# Patient Record
Sex: FEMALE | Race: BLACK OR AFRICAN AMERICAN | NOT HISPANIC OR LATINO | Employment: FULL TIME | URBAN - METROPOLITAN AREA
[De-identification: names, ages, dates, MRNs, and addresses within clinical notes are randomized per-mention and may not be internally consistent; named-entity substitution may affect disease eponyms.]

---

## 2017-03-27 ENCOUNTER — OFFICE VISIT (OUTPATIENT)
Dept: FAMILY MEDICINE | Facility: CLINIC | Age: 32
End: 2017-03-27
Payer: COMMERCIAL

## 2017-03-27 VITALS
SYSTOLIC BLOOD PRESSURE: 120 MMHG | HEART RATE: 83 BPM | DIASTOLIC BLOOD PRESSURE: 64 MMHG | OXYGEN SATURATION: 100 % | WEIGHT: 211.88 LBS | BODY MASS INDEX: 30.4 KG/M2 | RESPIRATION RATE: 16 BRPM

## 2017-03-27 DIAGNOSIS — F41.9 ANXIETY: ICD-10-CM

## 2017-03-27 DIAGNOSIS — E66.9 OBESITY (BMI 30-39.9): ICD-10-CM

## 2017-03-27 DIAGNOSIS — F51.01 PRIMARY INSOMNIA: Primary | ICD-10-CM

## 2017-03-27 DIAGNOSIS — Z83.3 FAMILY HISTORY OF DIABETES MELLITUS IN MOTHER: ICD-10-CM

## 2017-03-27 PROCEDURE — 1160F RVW MEDS BY RX/DR IN RCRD: CPT | Mod: S$GLB,,, | Performed by: FAMILY MEDICINE

## 2017-03-27 PROCEDURE — 99214 OFFICE O/P EST MOD 30 MIN: CPT | Mod: S$GLB,,, | Performed by: FAMILY MEDICINE

## 2017-03-27 PROCEDURE — 99999 PR PBB SHADOW E&M-EST. PATIENT-LVL III: CPT | Mod: PBBFAC,,, | Performed by: FAMILY MEDICINE

## 2017-03-27 RX ORDER — ESCITALOPRAM OXALATE 10 MG/1
10 TABLET ORAL DAILY
Qty: 30 TABLET | Refills: 0 | Status: SHIPPED | OUTPATIENT
Start: 2017-03-27 | End: 2017-04-26 | Stop reason: SDUPTHER

## 2017-03-27 NOTE — PROGRESS NOTES
Subjective:       Patient ID: Geri Mosqueda is a 31 y.o. female.    Chief Complaint: Trouble Sleeping and Fatigue    Fatigue   Associated symptoms include fatigue. Pertinent negatives include no abdominal pain, chest pain, chills, fever, headaches, nausea, vomiting or weakness.    31 year old female here for trouble sleeping. Patient has been taking trazodone but does not like it because it makes her too groggy in the am.   Patient's issue is staying asleep. Patient has had these symptoms since childhood. Patient averages 6 hours a night. She does not take naps. Patient consumes alcohol only on special occasions. Patient states she has anxiety and symptoms of minor depression. No suicidal thoughts. 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 She thinks her anxiety may be contributing to her sleep problems. SHe has spoken to a licensed social worker in the past and plans on doing so again, which helped with therapy for anxiety. She has tried melatonin in the past with minimal relief.    Patient has copper IUD due to history of provoked post op DVT/PE. She was put on coumadin for 6-9 months and has not had complication since then.  Patient had paragard placed via  at [facility] in 2015. Patient has an appointment for pap smear in 1 week.     Review of Systems   Constitutional: Positive for fatigue. Negative for chills and fever.   Respiratory: Negative for chest tightness and shortness of breath.    Cardiovascular: Negative for chest pain and leg swelling.   Gastrointestinal: Negative for abdominal pain, diarrhea, nausea and vomiting.   Genitourinary: Negative for menstrual problem.   Neurological: Negative for weakness and headaches.   Psychiatric/Behavioral: Positive for sleep disturbance. Negative for self-injury and suicidal ideas. The patient is nervous/anxious.        Objective:      Vitals:    03/27/17 0937   BP: 120/64   Pulse: 83   Resp: 16     Physical Exam    Constitutional: She is oriented to person, place, and time. She appears well-developed and well-nourished. No distress.   HENT:   Mouth/Throat: Oropharynx is clear and moist. No oropharyngeal exudate.   Eyes: EOM are normal. Right eye exhibits no discharge. Left eye exhibits no discharge.   Neck: Normal range of motion.   Cardiovascular: Normal rate and regular rhythm.    Pulmonary/Chest: Effort normal. She has no wheezes.   Abdominal: Soft. There is no tenderness. There is no rebound and no guarding.   Musculoskeletal: She exhibits no edema or tenderness.   Lymphadenopathy:     She has no cervical adenopathy.   Neurological: She is alert and oriented to person, place, and time.   Skin: Rash noted.   Rash on neck, chest, arms. Areas of hypopigmentation, flat.    Psychiatric: She has a normal mood and affect. Her behavior is normal. Judgment and thought content normal.   Vitals reviewed.      Assessment:       1. Primary insomnia    2. Obesity (BMI 30-39.9)    3. Family history of diabetes mellitus in mother        Plan:         1. Insomnia: will get labs. Advised on sleep hygiene and information given on that. Will start lexapro to control anxiety along with appointments for behavioral therapy. If there is not any improvement, will refer to sleep specialist. I have advised on increasing exercise to average 5 times weekly and adequate hydration.   RTC 3-4 weeks for f/u.     Primary insomnia  -     CBC auto differential; Future; Expected date: 3/27/17  -     TSH; Future; Expected date: 3/27/17    Obesity (BMI 30-39.9)  -     Basic metabolic panel; Future; Expected date: 3/27/17  -     Lipid panel; Future; Expected date: 3/27/17  -     TSH; Future; Expected date: 3/27/17  -     Hemoglobin A1c; Future; Expected date: 3/27/17    Family history of diabetes mellitus in mother  -     Hemoglobin A1c; Future; Expected date: 3/27/17    Other orders  -     escitalopram oxalate (LEXAPRO) 10 MG tablet; Take 1 tablet (10 mg  total) by mouth once daily.  Dispense: 30 tablet; Refill: 0      Return in about 4 weeks (around 4/24/2017).

## 2017-03-27 NOTE — MR AVS SNAPSHOT
[facility]  [address] [place] [address]  Phone: [phone]  Fax: [phone]                  Geri Mosqueda   3/27/2017 9:20 AM   Office Visit    Description:  Female : 1985   Provider:  Wendy Reina MD   Department:  [facility] Medicine           Reason for Visit     Trouble Sleeping     Fatigue           Diagnoses this Visit        Comments    Primary insomnia    -  Primary     Obesity (BMI 30-39.9)         Family history of diabetes mellitus in mother                To Do List           Goals (5 Years of Data)     None      Follow-Up and Disposition     Return in about 4 weeks (around 2017).       These Medications        Disp Refills Start End    escitalopram oxalate (LEXAPRO) 10 MG tablet 30 tablet 0 3/27/2017 3/27/2018    Take 1 tablet (10 mg total) by mouth once daily. - Oral    Pharmacy: [facility] [address] [place] [address] Ph #: [phone]         [facility] On Call     [facility] On Call Nurse Care Line -  Assistance  Registered nurses in the [facility] Center provide clinical advisement, health education, appointment booking, and other advisory services.  Call for this free service at [phone].             Medications           Message regarding Medications     Verify the changes and/or additions to your medication regime listed below are the same as discussed with your clinician today.  If any of these changes or additions are incorrect, please notify your healthcare provider.        START taking these NEW medications        Refills    escitalopram oxalate (LEXAPRO) 10 MG tablet 0    Sig: Take 1 tablet (10 mg total) by mouth once daily.    Class: Normal    Route: Oral      STOP taking these medications     levonorgestrel (MIRENA) 20 mcg/24 hr (5 years) IUD 1 each by Intrauterine route once.           Verify that the below list of medications is an accurate representation of the medications you  are currently taking.  If none reported, the list may be blank. If incorrect, please contact your healthcare provider. Carry this list with you in case of emergency.           Current Medications     copper (PARAGARD T 380A) 380 square mm IUD 1 each by Intrauterine route.    escitalopram oxalate (LEXAPRO) 10 MG tablet Take 1 tablet (10 mg total) by mouth once daily.           Clinical Reference Information           Your Vitals Were     BP Pulse Resp Weight Last Period SpO2    120/64 (BP Location: Right arm, Patient Position: Sitting, BP Method: Manual) 83 16 96.1 kg (211 lb 13.8 oz) 02/24/2017 (Exact Date) 100%    BMI                30.4 kg/m2          Blood Pressure          Most Recent Value    BP  120/64      Allergies as of 3/27/2017     Percocet [Oxycodone-acetaminophen]      Immunizations Administered on Date of Encounter - 3/27/2017     None      Orders Placed During Today's Visit     Future Labs/Procedures Expected by Expires    Basic metabolic panel  3/27/2017 5/26/2018    CBC auto differential  3/27/2017 5/26/2018    Hemoglobin A1c  3/27/2017 5/26/2018    Lipid panel  3/27/2017 5/26/2018    TSH  3/27/2017 5/26/2018      Instructions      Insomnia  Insomnia is repeated difficulty going to sleep or staying asleep, or both. Whether you have insomnia is not defined by a specific amount of sleep. Different people need different amounts of sleep, and you may need more or less sleep at different times of your life.  There are 3 major types of insomnia:  short-term, chronic, and other.  Short-term, or acute insomnia lasts less than 3 months.  The symptoms are temporary and can be linked directly to a stressor, such as the death of a loved one, financial problems, or a new physical problem.  Short-term insomnia stops when the stressor resolves or the person adapts to its presence.  Chronic insomnia occurs at least 3 times a week and lasts longer than 3 months.  Chronic insomnia can occur when either the cause of  the sleeping problem is not clear, or the insomnia does not get better when the stressor is resolved. A number of other criteria are also used to make the diagnosis of chronic insomnia.   Other insomnia is the third type of insomnia-related sleep disorders.  This description applies to people who have problems getting to sleep or staying asleep, but do not meet all of the factors that describe either short-term or chronic insomnia.    Many things cause insomnia. Different people may have different causes. It can be from an underlying medical or psychological condition, or lifestyle. It can also be primary insomnia, which means no cause can be found.  Causes of insomnia include:  · Chronic medical problems- heart disease, gastrointestinal problems, hormonal changes, breathing problems  · Anxiety  · Stress  · Depression  · Pain  · Work schedule  · Sleep apnea  · Illegal drugs  · Certain medicines  Many different medidcines can affect your sleep, such as stimulants, caffeine, alcohol, some decongestants, and diet pills. Other medicines may include some types of blood pressure pills, steroids, asthma medicines, antihistamines, antidepressants, seizure medicines and statins. Not all of these will affect your sleep, and they shouldnt be stopped without talking to your doctor.  Symptoms of insomnia can include:  · Lying awake for long periods at night before falling asleep  · Waking up several times during the night  · Waking up early in the morning and not being able to get back to sleep  · Feeling tired and not refreshed by sleep  · Not being able to function properly during the day and finding it hard to concentrate  · Irritability  · Tiredness and fatigue during the day  Home care  1. Review your medicines with your doctor or pharmacist to find out if they can cause insomnia. Not all medicines will affect your sleep, but they shouldn't be stopped without reviewing them with your doctor. There may be serious side  effects and consequences from suddenly stopping your medicines. Not taking them may cause strokes, heart attacks, and many other problems.  2. Caffeine, smoking and alcohol also affect sleep. Limit your daily use and do not use these before bedtime. Alcohol may make you sleepy at first, but as its effects wear off, you may awaken a few hours later and have trouble returning to sleep.  3. Do not exercise, eat or drink large amounts of liquid within 2 hours of your bedtime.  4. Improve your sleep habits. Have a fixed bed and wake-up time. Try to keep noise, light and heat in your bedroom at a comfortable level. Try using earplugs or eyeshades if needed.   5. Avoid watching TV in bed.  6. If you do not fall asleep within 30 minutes, try to relax by reading or listening to soft music.  7. Limit daytime napping to one 30 minute period, early in the day.  8. Get regular exercise. Find other ways to lessen your stress level.  9. If a medicine was prescribed to help reset your sleep patterns, take it as directed. Sleeping pills are intended for short-term use, only. If taken for too long, the effect wears off while the risk of physical addiction and psychological dependence increases.  Sleep diary  If the cause isnt obvious and it is not improving, try keeping a sleep diary for a couple of weeks. Include in it:  · The time you go to bed  · How long it takes to fall asleep  · How many times you wake up  · What time you wake up  · Your meal times and what you eat  · What time you drink alcohol  · Your exercise habits and times  Follow-up care  Follow up with your healthcare provider, or as advised. If X-rays or CT scans were done, you will be notified if there is a change in the reading, especially if it affects treatment.  Call 911  Call 911 if any of these occur:  · Trouble breathing  · Confusion or trouble waking  · Fainting or loss of consciousness  · Rapid heart rate  · New chest, arm, shoulder, neck or upper back  pain  · Trouble with speech or vision, weakness of an arm or leg  · Trouble walking or talking, loss of balance, numbness or weakness in one side of your body, facial droop  When to seek medical advice  Call your healthcare provider right away if any of these occur:  · Extreme restlessness or irritability  · Confusion or hallucinations (seeing or hearing things that are not there)  · Anxiety, depression  · Several days without sleeping  Date Last Reviewed: 11/19/2015  © 0863-4844 [employer]. [address] [place] [address]. All rights reserved. This information is not intended as a substitute for professional medical care. Always follow your healthcare professional's instructions.             Language Assistance Services     ATTENTION: Language assistance services are available, free of charge. Please call [phone].      ATENCIÓN: Si son keating, tiene a sharma disposición servicios gratuitos de asistencia lingüística. Llame al [phone].     CHÚ Ý: N?u b?n nói Ti?ng Vi?t, có các d?ch v? h? tr? ngôn ng? mi?n phí dành cho b?n. G?i s? [phone].         [facility] complies with applicable Federal civil rights laws and does not discriminate on the basis of race, color, national origin, age, disability, or sex.

## 2017-03-27 NOTE — PATIENT INSTRUCTIONS
Insomnia  Insomnia is repeated difficulty going to sleep or staying asleep, or both. Whether you have insomnia is not defined by a specific amount of sleep. Different people need different amounts of sleep, and you may need more or less sleep at different times of your life.  There are 3 major types of insomnia:  short-term, chronic, and other.  Short-term, or acute insomnia lasts less than 3 months.  The symptoms are temporary and can be linked directly to a stressor, such as the death of a loved one, financial problems, or a new physical problem.  Short-term insomnia stops when the stressor resolves or the person adapts to its presence.  Chronic insomnia occurs at least 3 times a week and lasts longer than 3 months.  Chronic insomnia can occur when either the cause of the sleeping problem is not clear, or the insomnia does not get better when the stressor is resolved. A number of other criteria are also used to make the diagnosis of chronic insomnia.   Other insomnia is the third type of insomnia-related sleep disorders.  This description applies to people who have problems getting to sleep or staying asleep, but do not meet all of the factors that describe either short-term or chronic insomnia.    Many things cause insomnia. Different people may have different causes. It can be from an underlying medical or psychological condition, or lifestyle. It can also be primary insomnia, which means no cause can be found.  Causes of insomnia include:  · Chronic medical problems- heart disease, gastrointestinal problems, hormonal changes, breathing problems  · Anxiety  · Stress  · Depression  · Pain  · Work schedule  · Sleep apnea  · Illegal drugs  · Certain medicines  Many different medidcines can affect your sleep, such as stimulants, caffeine, alcohol, some decongestants, and diet pills. Other medicines may include some types of blood pressure pills, steroids, asthma medicines, antihistamines, antidepressants,  seizure medicines and statins. Not all of these will affect your sleep, and they shouldnt be stopped without talking to your doctor.  Symptoms of insomnia can include:  · Lying awake for long periods at night before falling asleep  · Waking up several times during the night  · Waking up early in the morning and not being able to get back to sleep  · Feeling tired and not refreshed by sleep  · Not being able to function properly during the day and finding it hard to concentrate  · Irritability  · Tiredness and fatigue during the day  Home care  1. Review your medicines with your doctor or pharmacist to find out if they can cause insomnia. Not all medicines will affect your sleep, but they shouldn't be stopped without reviewing them with your doctor. There may be serious side effects and consequences from suddenly stopping your medicines. Not taking them may cause strokes, heart attacks, and many other problems.  2. Caffeine, smoking and alcohol also affect sleep. Limit your daily use and do not use these before bedtime. Alcohol may make you sleepy at first, but as its effects wear off, you may awaken a few hours later and have trouble returning to sleep.  3. Do not exercise, eat or drink large amounts of liquid within 2 hours of your bedtime.  4. Improve your sleep habits. Have a fixed bed and wake-up time. Try to keep noise, light and heat in your bedroom at a comfortable level. Try using earplugs or eyeshades if needed.   5. Avoid watching TV in bed.  6. If you do not fall asleep within 30 minutes, try to relax by reading or listening to soft music.  7. Limit daytime napping to one 30 minute period, early in the day.  8. Get regular exercise. Find other ways to lessen your stress level.  9. If a medicine was prescribed to help reset your sleep patterns, take it as directed. Sleeping pills are intended for short-term use, only. If taken for too long, the effect wears off while the risk of physical addiction and  psychological dependence increases.  Sleep diary  If the cause isnt obvious and it is not improving, try keeping a sleep diary for a couple of weeks. Include in it:  · The time you go to bed  · How long it takes to fall asleep  · How many times you wake up  · What time you wake up  · Your meal times and what you eat  · What time you drink alcohol  · Your exercise habits and times  Follow-up care  Follow up with your healthcare provider, or as advised. If X-rays or CT scans were done, you will be notified if there is a change in the reading, especially if it affects treatment.  Call 911  Call 911 if any of these occur:  · Trouble breathing  · Confusion or trouble waking  · Fainting or loss of consciousness  · Rapid heart rate  · New chest, arm, shoulder, neck or upper back pain  · Trouble with speech or vision, weakness of an arm or leg  · Trouble walking or talking, loss of balance, numbness or weakness in one side of your body, facial droop  When to seek medical advice  Call your healthcare provider right away if any of these occur:  · Extreme restlessness or irritability  · Confusion or hallucinations (seeing or hearing things that are not there)  · Anxiety, depression  · Several days without sleeping  Date Last Reviewed: 11/19/2015  © 7626-3464 [employer]. [address] [place] [address], [place] [address]. All rights reserved. This information is not intended as a substitute for professional medical care. Always follow your healthcare professional's instructions.

## 2017-04-26 RX ORDER — ESCITALOPRAM OXALATE 10 MG/1
10 TABLET ORAL DAILY
Qty: 30 TABLET | Refills: 11 | Status: SHIPPED | OUTPATIENT
Start: 2017-04-26 | End: 2018-03-01

## 2017-10-10 ENCOUNTER — HOSPITAL ENCOUNTER (OUTPATIENT)
Dept: RADIOLOGY | Facility: HOSPITAL | Age: 32
Discharge: HOME | End: 2017-10-10
Attending: ORTHOPAEDIC SURGERY
Payer: COMMERCIAL

## 2017-10-10 ENCOUNTER — OFFICE VISIT (OUTPATIENT)
Dept: SPORTS MEDICINE | Facility: CLINIC | Age: 32
End: 2017-10-10
Payer: COMMERCIAL

## 2017-10-10 VITALS
DIASTOLIC BLOOD PRESSURE: 83 MMHG | WEIGHT: 240 LBS | HEART RATE: 95 BPM | BODY MASS INDEX: 33.6 KG/M2 | HEIGHT: 71 IN | SYSTOLIC BLOOD PRESSURE: 132 MMHG

## 2017-10-10 DIAGNOSIS — M25.561 PAIN IN BOTH KNEES, UNSPECIFIED CHRONICITY: Primary | ICD-10-CM

## 2017-10-10 DIAGNOSIS — M25.561 PAIN IN BOTH KNEES, UNSPECIFIED CHRONICITY: ICD-10-CM

## 2017-10-10 DIAGNOSIS — M25.562 PAIN IN BOTH KNEES, UNSPECIFIED CHRONICITY: ICD-10-CM

## 2017-10-10 DIAGNOSIS — M25.562 ACUTE PAIN OF LEFT KNEE: ICD-10-CM

## 2017-10-10 DIAGNOSIS — M25.562 PAIN IN BOTH KNEES, UNSPECIFIED CHRONICITY: Primary | ICD-10-CM

## 2017-10-10 PROCEDURE — 73564 X-RAY EXAM KNEE 4 OR MORE: CPT | Mod: TC,50,PO

## 2017-10-10 PROCEDURE — 73564 X-RAY EXAM KNEE 4 OR MORE: CPT | Mod: 26,50,, | Performed by: RADIOLOGY

## 2017-10-10 PROCEDURE — 99999 PR PBB SHADOW E&M-EST. PATIENT-LVL III: CPT | Mod: PBBFAC,,, | Performed by: ORTHOPAEDIC SURGERY

## 2017-10-10 PROCEDURE — 99203 OFFICE O/P NEW LOW 30 MIN: CPT | Mod: S$GLB,,, | Performed by: ORTHOPAEDIC SURGERY

## 2017-10-10 RX ORDER — CLINDAMYCIN HYDROCHLORIDE 300 MG/1
300 CAPSULE ORAL EVERY 6 HOURS
COMMUNITY
End: 2018-03-01

## 2017-10-10 NOTE — PROGRESS NOTES
CC: Left knee pain    32 y.o. Female with a history of left knee pain who fell directly on to a curb while vacationing in [place] in June 2017.  Reports painful clicking.  Pain with squating, stairs, and kneeling.  Has tried ibuprofen as needed.  She states that the pain is severe and not responding to any conservative care.      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      She reports that the pain and weakness. It also bothers her at night.    + mechanical symptoms, - instability    Is affecting ADLs.  Pain is 4/10 at it's worst.    REVIEW OF SYSTEMS:  Constitution: Negative. Negative for chills, fever and night sweats.   HENT: Negative for congestion and headaches.    Eyes: Negative for blurred vision, left vision loss and right vision loss.   Cardiovascular: Negative for chest pain and syncope.   Respiratory: Negative for cough and shortness of breath.    Endocrine: Negative for polydipsia, polyphagia and polyuria.   Hematologic/Lymphatic: Negative for bleeding problem. Does not bruise/bleed easily.   Skin: Negative for dry skin, itching and rash.   Musculoskeletal: Negative for falls. Positive for left knee pain and  muscle weakness.   Gastrointestinal: Negative for abdominal pain and bowel incontinence.   Genitourinary: Negative for bladder incontinence and nocturia.   Neurological: Negative for disturbances in coordination, loss of balance and seizures.   Psychiatric/Behavioral: Negative for depression. The patient does not have insomnia.    Allergic/Immunologic: Negative for hives and persistent infections.     PAST MEDICAL HISTORY:    Past Medical History:   Diagnosis Date    DVT (deep venous thrombosis) 2/11/2014    after surgery    Follicular adenoma of thyroid gland 2/6/2014    PE (pulmonary embolism) 2/11/2014    after surgery       PAST SURGICAL HISTORY:   Past Surgical History:   Procedure Laterality Date    THYROID LOBECTOMY Left 2/6/2014    and isthmusectomy    TONSILLECTOMY         FAMILY  "HISTORY:   Family History   Problem Relation Age of Onset    Depression Mother     Diabetes Mother     Hypertension Mother     Hypertension Daughter     Heart attack Maternal Grandmother     Hypertension Maternal Grandmother     Cancer Brother     Heart disease Neg Hx     Anemia Neg Hx     Arrhythmia Neg Hx     Asthma Neg Hx     Clotting disorder Neg Hx     Fainting Neg Hx     Heart failure Neg Hx     Hyperlipidemia Neg Hx        SOCIAL HISTORY:   Social History     Social History    Marital status: Single     Spouse name: N/A    Number of children: N/A    Years of education: N/A     Occupational History     [employer]     Social History Main Topics    Smoking status: Never Smoker    Smokeless tobacco: Never Used    Alcohol use No    Drug use: No    Sexual activity: Not on file     Other Topics Concern    Not on file     Social History Narrative    Living with a roomate. She is from [place]. Her mother and step-father live in [facility]. She is a  and teaches voice in elementary. She attended school at [employer] and graduated from [employer]. 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        MEDICATIONS:     Current Outpatient Prescriptions:     clindamycin (CLEOCIN) 300 MG capsule, Take 300 mg by mouth every 6 (six) hours., Disp: , Rfl:     copper (PARAGARD T 380A) 380 square mm IUD, 1 each by Intrauterine route., Disp: , Rfl:     escitalopram oxalate (LEXAPRO) 10 MG tablet, Take 1 tablet (10 mg total) by mouth once daily., Disp: 30 tablet, Rfl: 11    ALLERGIES:   Review of patient's allergies indicates:   Allergen Reactions    Percocet [oxycodone-acetaminophen]        VITAL SIGNS:   /83   Pulse 95   Ht 5' 11" (1.803 m)   Wt 108.9 kg (240 lb)   LMP 09/17/2017   BMI 33.47 kg/m²      PHYSICAL EXAMINATION  General:  The patient is alert and oriented x 3.  Mood is pleasant.  Observation of ears, eyes and nose reveal no gross abnormalities.  No labored " breathing observed.    LEFT KNEE EXAMINATION     OBSERVATION / INSPECTION   Gait:   Antalgic   Alignment:  Neutral   Scars:   None   Muscle atrophy: Mild  Effusion:  Mild  Warmth:  None   Discoloration:   none     TENDERNESS / CREPITUS (T / C):          T / C      T / C   Patella   - / -   Lateral joint line   - / -   Peripatellar medial  -  Medial joint line    + / -   Peripatellar lateral -  Medial plica   - / -   Patellar tendon -   Popliteal fossa   - / -   Quad tendon   -   Gastrocnemius   -   Prepatellar Bursa - / -   Quadricep   -   Tibial tubercle  -  Thigh/hamstring  -   Pes anserine/HS -  Fibula    -   ITB   - / -  Tibia     -   Tib/fib joint  - / -  LCL    -     MFC   - / -   MCL: Proximal  -    LFC   - / -    Distal   -          ROM: (* = pain)  PASSIVE   ACTIVE    Left :   5 / 0 / 135 *   5 / 0 / 130 *     Right :    5 / 0 / 145   5 / 0 / 145    Patellofemoral examination:  See above noted areas of tenderness.   Patella position    Subluxation / dislocation: Centered           Sup. / Inf;   Normal   Crepitus (PF):    Absent   Patellar Mobility:       Medial-lateral:   Normal    Superior-inferior:  Normal    Inferior tilt   Normal    Patellar tendon:  Normal   Lateral tilt:    Normal   J-sign:     None   Patellofemoral grind:   No pain       MENISCAL SIGNS:     Pain on terminal extension:  -  Pain on terminal flexion:  +  Reannas maneuver:  + (for pain)  Squat     + (for pain)    LIGAMENT EXAMINATION:  ACL / Lachman:  normal (-1 to 2mm)    PCL-Post.  drawer: normal 0 to 2mm  MCL- Valgus:  normal 0 to 2mm  LCL- Varus:  normal 0 to 2mm  Pivot shift: normal (Equal)   Dial Test: difference c/w other side   At 30° flexion: normal (< 5°)    At 90° flexion: normal (< 5°)   Reverse Pivot Shift:   normal (Equal)     STRENGTH: (* = with pain) PAINFUL SIDE   Quadricep   4+/5   Hamstrin+/5    EXTREMITY NEURO-VASCULAR EXAMINATION:   Sensation:  Grossly intact to light touch all dermatomal regions.    Motor Function:  Fully intact motor function at hip, knee, foot and ankle    DTRs;  quadriceps and  achilles 2+.  No clonus and downgoing Babinski.    Vascular status:  DP and PT pulses 2+, brisk capillary refill, symmetric.     XRAY (10/10/17): No acute abnormality.     ASSESSMENT:    Left knee pain, possible chondral injury vs medial mensicus tear     PLAN:   1. MRI Left knee to evaluate for chondral injury vs medial meniscus tear  2. Follow up in clinic for MRI results    All questions were answered, pt will contact us for questions or concerns in the interim.

## 2017-10-20 ENCOUNTER — HOSPITAL ENCOUNTER (OUTPATIENT)
Dept: RADIOLOGY | Facility: OTHER | Age: 32
Discharge: HOME | End: 2017-10-20
Attending: ORTHOPAEDIC SURGERY
Payer: COMMERCIAL

## 2017-10-20 DIAGNOSIS — M25.562 ACUTE PAIN OF LEFT KNEE: ICD-10-CM

## 2017-10-20 PROCEDURE — 73721 MRI JNT OF LWR EXTRE W/O DYE: CPT | Mod: TC,LT

## 2017-10-20 PROCEDURE — 73721 MRI JNT OF LWR EXTRE W/O DYE: CPT | Mod: 26,LT,, | Performed by: RADIOLOGY

## 2017-10-24 ENCOUNTER — OFFICE VISIT (OUTPATIENT)
Dept: SPORTS MEDICINE | Facility: CLINIC | Age: 32
End: 2017-10-24
Payer: COMMERCIAL

## 2017-10-24 VITALS
WEIGHT: 240 LBS | SYSTOLIC BLOOD PRESSURE: 111 MMHG | HEIGHT: 71 IN | BODY MASS INDEX: 33.6 KG/M2 | HEART RATE: 86 BPM | DIASTOLIC BLOOD PRESSURE: 73 MMHG

## 2017-10-24 DIAGNOSIS — M25.562 LEFT KNEE PAIN, UNSPECIFIED CHRONICITY: Primary | ICD-10-CM

## 2017-10-24 PROCEDURE — 99999 PR PBB SHADOW E&M-EST. PATIENT-LVL III: CPT | Mod: PBBFAC,,, | Performed by: PHYSICIAN ASSISTANT

## 2017-10-24 PROCEDURE — 99214 OFFICE O/P EST MOD 30 MIN: CPT | Mod: S$GLB,,, | Performed by: PHYSICIAN ASSISTANT

## 2017-10-24 NOTE — PROGRESS NOTES
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 Reports painful clicking. Pain with squating, stairs, and kneeling. Pain is worse with flexion and better with extension. Pain with weightbearing. Constant dull ache. Has tried ibuprofen as needed.     She reports that the pain and weakness. It also bothers her at night.    + mechanical symptoms, - instability    Is affecting ADLs.  Pain is 6/10 at it's worst.    REVIEW OF SYSTEMS:  Constitution: Negative. Negative for chills, fever and night sweats.   HENT: Negative for congestion and headaches.    Eyes: Negative for blurred vision, left vision loss and right vision loss.   Cardiovascular: Negative for chest pain and syncope.   Respiratory: Negative for cough and shortness of breath.    Endocrine: Negative for polydipsia, polyphagia and polyuria.   Hematologic/Lymphatic: Negative for bleeding problem. Does not bruise/bleed easily.   Skin: Negative for dry skin, itching and rash.   Musculoskeletal: Negative for falls. Positive for left knee pain and  muscle weakness.   Gastrointestinal: Negative for abdominal pain and bowel incontinence.   Genitourinary: Negative for bladder incontinence and nocturia.   Neurological: Negative for disturbances in coordination, loss of balance and seizures.   Psychiatric/Behavioral: Negative for depression. The patient does not have insomnia.    Allergic/Immunologic: Negative for hives and persistent infections.     PAST MEDICAL HISTORY:    Past Medical History:   Diagnosis Date    DVT (deep venous thrombosis) 2/11/2014    after surgery    Follicular adenoma of thyroid gland 2/6/2014    PE (pulmonary embolism) 2/11/2014    after surgery       PAST SURGICAL HISTORY:   Past Surgical History:   Procedure Laterality Date    THYROID LOBECTOMY Left 2/6/2014    and isthmusectomy    TONSILLECTOMY         FAMILY  "HISTORY:   Family History   Problem Relation Age of Onset    Depression Mother     Diabetes Mother     Hypertension Mother     Hypertension Daughter     Heart attack Maternal Grandmother     Hypertension Maternal Grandmother     Cancer Brother     Heart disease Neg Hx     Anemia Neg Hx     Arrhythmia Neg Hx     Asthma Neg Hx     Clotting disorder Neg Hx     Fainting Neg Hx     Heart failure Neg Hx     Hyperlipidemia Neg Hx        SOCIAL HISTORY:   Social History     Social History    Marital status: Single     Spouse name: N/A    Number of children: N/A    Years of education: N/A     Occupational History     [employer]     Social History Main Topics    Smoking status: Never Smoker    Smokeless tobacco: Never Used    Alcohol use No    Drug use: No    Sexual activity: Not on file     Other Topics Concern    Not on file     Social History Narrative    Living with a roomate. She is from [place]. Her mother and step-father live in [facility] [place]. She is a  and teaches voice in elementary. She attended school at [employer] and graduated from [employer]. 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        MEDICATIONS:     Current Outpatient Prescriptions:     clindamycin (CLEOCIN) 300 MG capsule, Take 300 mg by mouth every 6 (six) hours., Disp: , Rfl:     copper (PARAGARD T 380A) 380 square mm IUD, 1 each by Intrauterine route., Disp: , Rfl:     escitalopram oxalate (LEXAPRO) 10 MG tablet, Take 1 tablet (10 mg total) by mouth once daily., Disp: 30 tablet, Rfl: 11    ALLERGIES:   Review of patient's allergies indicates:   Allergen Reactions    Percocet [oxycodone-acetaminophen]        VITAL SIGNS:   /73   Pulse 86   Ht 5' 11" (1.803 m)   Wt 108.9 kg (240 lb)   LMP 10/17/2017   BMI 33.47 kg/m²      PHYSICAL EXAMINATION  General:  The patient is alert and oriented x 3.  Mood is pleasant.  Observation of ears, eyes and nose reveal no gross abnormalities.  No labored " breathing observed.    LEFT KNEE EXAMINATION     OBSERVATION / INSPECTION   Gait:   Antalgic   Alignment:  Neutral   Scars:   None   Muscle atrophy: Mild  Effusion:  Mild  Warmth:  None   Discoloration:   none     TENDERNESS / CREPITUS (T / C):          T / C      T / C   Patella   - / -   Lateral joint line   - / -   Peripatellar medial  -  Medial joint line    + / -   Peripatellar lateral -  Medial plica   - / -   Patellar tendon -   Popliteal fossa   - / -   Quad tendon   -   Gastrocnemius   -   Prepatellar Bursa - / -   Quadricep   -   Tibial tubercle  -  Thigh/hamstring  -   Pes anserine/HS -  Fibula    -   ITB   - / -  Tibia     -   Tib/fib joint  - / -  LCL    -     MFC   - / -   MCL: Proximal  -    LFC   - / -    Distal   -          ROM: (* = pain)  PASSIVE   ACTIVE    Left :   5 / 0 / 135 *   5 / 0 / 130 *     Right :    5 / 0 / 145   5 / 0 / 145    Patellofemoral examination:  See above noted areas of tenderness.   Patella position    Subluxation / dislocation: Centered           Sup. / Inf;   Normal   Crepitus (PF):    Absent   Patellar Mobility:       Medial-lateral:   Normal    Superior-inferior:  Normal    Inferior tilt   Normal    Patellar tendon:  Normal   Lateral tilt:    Normal   J-sign:     None   Patellofemoral grind:   No pain       MENISCAL SIGNS:     Pain on terminal extension:  -  Pain on terminal flexion:  +  Reannas maneuver:  + (for pain)  Squat     + (for pain)    LIGAMENT EXAMINATION:  ACL / Lachman:  normal (-1 to 2mm)    PCL-Post.  drawer: normal 0 to 2mm  MCL- Valgus:  normal 0 to 2mm  LCL- Varus:  normal 0 to 2mm    STRENGTH: (* = with pain) PAINFUL SIDE   Quadricep   4/5   Hamstrin/5    EXTREMITY NEURO-VASCULAR EXAMINATION:   Sensation:  Grossly intact to light touch all dermatomal regions.   Motor Function:  Fully intact motor function at hip, knee, foot and ankle    DTRs;  quadriceps and  achilles 2+.  No clonus and downgoing Babinski.    Vascular status:  DP and PT  pulses 2+, brisk capillary refill, symmetric.     IMAGING:    XRAY (10/10/17):    TECHNIQUE: 4 view radiographs of the right knee and 4 view radiographs of the left knee, including standing.    COMPARISON: N/A      FINDINGS:     Fracture: None.    Joint Space: No effusion.     Soft Tissues: Normal.     MRI Left Knee:    Findings: Routine knee MRI protocol performed without IV contrast. The cruciate ligaments are intact. The lateral collateral and medial collateral ligament complexes are intact. The extensor mechanism is unremarkable. No joint effusion . The lateral meniscus is unremarkable. There is increased linear signal in the posterior horn of the medial meniscus, but does not extend to the articular surface. Generalized cartilage thinning. Small focus of cartilage fissuring in the median eminence of the patella. No subchondral marrow changes. No high-grade chondromalacia identified. The muscle signal is within normal limits. The marrow signal is unremarkable.    Impression:    No discrete meniscal tear. Increased signal noted in the medial meniscus posterior horn, but does not extend to the articular surface.      Small focus of cartilage fissuring in the patella. No high-grade chondromalacia identified.     ASSESSMENT:    Left knee pain    Treatment options were discussed with the patient about her left knee. I made the decision to obtain old records of the patient including previous notes and imaging. I independently reviewed and interpreted her radiographs and/or MRIs today as well as prior imaging. Results of patient's left knee MRI were discussed in detail with the patient and she had the opportunity to ask all questions. I reviewed the MRI images with her, including the relevant anatomy, and what this means for her knee. We discussed both non-operative and operative options for her knee and the risks and benefits of each. Time was spent face-to-face with the patient during this encounter on counseling  about treatment options including physical therapy, home exercise program, RICE, NSAIDs, CSI. I also coordinated patient's follow up care. The total face-to-face encounter time with this patient was 30 minutes and greater than 50% of of the encounter time was spent counseling the patient and coordinating care. Significant time was also spent educating the patient, giving detail post-visit instructions, and discussing risks and benefits of treatment. Patient also had several specific questions that were answered during the visit today.    PLAN:   1. Physical therapy  2. OTC NSAIDs PRN  3. Ice/elevate left knee  4. RTC 2 months for follow up    All questions were answered, pt will contact us for questions or concerns in the interim.

## 2017-11-01 PROBLEM — G89.29 CHRONIC PAIN OF LEFT KNEE: Status: ACTIVE | Noted: 2017-11-01

## 2017-11-01 PROBLEM — M25.562 CHRONIC PAIN OF LEFT KNEE: Status: ACTIVE | Noted: 2017-11-01

## 2017-12-07 ENCOUNTER — TELEPHONE (OUTPATIENT)
Dept: SPORTS MEDICINE | Facility: CLINIC | Age: 32
End: 2017-12-07

## 2017-12-07 NOTE — TELEPHONE ENCOUNTER
Called patient, no answer, left message to R/S appt on 12/26/2017. Elizabeth House PA-C will be out of the office.

## 2017-12-22 PROBLEM — Z74.09 IMPAIRED MOBILITY: Status: ACTIVE | Noted: 2017-12-22

## 2017-12-22 PROBLEM — M25.562 ACUTE PAIN OF LEFT KNEE: Status: ACTIVE | Noted: 2017-12-22

## 2017-12-26 ENCOUNTER — OFFICE VISIT (OUTPATIENT)
Dept: SPORTS MEDICINE | Facility: CLINIC | Age: 32
End: 2017-12-26
Payer: COMMERCIAL

## 2017-12-26 VITALS
WEIGHT: 240 LBS | SYSTOLIC BLOOD PRESSURE: 120 MMHG | BODY MASS INDEX: 33.6 KG/M2 | HEART RATE: 74 BPM | DIASTOLIC BLOOD PRESSURE: 81 MMHG | HEIGHT: 71 IN

## 2017-12-26 DIAGNOSIS — M25.562 CHRONIC PAIN OF LEFT KNEE: Primary | ICD-10-CM

## 2017-12-26 DIAGNOSIS — M25.562 ACUTE PAIN OF LEFT KNEE: Primary | ICD-10-CM

## 2017-12-26 DIAGNOSIS — G89.29 CHRONIC PAIN OF LEFT KNEE: Primary | ICD-10-CM

## 2017-12-26 PROCEDURE — 99999 PR PBB SHADOW E&M-EST. PATIENT-LVL III: CPT | Mod: PBBFAC,,, | Performed by: PHYSICIAN ASSISTANT

## 2017-12-26 PROCEDURE — 99214 OFFICE O/P EST MOD 30 MIN: CPT | Mod: S$GLB,,, | Performed by: PHYSICIAN ASSISTANT

## 2017-12-26 RX ORDER — MELOXICAM 15 MG/1
15 TABLET ORAL DAILY
Qty: 30 TABLET | Refills: 2 | Status: SHIPPED | OUTPATIENT
Start: 2017-12-26 | End: 2018-03-01

## 2017-12-26 NOTE — PROGRESS NOTES
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 She reports continued pain and no relief with physical therapy. Reports painful clicking. Pain with squating, stairs, and kneeling. Pain is worse with flexion and better with extension. Pain with weightbearing. Constant dull ache. Has tried ibuprofen as needed.     She reports that the pain and weakness. It also bothers her at night.    + mechanical symptoms, - instability    Is affecting ADLs.  Pain is 6/10 at it's worst.    REVIEW OF SYSTEMS:  Constitution: Negative. Negative for chills, fever and night sweats.   HENT: Negative for congestion and headaches.    Eyes: Negative for blurred vision, left vision loss and right vision loss.   Cardiovascular: Negative for chest pain and syncope.   Respiratory: Negative for cough and shortness of breath.    Endocrine: Negative for polydipsia, polyphagia and polyuria.   Hematologic/Lymphatic: Negative for bleeding problem. Does not bruise/bleed easily.   Skin: Negative for dry skin, itching and rash.   Musculoskeletal: Negative for falls. Positive for left knee pain and  muscle weakness.   Gastrointestinal: Negative for abdominal pain and bowel incontinence.   Genitourinary: Negative for bladder incontinence and nocturia.   Neurological: Negative for disturbances in coordination, loss of balance and seizures.   Psychiatric/Behavioral: Negative for depression. The patient does not have insomnia.    Allergic/Immunologic: Negative for hives and persistent infections.     PAST MEDICAL HISTORY:    Past Medical History:   Diagnosis Date    DVT (deep venous thrombosis) 2/11/2014    after surgery    Follicular adenoma of thyroid gland 2/6/2014    PE (pulmonary embolism) 2/11/2014    after surgery       PAST SURGICAL HISTORY:   Past Surgical History:   Procedure Laterality Date    THYROID  "LOBECTOMY Left 2/6/2014    and isthmusectomy    TONSILLECTOMY         FAMILY HISTORY:   Family History   Problem Relation Age of Onset    Depression Mother     Diabetes Mother     Hypertension Mother     Hypertension Daughter     Heart attack Maternal Grandmother     Hypertension Maternal Grandmother     Cancer Brother     Heart disease Neg Hx     Anemia Neg Hx     Arrhythmia Neg Hx     Asthma Neg Hx     Clotting disorder Neg Hx     Fainting Neg Hx     Heart failure Neg Hx     Hyperlipidemia Neg Hx        SOCIAL HISTORY:   Social History     Social History    Marital status: Single     Spouse name: N/A    Number of children: N/A    Years of education: N/A     Occupational History     [employer]     Social History Main Topics    Smoking status: Never Smoker    Smokeless tobacco: Never Used    Alcohol use No    Drug use: No    Sexual activity: Not on file     Other Topics Concern    Not on file     Social History Narrative    Living with a roomate. She is from [place]. Her mother and step-father live in [facility]. She is a  and teaches voice in elementary. She attended school at [employer] and graduated from [employer]. 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        MEDICATIONS:     Current Outpatient Prescriptions:     copper (PARAGARD T 380A) 380 square mm IUD, 1 each by Intrauterine route., Disp: , Rfl:     escitalopram oxalate (LEXAPRO) 10 MG tablet, Take 1 tablet (10 mg total) by mouth once daily., Disp: 30 tablet, Rfl: 11    clindamycin (CLEOCIN) 300 MG capsule, Take 300 mg by mouth every 6 (six) hours., Disp: , Rfl:     ALLERGIES:   Review of patient's allergies indicates:   Allergen Reactions    Percocet [oxycodone-acetaminophen]        VITAL SIGNS:   /81   Pulse 74   Ht 5' 11" (1.803 m)   Wt 108.9 kg (240 lb)   BMI 33.47 kg/m²      PHYSICAL EXAMINATION  General:  The patient is alert and oriented x 3.  Mood is pleasant.  Observation of ears, " eyes and nose reveal no gross abnormalities.  No labored breathing observed.    LEFT KNEE EXAMINATION     OBSERVATION / INSPECTION   Gait:   Antalgic   Alignment:  Neutral   Scars:   None   Muscle atrophy: Mild  Effusion:  Mild  Warmth:  None   Discoloration:   none     TENDERNESS / CREPITUS (T / C):          T / C      T / C   Patella   - / -   Lateral joint line   - / -   Peripatellar medial  -  Medial joint line    + / -   Peripatellar lateral -  Medial plica   - / -   Patellar tendon -   Popliteal fossa   - / -   Quad tendon   -   Gastrocnemius   -   Prepatellar Bursa - / -   Quadricep   -   Tibial tubercle  -  Thigh/hamstring  -   Pes anserine/HS -  Fibula    -   ITB   - / -  Tibia     -   Tib/fib joint  - / -  LCL    -     MFC   - / -   MCL: Proximal  -    LFC   - / -    Distal   -          ROM: (* = pain)  PASSIVE   ACTIVE    Left :   5 / 0 / 135 *   5 / 0 / 130 *     Right :    5 / 0 / 145   5 / 0 / 145    Patellofemoral examination:  See above noted areas of tenderness.   Patella position    Subluxation / dislocation: Centered           Sup. / Inf;   Normal   Crepitus (PF):    Absent   Patellar Mobility:       Medial-lateral:   Normal    Superior-inferior:  Normal    Inferior tilt   Normal    Patellar tendon:  Normal   Lateral tilt:    Normal   J-sign:     None   Patellofemoral grind:   No pain       MENISCAL SIGNS:     Pain on terminal extension:  -  Pain on terminal flexion:  +  Reannas maneuver:  + (for pain)  Squat     + (for pain)    LIGAMENT EXAMINATION:  ACL / Lachman:  normal (-1 to 2mm)    PCL-Post.  drawer: normal 0 to 2mm  MCL- Valgus:  normal 0 to 2mm  LCL- Varus:  normal 0 to 2mm    STRENGTH: (* = with pain) PAINFUL SIDE   Quadricep   4/5   Hamstrin/5    EXTREMITY NEURO-VASCULAR EXAMINATION:   Sensation:  Grossly intact to light touch all dermatomal regions.   Motor Function:  Fully intact motor function at hip, knee, foot and ankle    DTRs;  quadriceps and  achilles 2+.  No  clonus and downgoing Babinski.    Vascular status:  DP and PT pulses 2+, brisk capillary refill, symmetric.     IMAGING:    XRAY (10/10/17):    TECHNIQUE: 4 view radiographs of the right knee and 4 view radiographs of the left knee, including standing.    COMPARISON: N/A      FINDINGS:     Fracture: None.    Joint Space: No effusion.     Soft Tissues: Normal.     MRI Left Knee:    Findings: Routine knee MRI protocol performed without IV contrast. The cruciate ligaments are intact. The lateral collateral and medial collateral ligament complexes are intact. The extensor mechanism is unremarkable. No joint effusion . The lateral meniscus is unremarkable. There is increased linear signal in the posterior horn of the medial meniscus, but does not extend to the articular surface. Generalized cartilage thinning. Small focus of cartilage fissuring in the median eminence of the patella. No subchondral marrow changes. No high-grade chondromalacia identified. The muscle signal is within normal limits. The marrow signal is unremarkable.    Impression:    No discrete meniscal tear. Increased signal noted in the medial meniscus posterior horn, but does not extend to the articular surface.      Small focus of cartilage fissuring in the patella. No high-grade chondromalacia identified.     ASSESSMENT:    Left knee pain    Treatment options were discussed with the patient about her left knee. I made the decision to obtain old records of the patient including previous notes and imaging. I independently reviewed and interpreted her radiographs and/or MRIs today as well as prior imaging. Results of patient's left knee MRI were discussed in detail with the patient and she had the opportunity to ask all questions. I reviewed the MRI images with her, including the relevant anatomy, and what this means for her knee. We discussed both non-operative and operative options for her knee and the risks and benefits of each. Time was spent  face-to-face with the patient during this encounter on counseling about treatment options including physical therapy, home exercise program, RICE, NSAIDs, CSI. I also coordinated patient's follow up care. The total face-to-face encounter time with this patient was 30 minutes and greater than 50% of of the encounter time was spent counseling the patient and coordinating care. Significant time was also spent educating the patient, giving detail post-visit instructions, and discussing risks and benefits of treatment. Patient also had several specific questions that were answered during the visit today.    PLAN:   1. Continue Physical therapy  2. Mobic 15 mg 1 time daily PRN for pain management. Patient understands to take with food and/or OTC prilosec to decrease GI side effects.  3. Ice affected area 2x a day for 15 minutes for 1 week, then 1x day for 15 minutes as needed for pain management.  4. RTC to see Manoj Vang MD in 1 week for follow-up and to discuss further treatment options.    All questions were answered, pt will contact us for questions or concerns in the interim.

## 2018-01-15 ENCOUNTER — TELEPHONE (OUTPATIENT)
Dept: SPORTS MEDICINE | Facility: CLINIC | Age: 33
End: 2018-01-15

## 2018-01-15 NOTE — TELEPHONE ENCOUNTER
Left message regarding change in her appointment time.  Instructed her to call if she is unable to come to her new appointment.

## 2018-01-18 ENCOUNTER — TELEPHONE (OUTPATIENT)
Dept: SPORTS MEDICINE | Facility: CLINIC | Age: 33
End: 2018-01-18

## 2018-01-25 ENCOUNTER — OFFICE VISIT (OUTPATIENT)
Dept: SPORTS MEDICINE | Facility: CLINIC | Age: 33
End: 2018-01-25
Payer: COMMERCIAL

## 2018-01-25 VITALS
BODY MASS INDEX: 33.6 KG/M2 | SYSTOLIC BLOOD PRESSURE: 132 MMHG | DIASTOLIC BLOOD PRESSURE: 72 MMHG | WEIGHT: 240 LBS | HEART RATE: 87 BPM | HEIGHT: 71 IN

## 2018-01-25 DIAGNOSIS — M25.569 KNEE PAIN, UNSPECIFIED CHRONICITY, UNSPECIFIED LATERALITY: Primary | ICD-10-CM

## 2018-01-25 DIAGNOSIS — M25.562 LEFT KNEE PAIN, UNSPECIFIED CHRONICITY: ICD-10-CM

## 2018-01-25 PROCEDURE — 99214 OFFICE O/P EST MOD 30 MIN: CPT | Mod: S$GLB,,, | Performed by: ORTHOPAEDIC SURGERY

## 2018-01-25 PROCEDURE — 99999 PR PBB SHADOW E&M-EST. PATIENT-LVL III: CPT | Mod: PBBFAC,,, | Performed by: ORTHOPAEDIC SURGERY

## 2018-03-01 ENCOUNTER — OFFICE VISIT (OUTPATIENT)
Dept: FAMILY MEDICINE | Facility: CLINIC | Age: 33
End: 2018-03-01
Payer: COMMERCIAL

## 2018-03-01 VITALS
BODY MASS INDEX: 34.23 KG/M2 | SYSTOLIC BLOOD PRESSURE: 112 MMHG | DIASTOLIC BLOOD PRESSURE: 70 MMHG | OXYGEN SATURATION: 98 % | WEIGHT: 244.5 LBS | HEART RATE: 81 BPM | HEIGHT: 71 IN

## 2018-03-01 DIAGNOSIS — F33.2 SEVERE EPISODE OF RECURRENT MAJOR DEPRESSIVE DISORDER, WITHOUT PSYCHOTIC FEATURES: ICD-10-CM

## 2018-03-01 DIAGNOSIS — M41.9 SCOLIOSIS, UNSPECIFIED SCOLIOSIS TYPE, UNSPECIFIED SPINAL REGION: Primary | ICD-10-CM

## 2018-03-01 PROBLEM — M25.562 ACUTE PAIN OF LEFT KNEE: Status: RESOLVED | Noted: 2017-12-22 | Resolved: 2018-03-01

## 2018-03-01 PROBLEM — Z74.09 IMPAIRED MOBILITY: Status: RESOLVED | Noted: 2017-12-22 | Resolved: 2018-03-01

## 2018-03-01 PROCEDURE — 99999 PR PBB SHADOW E&M-EST. PATIENT-LVL III: CPT | Mod: PBBFAC,,,

## 2018-03-01 PROCEDURE — 99214 OFFICE O/P EST MOD 30 MIN: CPT | Mod: S$GLB,,,

## 2018-03-01 RX ORDER — DULOXETIN HYDROCHLORIDE 60 MG/1
60 CAPSULE, DELAYED RELEASE ORAL DAILY
Qty: 30 CAPSULE | Refills: 11 | Status: SHIPPED | OUTPATIENT
Start: 2018-03-01 | End: 2018-03-26 | Stop reason: SDUPTHER

## 2018-03-01 NOTE — PROGRESS NOTES
Subjective:       Patient ID: Geri Mosqueda is a 32 y.o. female.    Chief Complaint: Back Pain and Medication Refill    HPI The patient is having problems with back pain secondary to scoliosis. She was diagnosed in 6th grade. She was not in spine clinic. She had X-Rays at her chiropractor. She goes to the chiropractor weekly but this is hard to do with her schedule. She is being treated with traction, electrical stimulation, manipulation and massage. This helps for a couple of days. The pain is located in the lower thoracic and lower back. It is worse as the day progresses. This is interfering with her activity. She tries to swim but she doesn't get a lot a benefit and may make her worse. Never been to pain specialist or spine clinic. Described as an aching daily pain constant. Gets flare ups. It becomes pulsating. Non-radiating. Some weakness in the legs. It is moderate to severe in intensity. She is taking escitalopram for anxiety and depression and is willing to try duloxetine.     Review of Systems   Constitutional: Negative.    Respiratory: Negative.  Negative for shortness of breath.    Cardiovascular: Negative.  Negative for chest pain.   Psychiatric/Behavioral: Negative.    All other systems reviewed and are negative.      Objective:      Vitals:    03/01/18 0703   BP: 112/70   Pulse: 81     Physical Exam   Constitutional: She is oriented to person, place, and time. She appears well-developed and well-nourished. She is active.  Non-toxic appearance. She does not have a sickly appearance. She does not appear ill. No distress.   HENT:   Head: Normocephalic and atraumatic.   Right Ear: External ear normal.   Left Ear: External ear normal.   Nose: Nose normal.   Hearing normal.    Eyes: Conjunctivae are normal. Pupils are equal, round, and reactive to light.   Neck: Normal range of motion. Neck supple. No thyroid mass and no thyromegaly present.   Cardiovascular: Normal rate, regular rhythm, normal heart  sounds and intact distal pulses.  Exam reveals no gallop and no friction rub.    No murmur heard.  Pulses:       Dorsalis pedis pulses are 2+ on the right side, and 2+ on the left side.        Posterior tibial pulses are 2+ on the right side, and 2+ on the left side.   Pulmonary/Chest: Effort normal and breath sounds normal. No respiratory distress. She exhibits no tenderness.   Musculoskeletal: Normal range of motion. She exhibits no edema.   Slight curvature spine    Lymphadenopathy:     She has no cervical adenopathy.   Neurological: She is alert and oriented to person, place, and time. She has normal strength. Coordination and gait normal.   Skin: Skin is warm and dry. She is not diaphoretic. No pallor.   Psychiatric: She has a normal mood and affect. Her speech is normal and behavior is normal. Judgment and thought content normal. Cognition and memory are normal.   Vitals reviewed.      Assessment:       1. Scoliosis, unspecified scoliosis type, unspecified spinal region    2. Severe episode of recurrent major depressive disorder, without psychotic features        Plan:       Scoliosis, unspecified scoliosis type, unspecified spinal region  -     X-Ray Scoliosis Complete; Future; Expected date: 03/01/2018  -     DULoxetine (CYMBALTA) 60 MG capsule; Take 1 capsule (60 mg total) by mouth once daily.  Dispense: 30 capsule; Refill: 11  -     Ambulatory referral to Pain Clinic    Severe episode of recurrent major depressive disorder, without psychotic features      Follow-up in about 2 months (around 5/1/2018).

## 2018-03-12 ENCOUNTER — PATIENT MESSAGE (OUTPATIENT)
Dept: FAMILY MEDICINE | Facility: CLINIC | Age: 33
End: 2018-03-12

## 2018-03-12 DIAGNOSIS — M41.9 SCOLIOSIS, UNSPECIFIED SCOLIOSIS TYPE, UNSPECIFIED SPINAL REGION: Primary | ICD-10-CM

## 2018-03-12 NOTE — PROGRESS NOTES
Chronic Pain - New Consult    Referring Physician: Gerry Polanco Jr.*      Chief Complaint   Patient presents with    Back Pain        SUBJECTIVE:    Geri Mosqueda presents to the clinic for the evaluation of back pain. The pain started >20 years ago and symptoms have been persistent. No trauma or inciting event. The pain is located in the mid-lower back area along the midline and is non-radiating. The majority of her pain is located along the thoracolumbar junction. The pain is constant with flares. The pain is described as aching, dull, sharp and stabbing and is rated as 6/10. Symptoms interfere with daily activity and work. The pain is exacerbated by standing for a long periods of time. She works as a  and needs to stand for long periods of time which makes work difficult. She has tried chiropractic care and massage which provides only short term relief. She will take Advil on occasion which helps. She has tried Mobic and stopped due to side effects.    Patient denies urinary incontinence, bowel incontinence, significant motor weakness and loss of sensations.    Physical Therapy: No      Pain Disability Index Review:  Last 3 PDI Scores 3/13/2018   Pain Disability Index (PDI) 30       Pain Medications:    - Cymbalta 60 mg daily     report: Reviewed    Imaging:     Scoliosis Complete (3/2/2018):    Findings: There is a minimal dextroscoliosis of the lumbar spine and levoscoliosis of the lower thoracic spine. There is reversal of the normal cervical lordosis which may be positional. There is no fracture, dislocation, or bone erosion.    Past Medical History:   Diagnosis Date    DVT (deep venous thrombosis) 2/11/2014    after surgery    Follicular adenoma of thyroid gland 2/6/2014    PE (pulmonary embolism) 2/11/2014    after surgery     Past Surgical History:   Procedure Laterality Date    THYROID LOBECTOMY Left 2/6/2014    and isthmusectomy    TONSILLECTOMY       Social History      Social History    Marital status: Single     Spouse name: N/A    Number of children: N/A    Years of education: N/A     Occupational History     [employer]     Social History Main Topics    Smoking status: Never Smoker    Smokeless tobacco: Never Used    Alcohol use No    Drug use: No    Sexual activity: Not on file     Other Topics Concern    Not on file     Social History Narrative    Living with her boyfriend. She is from [place]. Her mother and step-father live in [place] [facility]. She is a  and teaches voice in elementary. She attended school at [employer] and graduated from [employer]. 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      Family History   Problem Relation Age of Onset    Depression Mother     Diabetes Mother     Hypertension Mother     Hypertension Daughter     Heart attack Maternal Grandmother     Hypertension Maternal Grandmother     Cancer Brother     Heart disease Neg Hx     Anemia Neg Hx     Arrhythmia Neg Hx     Asthma Neg Hx     Clotting disorder Neg Hx     Fainting Neg Hx     Heart failure Neg Hx     Hyperlipidemia Neg Hx        Review of patient's allergies indicates:   Allergen Reactions    Percocet [oxycodone-acetaminophen]        Current Outpatient Prescriptions   Medication Sig    copper (PARAGARD T 380A) 380 square mm IUD 1 each by Intrauterine route.    DULoxetine (CYMBALTA) 60 MG capsule Take 1 capsule (60 mg total) by mouth once daily.     No current facility-administered medications for this visit.        REVIEW OF SYSTEMS:  GENERAL: No weight loss, malaise or fevers.  HEENT: Negative for frequent or significant headaches.  NECK: Negative for lumps or significant neck swelling.  RESPIRATORY: Negative for wheezing or shortness of breath.  CARDIOVASCULAR: Negative for chest pain or palpitations.  GI: No blood in stools or black stools or change in bowel habits.  : Negative for kidney stones, urinary tract infections, or  "incontinence.  MUSCULOSKELETAL: See HPI  SKIN: Negative for rash or itching.  PSYCH: + sleep disturbance  HEMATOLOGY/LYMPHOLOGY Negative for prolonged bleeding, bruising easily or swollen nodes.  NEURO:  No history of syncope, seizures or tremors.    OBJECTIVE:    /60 (BP Location: Right arm, Patient Position: Sitting, BP Method: Large (Manual))   Pulse 75   Ht 5' 11" (1.803 m)   Wt 109.1 kg (240 lb 8 oz)   SpO2 98%   BMI 33.54 kg/m²     PHYSICAL EXAMINATION:  GENERAL: Well appearing, in no acute distress. Overweight.  PSYCH:  Mood and affect is appropriate.  Awake, alert, and oriented x 3.  SKIN: Skin color, texture, turgor normal, no rashes or lesions  HEENT: Normocephalic, atraumatic.  EOM intact.  CV: Radial pulses are 2+.  RESP:  Respirations are unlabored.  GI: Abdomen soft and non-tender.  MSK:  No atrophy or tone abnormalities are noted.      Neck: No pain with neck flexion, extension, or lateral rotation.  No obvious deformity or signs of trauma.  Normal cervical spine range of motion.    Back: Straight leg raising in the sitting and supine positions is negative for  radicular pain. Mild tenderness to palpation over the lower thoracic and lumbar paraspinous muscles.  Negative for pain with facet loading and back extension/rotation. Normal range of motion without pain reproduction.    Buttocks:  No pain to palpation over the PSIS. Sacroiliac joint maneuvers are negative for pain.    Extremities:  Peripheral joint ROM is full and pain free without obvious instability or laxity in all four extremities. No edema or skin discolorations noted.     Gait:  Gait is normal.    NEUR:  Bilateral upper and lower extremity coordination and muscle stretch reflexes are physiologic and symmetric. Strength testing is 5/5 throughout all muscle groups in the upper and lower extremities. No loss of sensation is noted.     ASSESSMENT: 32 y.o. female with hx of mild scoliosis with chronic thoracolumbar back pain.    1. " Scoliosis of thoracolumbar spine, unspecified scoliosis type        PLAN:     - I have stressed the importance of physical activity and a home exercise plan to help with pain and improve health.  - Referral to Physical therapy for modalities, lumbar stabilization, core strengthening, and a home exercise program.  - OK to continue OTC Advil as needed for pain.  - In the future, I will consider T12/L1, L1/2, and L2/3 facet medial branch blocks if no relief from above.  - RTC in 4 weeks.    The above plan and management options were discussed at length with patient. Patient is in agreement with the above and verbalized understanding. It will be communicated with the referring physician via electronic record, fax, or mail.    Andrade Hampton III  03/13/2018

## 2018-03-13 ENCOUNTER — OFFICE VISIT (OUTPATIENT)
Dept: PAIN MEDICINE | Facility: CLINIC | Age: 33
End: 2018-03-13
Payer: COMMERCIAL

## 2018-03-13 VITALS
WEIGHT: 240.5 LBS | HEART RATE: 75 BPM | OXYGEN SATURATION: 98 % | SYSTOLIC BLOOD PRESSURE: 121 MMHG | BODY MASS INDEX: 33.67 KG/M2 | HEIGHT: 71 IN | DIASTOLIC BLOOD PRESSURE: 60 MMHG

## 2018-03-13 DIAGNOSIS — M41.9 SCOLIOSIS OF THORACOLUMBAR SPINE, UNSPECIFIED SCOLIOSIS TYPE: Primary | ICD-10-CM

## 2018-03-13 PROCEDURE — 99244 OFF/OP CNSLTJ NEW/EST MOD 40: CPT | Mod: S$GLB,,, | Performed by: ANESTHESIOLOGY

## 2018-03-13 PROCEDURE — 99999 PR PBB SHADOW E&M-EST. PATIENT-LVL III: CPT | Mod: PBBFAC,,, | Performed by: ANESTHESIOLOGY

## 2018-03-13 NOTE — LETTER
March 13, 2018      Gerry Polanco Jr., MD  [address] [place] [id number]           [facility]  [address] [place] [address]  [facility] [address]  Phone: [phone]  Fax: [phone]          Patient: Geri Mosqueda   MR Number: [id number]   YOB: 1985   Date of Visit: 3/13/2018       Dear Dr. Gerry Polanco Jr.:    Thank you for referring Geri Mosqueda to me for evaluation. Attached you will find relevant portions of my assessment and plan of care.    If you have questions, please do not hesitate to call me. I look forward to following Geri Mosqueda along with you.    Sincerely,    Andrade Hampton III, MD    Enclosure  CC:  No Recipients    If you would like to receive this communication electronically, please contact [email or web address] or [phone] to request more information on [facility] Link access.    For providers and/or their staff who would like to refer a patient to [facility], please contact us through our one-stop-shop provider referral line, [facility], at [phone].    If you feel you have received this communication in error or would no longer like to receive these types of communications, please e-mail [email or web address]

## 2018-03-26 ENCOUNTER — PATIENT MESSAGE (OUTPATIENT)
Dept: FAMILY MEDICINE | Facility: CLINIC | Age: 33
End: 2018-03-26

## 2018-03-26 DIAGNOSIS — M41.9 SCOLIOSIS, UNSPECIFIED SCOLIOSIS TYPE, UNSPECIFIED SPINAL REGION: ICD-10-CM

## 2018-03-26 RX ORDER — DULOXETIN HYDROCHLORIDE 30 MG/1
60 CAPSULE, DELAYED RELEASE ORAL DAILY
Qty: 30 CAPSULE | Refills: 11 | Status: SHIPPED | OUTPATIENT
Start: 2018-03-26 | End: 2018-05-14 | Stop reason: SDUPTHER

## 2018-03-29 ENCOUNTER — TELEPHONE (OUTPATIENT)
Dept: FAMILY MEDICINE | Facility: CLINIC | Age: 33
End: 2018-03-29

## 2018-03-29 NOTE — TELEPHONE ENCOUNTER
----- Message from Noe Be sent at 3/29/2018 12:48 PM CDT -----  Contact: [phone]  Patient called in returning your call. Please advise.

## 2018-03-29 NOTE — TELEPHONE ENCOUNTER
----- Message from Noe Be sent at 3/29/2018  3:09 PM CDT -----  Contact: [phone] self  Patient called in returning your call. Please advise.

## 2018-05-08 ENCOUNTER — OFFICE VISIT (OUTPATIENT)
Dept: SPINE | Facility: CLINIC | Age: 33
End: 2018-05-08
Payer: COMMERCIAL

## 2018-05-08 VITALS
BODY MASS INDEX: 33.88 KG/M2 | HEIGHT: 71 IN | HEART RATE: 100 BPM | WEIGHT: 242 LBS | DIASTOLIC BLOOD PRESSURE: 86 MMHG | SYSTOLIC BLOOD PRESSURE: 128 MMHG

## 2018-05-08 DIAGNOSIS — M54.2 NECK PAIN: ICD-10-CM

## 2018-05-08 DIAGNOSIS — G89.29 CHRONIC MIDLINE THORACIC BACK PAIN: ICD-10-CM

## 2018-05-08 DIAGNOSIS — G89.29 CHRONIC BILATERAL LOW BACK PAIN WITHOUT SCIATICA: Primary | ICD-10-CM

## 2018-05-08 DIAGNOSIS — M54.6 CHRONIC MIDLINE THORACIC BACK PAIN: ICD-10-CM

## 2018-05-08 DIAGNOSIS — M54.50 CHRONIC BILATERAL LOW BACK PAIN WITHOUT SCIATICA: Primary | ICD-10-CM

## 2018-05-08 PROCEDURE — 99999 PR PBB SHADOW E&M-EST. PATIENT-LVL III: CPT | Mod: PBBFAC,,, | Performed by: PHYSICIAN ASSISTANT

## 2018-05-08 PROCEDURE — 3008F BODY MASS INDEX DOCD: CPT | Mod: CPTII,S$GLB,, | Performed by: PHYSICIAN ASSISTANT

## 2018-05-08 PROCEDURE — 99214 OFFICE O/P EST MOD 30 MIN: CPT | Mod: S$GLB,,, | Performed by: PHYSICIAN ASSISTANT

## 2018-05-08 RX ORDER — DICLOFENAC SODIUM 75 MG/1
75 TABLET, DELAYED RELEASE ORAL 2 TIMES DAILY PRN
Qty: 60 TABLET | Refills: 2 | Status: SHIPPED | OUTPATIENT
Start: 2018-05-08 | End: 2018-08-07

## 2018-05-08 RX ORDER — METHOCARBAMOL 750 MG/1
750 TABLET, FILM COATED ORAL 3 TIMES DAILY PRN
Qty: 90 TABLET | Refills: 1 | Status: SHIPPED | OUTPATIENT
Start: 2018-05-08 | End: 2018-06-07

## 2018-05-08 NOTE — LETTER
May 8, 2018      Gerry Polanco Jr., MD  [address]  [place] [address]           [facility]  [address], [address]  [facility] [address]  Phone: [phone]  Fax: [phone]          Patient: Geri Mosqueda   MR Number: [id number]   YOB: 1985   Date of Visit: 5/8/2018       Dear Dr. Gerry Polanco Jr.:    Thank you for referring Geri Mosqueda to me for evaluation. Attached you will find relevant portions of my assessment and plan of care.    If you have questions, please do not hesitate to call me. I look forward to following Geri Mosqueda along with you.    Sincerely,    Abbie Jefferson PA-C    Enclosure  CC:  No Recipients    If you would like to receive this communication electronically, please contact [email or web address] or [phone] to request more information on [facility] Link access.    For providers and/or their staff who would like to refer a patient to [facility], please contact us through our one-stop-shop provider referral line, [facility], at [phone].    If you feel you have received this communication in error or would no longer like to receive these types of communications, please e-mail [email or web address]

## 2018-05-08 NOTE — PROGRESS NOTES
Subjective:     Patient ID:  Geri Mosqueda is a 32 y.o. female.    Patient referred by Dr. Polanco    Chief Complaint:  Neck pain, midback pain, and low back pain    HPI    Geri Mosqueda is a 32 y.o. female who presents with the above CC.  Patient has had symptoms for the past 20 years in her whole back.  Pain in the neck is constant rated 7/10 and no position makes it worse or better.  No arm pain or paresthesias.  Pain in the mid to low back is constant rated 7/10 and worse with being in any position too long and better with switching positions.  In the past year she has tingling from the knees to the feet when sitting too long and no tingling when walking or standing.  No radiating leg pain.    The neck and low back pain bother her equally.     The low back is worse than the leg tingling.    Patient has not had PT or ESIs.  No spine surgery.  Has been going to the chiropractor on and off for the past 5 years that helps for a few days.  Patient is currently taking tylenol.    Patient denies any recent accidents or trauma, no saddle anesthesias, and no bowel or bladder incontinence.    Patient has difficulty with balance or gait, no difficulty tying shoes or buttoning clothes, is dropping things, has difficulty opening containers, and has had no change in handwriting.      Review of Systems:  Please refer to page three of the spine center intake form for a complete review of systems.    Past Medical History:   Diagnosis Date    DVT (deep venous thrombosis) 2/11/2014    after surgery    Follicular adenoma of thyroid gland 2/6/2014    PE (pulmonary embolism) 2/11/2014    after surgery     Past Surgical History:   Procedure Laterality Date    THYROID LOBECTOMY Left 2/6/2014    and isthmusectomy    TONSILLECTOMY       Current Outpatient Prescriptions on File Prior to Visit   Medication Sig Dispense Refill    copper (PARAGARD T 380A) 380 square mm IUD 1 each by Intrauterine route.      DULoxetine  (CYMBALTA) 30 MG capsule Take 2 capsules (60 mg total) by mouth once daily. 30 capsule 11    hyoscyamine (ANASPAZ,LEVSIN) 0.125 mg Tab Take 1 tablet (125 mcg total) by mouth every 6 (six) hours as needed (CRAMPING). 20 tablet 0    ondansetron (ZOFRAN) 8 MG tablet Take 1 tablet (8 mg total) by mouth every 8 (eight) hours as needed. 15 tablet 0    ondansetron (ZOFRAN-ODT) 4 MG TbDL Take 8 mg by mouth every 12 (twelve) hours.      ciprofloxacin HCl (CIPRO) 500 MG tablet Take 500 mg by mouth every 12 (twelve) hours.       No current facility-administered medications on file prior to visit.      Review of patient's allergies indicates:   Allergen Reactions    Percocet [oxycodone-acetaminophen]      Social History     Social History    Marital status: Single     Spouse name: N/A    Number of children: N/A    Years of education: N/A     Occupational History     [employer]     Social History Main Topics    Smoking status: Never Smoker    Smokeless tobacco: Never Used    Alcohol use No    Drug use: No    Sexual activity: Not on file     Other Topics Concern    Not on file     Social History Narrative    Living with her boyfriend. She is from [place]. Her mother and step-father live in [facility]. She is a  and teaches voice in elementary. She attended school at [employer] and graduated from [employer], [place]. 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      Family History   Problem Relation Age of Onset    Depression Mother     Diabetes Mother     Hypertension Mother     Hypertension Daughter     Heart attack Maternal Grandmother     Hypertension Maternal Grandmother     Cancer Brother     Heart disease Neg Hx     Anemia Neg Hx     Arrhythmia Neg Hx     Asthma Neg Hx     Clotting disorder Neg Hx     Fainting Neg Hx     Heart failure Neg Hx     Hyperlipidemia Neg Hx        Objective:      Vitals:    05/08/18 1628   BP: 128/86   Pulse: 100   Weight: 109.8 kg (242 lb)   Height: 5'  "11" (1.803 m)   PainSc:   6   PainLoc: Back         Physical Exam:    General:  Geri Mosqueda is well-developed, well-nourished, appears stated age, in no acute distress, alert and oriented to person, place, and time.    Pulmonary/Chest:  Respiratory effort normal  Abdominal: Exhibits no distension  Psychiatric:  Normal mood and affect.  Behavior is normal.  Judgement and thought content normal    Musculoskeletal:    Patient arises from a sitting to standing position without difficulty.  Patient walks to the door without evidence of limp, pain, or abnormality of gait. Patient is able to walk heel to toe without difficulty.  Patient is able to walk on heels and toes without difficulty.    Cervical ROM:   Pain in cervical spine flexion and extension.  No pain in right lateral bending, left lateral bending, right rotation, and left rotation.    Cervical Spine Palpation:  No tenderness to cervical spine palpation.    Cervical Spine Inspection:  Normal with no surgical scars and no visible rashes.    Lumbar ROM:   Pain in lumbar flexion in the low back.  Pain in extension in the midback.  No pain in right lateral bending, and left lateral bending.    Lumbar Spine Inspection:  Normal with no surgical scars and no visible rashes.    Lumbar Spine Palpation:  No tenderness to low back palpation.    SI Joint Palpation:  No tenderness to SI Joint palpation.    Straight Leg Raise:  Negative right and left SLR.    Neurological:  Alert and oriented to person, place, and time    Muscle strength against resistance:     Right Left   Deltoid  5 / 5 5 / 5   Biceps 5 / 5 5 / 5   Triceps 5 / 5 5 / 5   Wrist flexion  5 / 5 5 / 5   Wrist extension 5 / 5 5 / 5   Finger abduction 5 / 5 5 / 5   Thumb opposition 5 / 5 5 / 5   Handgrip 5 / 5 5 / 5   Hip flexion  5 / 5 5 / 5   Hip extension 5 / 5 5 / 5   Hip abduction 5 / 5 5 / 5   Hip adduction 5/ 5 5 / 5   Knee extension  5 / 5 5 / 5   Knee flexion  5 / 5 5 / 5   Dorsiflexion  5 / 5 5 / 5 "   EHL  5 / 5 5 / 5   Plantar flexion  5 / 5 5 / 5   Inversion of the feet 5 / 5 5 / 5   Eversion of the feet 5 / 5 5 / 5     Reflexes:     Right Left   Triceps 2+ 2+   Biceps 2+ 2+   Brachioradialis 2+ 2+   Patellar 2+ 2+   Achilles 2+ 2+     Babinski: Negative bilaterally  Clonus:  Negative bilaterally  Rudd: Negative bilaterally    On gross examination of the bilateral upper and lower extremities, patient has no signs of clubbing, cyanosis, or edema.     XRAY Results:    Narrative     Scoliosis complete.    Findings: There is a minimal dextroscoliosis of the lumbar spine and levoscoliosis of the lower thoracic spine. There is reversal of the normal cervical lordosis which may be positional. There is no fracture, dislocation, or bone erosion.   Impression      As above.      Electronically signed by: AZRA BORGES MD  Date: 03/02/18  Time: 15:49          Assessment:          1. Chronic bilateral low back pain without sciatica    2. Neck pain    3. Chronic midline thoracic back pain            Plan:          Orders Placed This Encounter    diclofenac (VOLTAREN) 75 MG EC tablet    methocarbamol (ROBAXIN) 750 MG Tab       Chronic neck, thoracic, and lumbar spine    -Recommend PT which has already ordered by another provider  -Diclofenac PRN with food  -Robaxin PRN  -FU in three months and if symptoms persist would recommend MRI scanning and possible injections  -She has seen Dr. Andrade Hampton III in pain management already    Follow-Up:  Follow-up in 3 months (on 8/8/2018). If there are any questions prior to this, the patient was instructed to contact the office.       Abbie Jefferson, [facility], PA-C  Neurosurgery  [facility]  [facility]

## 2018-05-11 ENCOUNTER — OFFICE VISIT (OUTPATIENT)
Dept: INTERNAL MEDICINE | Facility: CLINIC | Age: 33
End: 2018-05-11
Payer: COMMERCIAL

## 2018-05-11 VITALS
DIASTOLIC BLOOD PRESSURE: 70 MMHG | OXYGEN SATURATION: 96 % | BODY MASS INDEX: 35.03 KG/M2 | WEIGHT: 250.25 LBS | HEIGHT: 71 IN | SYSTOLIC BLOOD PRESSURE: 130 MMHG | HEART RATE: 97 BPM | RESPIRATION RATE: 16 BRPM

## 2018-05-11 DIAGNOSIS — R63.5 ABNORMAL WEIGHT GAIN: ICD-10-CM

## 2018-05-11 DIAGNOSIS — L81.9 HYPERPIGMENTED SKIN LESION: Primary | ICD-10-CM

## 2018-05-11 DIAGNOSIS — Z86.718 HISTORY OF DVT OF LOWER EXTREMITY: ICD-10-CM

## 2018-05-11 DIAGNOSIS — E89.0 H/O PARTIAL THYROIDECTOMY: ICD-10-CM

## 2018-05-11 DIAGNOSIS — N91.2 AMENORRHEA, UNSPECIFIED: ICD-10-CM

## 2018-05-11 DIAGNOSIS — Z13.6 SCREENING FOR CARDIOVASCULAR CONDITION: ICD-10-CM

## 2018-05-11 DIAGNOSIS — L68.0 HIRSUTISM: ICD-10-CM

## 2018-05-11 DIAGNOSIS — Z86.711 HISTORY OF PULMONARY EMBOLISM: Chronic | ICD-10-CM

## 2018-05-11 DIAGNOSIS — F51.01 PRIMARY INSOMNIA: ICD-10-CM

## 2018-05-11 PROCEDURE — 99999 PR PBB SHADOW E&M-EST. PATIENT-LVL IV: CPT | Mod: PBBFAC,,, | Performed by: INTERNAL MEDICINE

## 2018-05-11 PROCEDURE — 3008F BODY MASS INDEX DOCD: CPT | Mod: CPTII,S$GLB,, | Performed by: INTERNAL MEDICINE

## 2018-05-11 PROCEDURE — 99214 OFFICE O/P EST MOD 30 MIN: CPT | Mod: S$GLB,,, | Performed by: INTERNAL MEDICINE

## 2018-05-11 NOTE — PROGRESS NOTES
"Subjective:      Patient ID: Geri Mosqueda is a 32 y.o. female.    Chief Complaint: Establish Care; Insomnia; and Thyroid    HPI: 32 y.o. Black or  female, previous pt of Dr. Polanco.  Issues:  Hyperpigmented rash on neck,arms--- had 2 separate Derm look at, no diagnosis  -worsening hirsuitism  -ammenorrhea, has copper IUD, due to a DVT, with bilateral PE, post surgery for  A thyroid adenoma of isthmus removal, and sitting in car for 2hrs.  -38# weight gain in 5years.  -nulliparous       I have reviewed the chart,notes labs.  Reviewed PMH,SH,FH.    Review of Systems   Constitutional: Positive for activity change and unexpected weight change.   HENT: Negative for hearing loss, rhinorrhea and trouble swallowing.    Eyes: Negative for discharge and visual disturbance.   Respiratory: Negative for chest tightness and wheezing.    Cardiovascular: Negative for chest pain and palpitations.   Gastrointestinal: Positive for constipation, diarrhea and vomiting. Negative for blood in stool.   Endocrine: Negative for polydipsia and polyuria.   Genitourinary: Positive for menstrual problem. Negative for difficulty urinating, dysuria and hematuria.   Musculoskeletal: Positive for arthralgias and neck pain. Negative for joint swelling.   Skin: Positive for color change and rash.   Neurological: Positive for weakness and headaches.   Psychiatric/Behavioral: Positive for dysphoric mood. Negative for confusion.       Objective:   /70 (BP Location: Right arm, Patient Position: Sitting, BP Method: Large (Manual))   Pulse 97   Resp 16   Ht 5' 11" (1.803 m)   Wt 113.5 kg (250 lb 3.6 oz)   LMP 04/24/2018   SpO2 96%   BMI 34.90 kg/m²     Physical Exam   Constitutional: She is oriented to person, place, and time. She appears well-developed and well-nourished.   HENT:   Head: Normocephalic and atraumatic.   Right Ear: External ear normal.   Left Ear: External ear normal.   Nose: Nose normal.   Mouth/Throat: " Oropharynx is clear and moist.   Eyes: Conjunctivae and EOM are normal. Pupils are equal, round, and reactive to light.   Neck: Normal range of motion. Neck supple.   Cardiovascular: Normal rate, regular rhythm and normal heart sounds.    Pulmonary/Chest: Effort normal and breath sounds normal.   Abdominal: Soft. Bowel sounds are normal.   Neurological: She is alert and oriented to person, place, and time.   Skin: Skin is warm and dry. Rash noted.   Hyperpigmented blots on arms,neck.  hirsuit   Psychiatric: She has a normal mood and affect. Her behavior is normal. Judgment and thought content normal.   Nursing note and vitals reviewed.      Assessment:     1. Hyperpigmented skin lesion    2. Hirsutism    3. Abnormal weight gain    4. Amenorrhea, unspecified    5. History of pulmonary embolism    6. Primary insomnia    7. History of DVT of lower extremity    8. Screening for cardiovascular condition    9. H/O partial thyroidectomy      Plan:     Hyperpigmented skin lesion  -     Ambulatory referral to Dermatology  -     RPR; Future; Expected date: 05/11/2018  -     Anti Sm/RNP Antibody; Future; Expected date: 05/11/2018  -     DASHA IFA screen with reflex to titer and pattern; Future; Expected date: 05/11/2018  -     C3 COMPLEMENT; Future; Expected date: 05/11/2018  -     C4 COMPLEMENT; Future; Expected date: 05/11/2018    Hirsutism  -     Cortisol; Future; Expected date: 05/11/2018  -     Follicle stimulating hormone; Future; Expected date: 05/11/2018  -     Estradiol; Future; Expected date: 05/11/2018  -     Testosterone; Future; Expected date: 05/11/2018  -     Luteinizing hormone; Future; Expected date: 05/11/2018  -     Ambulatory referral to Dermatology    Abnormal weight gain  -     Hemoglobin A1c; Future; Expected date: 05/11/2018    Amenorrhea, unspecified    History of pulmonary embolism  -     DASHA; Future; Expected date: 05/11/2018  -     DRVVT; Future; Expected date: 05/11/2018  -     Sjogrens syndrome-A  extractable nuclear antibody; Future; Expected date: 05/11/2018  -     Sjogrens syndrome-B extractable nuclear antibody; Future; Expected date: 05/11/2018    Primary insomnia  -     Ambulatory consult to Sleep Disorders    History of DVT of lower extremity  -     DASHA; Future; Expected date: 05/11/2018  -     DRVVT; Future; Expected date: 05/11/2018  -     Sjogrens syndrome-A extractable nuclear antibody; Future; Expected date: 05/11/2018  -     Sjogrens syndrome-B extractable nuclear antibody; Future; Expected date: 05/11/2018    Screening for cardiovascular condition  -     Lipid panel; Future; Expected date: 05/11/2018    H/O partial thyroidectomy  -     TSH; Future; Expected date: 05/11/2018  -     T4, free; Future; Expected date: 05/11/2018

## 2018-05-14 ENCOUNTER — PATIENT MESSAGE (OUTPATIENT)
Dept: INTERNAL MEDICINE | Facility: CLINIC | Age: 33
End: 2018-05-14

## 2018-05-14 DIAGNOSIS — M41.9 SCOLIOSIS, UNSPECIFIED SCOLIOSIS TYPE, UNSPECIFIED SPINAL REGION: ICD-10-CM

## 2018-05-14 RX ORDER — DULOXETIN HYDROCHLORIDE 30 MG/1
60 CAPSULE, DELAYED RELEASE ORAL DAILY
Qty: 180 CAPSULE | Refills: 0 | Status: SHIPPED | OUTPATIENT
Start: 2018-05-14 | End: 2018-10-30 | Stop reason: SDUPTHER

## 2018-05-28 ENCOUNTER — INITIAL CONSULT (OUTPATIENT)
Dept: DERMATOLOGY | Facility: CLINIC | Age: 33
End: 2018-05-28
Payer: COMMERCIAL

## 2018-05-28 DIAGNOSIS — L98.9 DERMATOSIS: Primary | ICD-10-CM

## 2018-05-28 PROCEDURE — 99214 OFFICE O/P EST MOD 30 MIN: CPT | Mod: S$GLB,,, | Performed by: NURSE PRACTITIONER

## 2018-05-28 PROCEDURE — 99999 PR PBB SHADOW E&M-EST. PATIENT-LVL III: CPT | Mod: PBBFAC,,, | Performed by: NURSE PRACTITIONER

## 2018-05-28 NOTE — PROGRESS NOTES
Subjective:       Patient ID:  Geri Mosqueda is a 32 y.o. female who presents for   Chief Complaint   Patient presents with    Skin Discoloration     all over body     Skin Discoloration  - Initial  Affected locations: neck, left arm and right arm  Duration: 5 years  Signs and Symptoms: dark spots; initally started on neck and spread to bilateral arms.  Severity: mild  Timing: constant  Aggravated by: nothing  Treatments tried: previously seen by Dr. Randi Carter (3/2017), biopsy done, negative for fungus. referred to Dr. Lamas- biopsy also done and patch testing. Recommended nickel free diet; rx topicals given- pt unsure of name?        Review of Systems   Genitourinary: Positive for irregular periods.   Skin: Positive for daily sunscreen use and activity-related sunscreen use.   Hematologic/Lymphatic: Does not bruise/bleed easily.        Objective:    Physical Exam   Constitutional: She appears well-developed and well-nourished. No distress.   Neurological: She is alert and oriented to person, place, and time. She is not disoriented.   Psychiatric: She has a normal mood and affect.   Skin:   Areas Examined (abnormalities noted in diagram):   Head / Face Inspection Performed  Neck Inspection Performed  Chest / Axilla Inspection Performed  Back Inspection Performed  RUE Inspected  LUE Inspection Performed  RLE Inspected  LLE Inspection Performed                   Diagram Legend     Erythematous scaling macule/papule c/w actinic keratosis       Vascular papule c/w angioma      Pigmented verrucoid papule/plaque c/w seborrheic keratosis      Yellow umbilicated papule c/w sebaceous hyperplasia      Irregularly shaped tan macule c/w lentigo     1-2 mm smooth white papules consistent with Milia      Movable subcutaneous cyst with punctum c/w epidermal inclusion cyst      Subcutaneous movable cyst c/w pilar cyst      Firm pink to brown papule c/w dermatofibroma      Pedunculated fleshy papule(s) c/w skin tag(s)       Evenly pigmented macule c/w junctional nevus     Mildly variegated pigmented, slightly irregular-bordered macule c/w mildly atypical nevus      Flesh colored to evenly pigmented papule c/w intradermal nevus       Pink pearly papule/plaque c/w basal cell carcinoma      Erythematous hyperkeratotic cursted plaque c/w SCC      Surgical scar with no sign of skin cancer recurrence      Open and closed comedones      Inflammatory papules and pustules      Verrucoid papule consistent consistent with wart     Erythematous eczematous patches and plaques     Dystrophic onycholytic nail with subungual debris c/w onychomycosis     Umbilicated papule    Erythematous-base heme-crusted tan verrucoid plaque consistent with inflamed seborrheic keratosis     Erythematous Silvery Scaling Plaque c/w Psoriasis     See annotation                  Assessment / Plan:        Dermatosis, NOS  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 Other    Request records from previous dermatologist Dr. Cathi Lamas and Randi Carter.    Patient instructed in importance in daily sun protection of at least spf 30. Sun avoidance and topical protection discussed.     Patient encouraged to wear hat for all outdoor exposure.     Also discussed sun protective clothing.    Will contact patient once all records from outside sources received. Defer biopsy today, would like to review previous records.        Patient also seen and evaluated today by collaborating physician, Dr. Maxwell.            Follow-up if symptoms worsen or fail to improve.

## 2018-05-28 NOTE — LETTER
May 28, 2018      Bethany Mckinney MD  [address]  [address]  [facility] [address]           [facility]  [address]  [place] [address]  Phone: [phone]  Fax: [phone]          Patient: Geri Mosqueda   MR Number: [id number]   YOB: 1985   Date of Visit: 5/28/2018       Dear Dr. Bethany Mckinney:    Thank you for referring Geri Mosqueda to me for evaluation. Attached you will find relevant portions of my assessment and plan of care.    If you have questions, please do not hesitate to call me. I look forward to following Geri Mosqueda along with you.    Sincerely,    Promise Lopez, NP    Enclosure  CC:  No Recipients    If you would like to receive this communication electronically, please contact [email or web address] or [phone] to request more information on [employer] access.    For providers and/or their staff who would like to refer a patient to [facility], please contact us through our one-stop-shop provider referral line, [facility], at [phone].    If you feel you have received this communication in error or would no longer like to receive these types of communications, please e-mail [email or web address]

## 2018-06-07 ENCOUNTER — TELEPHONE (OUTPATIENT)
Dept: DERMATOLOGY | Facility: CLINIC | Age: 33
End: 2018-06-07

## 2018-06-07 NOTE — TELEPHONE ENCOUNTER
Left message for patient to call back about seeing if she can come in to resign the medical release forms so we can send them to her doctors again since they said they did not receive them.

## 2018-06-25 ENCOUNTER — OFFICE VISIT (OUTPATIENT)
Dept: SLEEP MEDICINE | Facility: CLINIC | Age: 33
End: 2018-06-25
Payer: COMMERCIAL

## 2018-06-25 DIAGNOSIS — G47.30 SLEEP APNEA, UNSPECIFIED TYPE: Primary | ICD-10-CM

## 2018-06-25 PROCEDURE — 99204 OFFICE O/P NEW MOD 45 MIN: CPT | Mod: S$GLB,,, | Performed by: PSYCHIATRY & NEUROLOGY

## 2018-06-25 PROCEDURE — 99999 PR PBB SHADOW E&M-EST. PATIENT-LVL II: CPT | Mod: PBBFAC,,, | Performed by: PSYCHIATRY & NEUROLOGY

## 2018-06-25 NOTE — PROGRESS NOTES
Geri Mosqueda  was seen at the request of  Bethany Mckinney MD for sleep evaluation.    06/25/2018 INITIAL HISTORY OF PRESENT ILLNESS:  Geri Mosqueda is a 32 y.o. female is here to be evaluated for a sleep disorder.       CHIEF COMPLAINT:      The patient's complaints include excessive daytime sleepiness, excessive daytime fatigue, snoring and interrupted sleep since at least a couple of years ago.  Concerning with sleepiness behind the wheel.    Cymbalta - in AM    FH: younger brother LAYNE - had to get CPAP     Had surgery for thyroid mass and had PE post op in 2014. Recent TSH was NL.      EPWORTH SLEEPINESS SCALE 6/25/2018   Sitting and reading 3   Watching TV 2   Sitting, inactive in a public place (e.g. a theatre or a meeting) 2   As a passenger in a car for an hour without a break 3   Lying down to rest in the afternoon when circumstances permit 3   Sitting and talking to someone 1   Sitting quietly after a lunch without alcohol 2   In a car, while stopped for a few minutes in traffic 3   Total score 19       SLEEP ROUTINE AND LIFESTYLE 06/25/2018 :  Sleep Clinic New Patient 6/25/2018   What time do you go to bed on a week day? (Give a range) 11pm-1am   What time do you go to bed on a day off? (Give a range) 11pm-1am    How long does it take you to fall asleep? (Give a range) 30 minutes to an hour    On average, how many times per night do you wake up? 3-5   How long does it take you to fall back into sleep? (Give a range) a few minutes to a few hours    What time do you wake up to start your day on a week day? (Give a range) 5:30am   What time do you wake up to start your day on a day off? (Give a range) 10am   What time do you get out of bed? (Give a range) 6:30am   On average, how many hours do you sleep? 4-8   On average, how many naps do you take per day? 0   Rate your sleep quality from 0 to 5 (0-poor, 5-great). 1   Have you experienced:  Weight gain?   How much weight have you lost or  gained (in lbs.) in the last year? 30lbs   On average, how many times per night do you go to the bathroom?  1-2   Have you ever had a sleep study/CPAP machine/surgery for sleep apnea? No   Have you ever had a CPAP machine for sleep apnea? No   Have you ever had surgery for sleep apnea? No       Sleep Clinic ROS  6/25/2018   Difficulty breathing through the nose?  Sometimes   Sore throat or dry mouth in the morning? No   Irregular or very fast heart beat?  Sometimes   Shortness of breath?  Yes   Acid reflux? No   Body aches and pains?  Yes   Morning headaches? Sometimes   Dizziness? No   Mood changes?  Sometimes   Do you exercise?  No   Do you feel like moving your legs a lot?  No          Denies symptoms concerning for parasomnia      The patient had tonsillectomy in the past 2004      Social History:      Occupation:day -  in [employer] (taking [place] [employer] from [place]).  Bed partner: boyfriend  Exercise routine: no  Caffeine:  Coffee   yes, cokes  yes, tea  yes     PREVIOUS SLEEP STUDIES:   None    DME:       PAST MEDICAL HISTORY:    Active Ambulatory Problems     Diagnosis Date Noted    History of pulmonary embolism 04/24/2014    Primary insomnia 08/22/2016    Obesity (BMI 30-39.9) 03/27/2017    Family history of diabetes mellitus in mother 03/27/2017    Anxiety 03/27/2017    Chronic pain of left knee 11/01/2017    Acute pain of left knee 12/22/2017    Impaired mobility 12/22/2017    History of DVT of lower extremity 05/11/2018     Resolved Ambulatory Problems     Diagnosis Date Noted    Encounter for preventive health examination 08/26/2013    Deltoid tendinitis 11/07/2013    Thyroid nodule 11/23/2013    Thyroid mass 02/06/2014    DVT (deep venous thrombosis), left 04/24/2014    Long term (current) use of anticoagulants 04/24/2014     Past Medical History:   Diagnosis Date    Allergy     Blood clotting tendency     DVT (deep venous thrombosis) 2/11/2014    Follicular adenoma of  thyroid gland 2/6/2014    Keloid cicatrix     PE (pulmonary embolism) 2/11/2014                PAST SURGICAL HISTORY:    Past Surgical History:   Procedure Laterality Date    THYROID LOBECTOMY Left 2/6/2014    and isthmusectomy    TONSILLECTOMY           FAMILY HISTORY:                Family History   Problem Relation Age of Onset    Depression Mother     Diabetes Mother     Hypertension Mother     Hypertension Daughter     Heart attack Maternal Grandmother     Hypertension Maternal Grandmother     Cancer Brother     Heart disease Neg Hx     Anemia Neg Hx     Arrhythmia Neg Hx     Asthma Neg Hx     Clotting disorder Neg Hx     Fainting Neg Hx     Heart failure Neg Hx     Hyperlipidemia Neg Hx     Melanoma Neg Hx        SOCIAL HISTORY:          Tobacco:   History   Smoking Status    Never Smoker   Smokeless Tobacco    Never Used       alcohol use:    History   Alcohol Use No                   ALLERGIES:    Review of patient's allergies indicates:   Allergen Reactions    Percocet [oxycodone-acetaminophen]        CURRENT MEDICATIONS:    Current Outpatient Prescriptions   Medication Sig Dispense Refill    copper (PARAGARD T 380A) 380 square mm IUD 1 each by Intrauterine route.      diclofenac (VOLTAREN) 75 MG EC tablet Take 1 tablet (75 mg total) by mouth 2 (two) times daily as needed. 60 tablet 2    DULoxetine (CYMBALTA) 30 MG capsule Take 2 capsules (60 mg total) by mouth once daily. 180 capsule 0    ondansetron (ZOFRAN) 8 MG tablet Take 1 tablet (8 mg total) by mouth every 8 (eight) hours as needed. 15 tablet 0    ondansetron (ZOFRAN-ODT) 4 MG TbDL Take 8 mg by mouth every 12 (twelve) hours.      hyoscyamine (ANASPAZ,LEVSIN) 0.125 mg Tab Take 1 tablet (125 mcg total) by mouth every 6 (six) hours as needed (CRAMPING). 20 tablet 0     No current facility-administered medications for this visit.                     PHYSICAL EXAM:  There were no vitals taken for this visit.  GENERAL: Normal  "development, well groomed.  HEENT:   HEENT:  Conjunctivae are non-erythematous; Pupils equal, round, and reactive to light; Nose is symmetrical; Nasal mucosa is pink and moist; Septum is midline; Inferior turbinates are normal; Nasal airflow is normal; Posterior pharynx is pink; Modified Mallampati:"I; Posterior palate is low; Tonsils not visualized; Uvula is wide and elongated;Tongue is enlarged; Dentition is fair; No TMJ tenderness; Jaw opening and protrusion without click and without discomfort.  NECK: Supple. Neck circumference is  inches. No thyromegaly. No palpable nodes.     SKIN: On face and neck: No abrasions, no rashes, no lesions.  No subcutaneous nodules are palpable.  RESPIRATORY: Chest is clear to auscultation.  Normal chest expansion and non-labored breathing at rest.  CARDIOVASCULAR: Normal S1, S2.  No murmurs, gallops or rubs. No carotid bruits bilaterally.  No edema. No clubbing. No cyanosis.    NEURO: Oriented to time, place and person. Normal attention span and concentration. Gait normal.    PSYCH: Affect is full. Mood is normal  MUSCULOSKELETAL: Moves 4 extremities. Gait normal.         Using [facility]: yes      ASSESSMENT:    1. Sleep Apnea NEC. The patient symptomatically has  excessive daytime sleepiness, snoring, excessive daytime fatigue, interrupted sleep and nocturia  with exam findings of "a crowded oral airway and elevated body mass index. The patient has medical co-morbidities of PE,  which can be worsened by LAYNE. This warrants further investigation for possible obstructive sleep apnea.          PLAN:    Diagnostic: Polysomnogram home. The nature of this procedure and its indication was discussed with the patient. she would  like to come discuss PSG results.    1. Cut down on caffeine after 3 PM  2. Try to go bed earlier if possible to increase sleep hrs      3. SLEEP LAB ([facility] or [facility]) will contact you to schedulethe sleep study. Their number is [phone] (ext 2). Please call " them if you do not hear from them in 10 business days from now.  The [facility] Sleep Lab is located on 7th floor of the [facility]; [place] lab is located in [facility].    SLEEP CLINIC (my assistant) will call you when the sleep study results are ready - if you have not heard from us by 2 weeks from the date of the study, please call [phone] (ext 1) or you can use [facility] to contact me.    You are advised to abstain from driving should you feel sleepy or drowsy.          More than 25 minutes of this 45 minutes visit was spent in counseling: during our discussion today, we talked about the etiology of  LAYNE as well as the potential ramifications of untreated sleep apnea, which could include daytime sleepiness, hypertension, heart disease and/or stroke.  We discussed potential treatment options, which could include weight loss, body positioning, continuous positive airway pressure (CPAP), or referral for surgical consideration. Meanwhile, she  is urged to avoid supine sleep, weight gain and alcoholic beverages since all of these can worsen LAYNE.     Precautions: The patient was advised to abstain from driving should he feel sleepy or drowsy.    Follow up: MD/NP  after the sleep study has been completed.     Thank you for allowing me the opportunity to participate in the care of your patient.    This visit summary will be sent to referring provider via [facility]

## 2018-06-25 NOTE — LETTER
July 2, 2018      Bethany Mckinney MD  [address]  [address]  [place] [address]           [facility]  [address] [facility] [address]  Phone: [phone]  Fax: [phone]          Patient: Geri Mosqueda   MR Number: [id number]   YOB: 1985   Date of Visit: 6/25/2018       Dear Dr. Bethany Mckinney:    Thank you for referring Geri Mosqueda to me for evaluation. Attached you will find relevant portions of my assessment and plan of care.    If you have questions, please do not hesitate to call me. I look forward to following Geri Mosqueda along with you.    Sincerely,    Jane Oakley MD    Enclosure  CC:  No Recipients    If you would like to receive this communication electronically, please contact [email or web address] or [phone] to request more information on [facility] [employer] access.    For providers and/or their staff who would like to refer a patient to [facility], please contact us through our one-stop-shop provider referral line, [facility], at [phone].    If you feel you have received this communication in error or would no longer like to receive these types of communications, please e-mail [email or web address]

## 2018-06-25 NOTE — PATIENT INSTRUCTIONS
1. Cut down on caffeine after 3 PM  2. Try to go bed earlier if possible    SLEEP LAB ([facility] or [facility]) will contact you to schedulethe sleep study. Their number is [phone] (ext 2). Please call them if you do not hear from them in 10 business days from now.  The [facility] Sleep Lab is located on 7th floor of the [facility];     SLEEP CLINIC (my assistant) will call you when the sleep study results are ready - if you have not heard from us by 2 weeks from the date of the study, please call [phone] (ext 1) or you can use [facility] to contact me.    You are advised to abstain from driving should you feel sleepy or drowsy.

## 2018-06-26 ENCOUNTER — PATIENT MESSAGE (OUTPATIENT)
Dept: INTERNAL MEDICINE | Facility: CLINIC | Age: 33
End: 2018-06-26

## 2018-06-29 ENCOUNTER — TELEPHONE (OUTPATIENT)
Dept: SLEEP MEDICINE | Facility: OTHER | Age: 33
End: 2018-06-29

## 2018-07-10 ENCOUNTER — TELEPHONE (OUTPATIENT)
Dept: SLEEP MEDICINE | Facility: OTHER | Age: 33
End: 2018-07-10

## 2018-07-11 ENCOUNTER — HOSPITAL ENCOUNTER (OUTPATIENT)
Dept: SLEEP MEDICINE | Facility: OTHER | Age: 33
Discharge: HOME | End: 2018-07-11
Attending: PSYCHIATRY & NEUROLOGY
Payer: COMMERCIAL

## 2018-07-11 DIAGNOSIS — G47.30 SLEEP APNEA, UNSPECIFIED TYPE: ICD-10-CM

## 2018-07-11 PROCEDURE — 95800 SLP STDY UNATTENDED: CPT

## 2018-07-11 PROCEDURE — 95800 SLP STDY UNATTENDED: CPT | Mod: 26,,, | Performed by: PSYCHIATRY & NEUROLOGY

## 2018-07-13 ENCOUNTER — OFFICE VISIT (OUTPATIENT)
Dept: INTERNAL MEDICINE | Facility: CLINIC | Age: 33
End: 2018-07-13
Payer: COMMERCIAL

## 2018-07-13 VITALS
HEART RATE: 88 BPM | RESPIRATION RATE: 16 BRPM | SYSTOLIC BLOOD PRESSURE: 110 MMHG | HEIGHT: 71 IN | WEIGHT: 245.69 LBS | BODY MASS INDEX: 34.4 KG/M2 | DIASTOLIC BLOOD PRESSURE: 70 MMHG | OXYGEN SATURATION: 98 %

## 2018-07-13 DIAGNOSIS — Z86.711 HISTORY OF PULMONARY EMBOLISM: Chronic | ICD-10-CM

## 2018-07-13 DIAGNOSIS — F51.01 PRIMARY INSOMNIA: ICD-10-CM

## 2018-07-13 DIAGNOSIS — Z86.718 HISTORY OF DVT OF LOWER EXTREMITY: Primary | ICD-10-CM

## 2018-07-13 DIAGNOSIS — L68.0 FEMALE HIRSUTISM: ICD-10-CM

## 2018-07-13 PROCEDURE — 3008F BODY MASS INDEX DOCD: CPT | Mod: CPTII,S$GLB,, | Performed by: INTERNAL MEDICINE

## 2018-07-13 PROCEDURE — 99999 PR PBB SHADOW E&M-EST. PATIENT-LVL III: CPT | Mod: PBBFAC,,, | Performed by: INTERNAL MEDICINE

## 2018-07-13 PROCEDURE — 99213 OFFICE O/P EST LOW 20 MIN: CPT | Mod: S$GLB,,, | Performed by: INTERNAL MEDICINE

## 2018-07-13 NOTE — PROGRESS NOTES
Subjective:      Patient ID: Geri Mosqueda is a 33 y.o. female.    Chief Complaint: Follow-up (2 month follow up) and Results (lab results)    HPI: 33 y.o. Black or  female, here to review all her labs, which include:  Normal FSH,estradiol,LH,cortisol,testosterone  Normal collagen vascular profiles, including neg DASHA,RF,ESR  Normal DRVVT  Normal complements  And:  05/12/18 0932 TSH 1.810 - Final result   05/12/18 0931 HDL 47 - Final result   05/12/18 0931 CHOL 177 - Final result   05/12/18 0931 TRIG 132 - Final result   05/12/18 0931 LDLCALC 103.6 - Final result   05/12/18 0931 CHOLHDL 26.6 - Final result   05/12/18 0931 NONHDLCHOL 130 - Final result   05/12/18 0931 TOTALCHOLEST 3.8 - Final result   05/12/18 0931 HGBA1C 5.3 - Final result   05/02/18 1433 COLORU Yellow - Final result   05/02/18 1433 APPEARANCEUA Clear - Final result   05/02/18 1433 SPECGRAV 1.020 - Final result   05/02/18 1433 PHUR 7.0 - Final result   05/02/18 1433 KETONESU Negative - Final result   05/02/18 1433 OCCULTUA 2+ Abnormal Final result   05/02/18 1433 NITRITE Negative - Final result   05/02/18 1433 UROBILINOGEN Negative - Final result   08/16/15 1608 PREGNANCYTES Negative - Final result   08/12/14 1606 INR 3.5 - Final result   05/02/18 1433 LEUKOCYTESUR 1+ Abnormal Final result   05/02/18 1456 WBC 6.80 - Final result   05/02/18 1456 RBC 5.54 High Final result   05/02/18 1456 HGB 11.3 Low Final result   05/02/18 1456 HCT 35.1 Low Final result   05/02/18 1456 MCH 20.3 Low Final result   05/02/18 1456 RDW 18.7 High Final result   05/02/18 1456  High Final result   05/02/18 1456 MPV 7.3 Low Final result   05/02/18 1456 GLU 72 Low Final result   05/02/18 1456 BUN 10 - Final result   05/02/18 1456 CREATININE 0.8 - Final result   05/02/18 1456 CALCIUM 9.4 - Final result   05/02/18 1456  - Final result   05/02/18 1456 K 4.0 - Final result   05/02/18 1456  - Final result   05/02/18 1456 PROT 7.5 - Final  "result   05/02/18 1456 ALBUMIN 4.0 - Final result   05/02/18 1456 BILITOT 0.5 - Final result   05/02/18 1456 AST 21 - Final result   05/02/18 1456 ALKPHOS 78 - Final result   05/02/18 1456 CO2 26 - Final result   05/02/18 1456 ALT 21 - Final result   05/02/18 1456 ANIONGAP 7 Low Final result   05/02/18 1456 EGFRNONAA >60.0 - Final result   05/02/18 1456 ESTGFRAFRICA >60.0 - Final result   05/02/18 1456               Review of Systems   Constitutional: Positive for unexpected weight change. Negative for activity change.   HENT: Negative for hearing loss, rhinorrhea and trouble swallowing.    Eyes: Negative for discharge and visual disturbance.   Respiratory: Negative for chest tightness and wheezing.    Cardiovascular: Negative for chest pain and palpitations.   Gastrointestinal: Positive for constipation. Negative for blood in stool, diarrhea and vomiting.   Endocrine: Negative for polydipsia and polyuria.   Genitourinary: Positive for menstrual problem. Negative for difficulty urinating, dysuria and hematuria.   Musculoskeletal: Positive for arthralgias. Negative for joint swelling and neck pain.   Skin: Negative.    Neurological: Negative for weakness and headaches.   Psychiatric/Behavioral: Positive for dysphoric mood. Negative for confusion.       Objective:   /70 (BP Location: Right arm, Patient Position: Sitting, BP Method: Large (Manual))   Pulse 88   Resp 16   Ht 5' 11" (1.803 m)   Wt 111.4 kg (245 lb 11.2 oz)   SpO2 98%   BMI 34.27 kg/m²     Physical Exam   Constitutional: She is oriented to person, place, and time. She appears well-developed and well-nourished.   HENT:   Head: Normocephalic and atraumatic.   Right Ear: External ear normal.   Left Ear: External ear normal.   Nose: Nose normal.   Mouth/Throat: Oropharynx is clear and moist.   Eyes: Conjunctivae and EOM are normal. Pupils are equal, round, and reactive to light.   Neck: Normal range of motion. Neck supple.   Cardiovascular: Normal " rate, regular rhythm and normal heart sounds.    Pulmonary/Chest: Effort normal and breath sounds normal.   Abdominal: Soft. Bowel sounds are normal.   Musculoskeletal: Normal range of motion.   Neurological: She is alert and oriented to person, place, and time.   Skin: Skin is warm and dry.   Psychiatric: She has a normal mood and affect. Her behavior is normal. Judgment and thought content normal.   Nursing note and vitals reviewed.      Assessment:     1. History of DVT of lower extremity    2. History of pulmonary embolism    3. Primary insomnia    4. Female hirsutism    Indeed, so far no testing can give origin to complaints, but pt is going to see GYN.  Plan:     History of DVT of lower extremity    History of pulmonary embolism    Primary insomnia    Female hirsutism

## 2018-07-17 ENCOUNTER — OFFICE VISIT (OUTPATIENT)
Dept: OBSTETRICS AND GYNECOLOGY | Facility: CLINIC | Age: 33
End: 2018-07-17
Payer: COMMERCIAL

## 2018-07-17 VITALS
DIASTOLIC BLOOD PRESSURE: 76 MMHG | HEIGHT: 71 IN | BODY MASS INDEX: 34.68 KG/M2 | SYSTOLIC BLOOD PRESSURE: 110 MMHG | WEIGHT: 247.69 LBS

## 2018-07-17 DIAGNOSIS — N94.10 DYSPAREUNIA IN FEMALE: Primary | ICD-10-CM

## 2018-07-17 DIAGNOSIS — N94.6 DYSMENORRHEA: ICD-10-CM

## 2018-07-17 DIAGNOSIS — Z30.431 ENCOUNTER FOR ROUTINE CHECKING OF INTRAUTERINE CONTRACEPTIVE DEVICE (IUD): ICD-10-CM

## 2018-07-17 PROCEDURE — 99203 OFFICE O/P NEW LOW 30 MIN: CPT | Mod: S$GLB,,, | Performed by: OBSTETRICS & GYNECOLOGY

## 2018-07-17 PROCEDURE — 99999 PR PBB SHADOW E&M-EST. PATIENT-LVL III: CPT | Mod: PBBFAC,,, | Performed by: OBSTETRICS & GYNECOLOGY

## 2018-07-17 PROCEDURE — 3008F BODY MASS INDEX DOCD: CPT | Mod: CPTII,S$GLB,, | Performed by: OBSTETRICS & GYNECOLOGY

## 2018-07-19 NOTE — PROGRESS NOTES
Chief Complaint   Patient presents with    Gynecologic Exam       HPI:  33 y.o. female No obstetric history on file. presents as a new patient    Patient's last menstrual period was 07/16/2018.    - Paragard IUD in place  - Patient was previously told that her IUD may be low  - She also notes heavier cycles and dysmenorrhea since placement  - Also describes dyspareunia since placement    - Skipped 2 cycles over the past 6 months  - Wants to discuss possibility of PCOS and endometriosis  - Normal recent FSH, TSH, prolactin, testosterone, and A1c    Contraception: Paragard  Pap: No result found, No recent documented pap  Mammogram: N/A    Past Medical History:   Diagnosis Date    Allergy     seasonal    Blood clotting tendency     DVT (deep venous thrombosis) 2/11/2014    after surgery    Follicular adenoma of thyroid gland 2/6/2014    Keloid cicatrix     PE (pulmonary embolism) 2/11/2014    after surgery     Past Surgical History:   Procedure Laterality Date    THYROID LOBECTOMY Left 2/6/2014    and isthmusectomy    TONSILLECTOMY         Social History   Substance Use Topics    Smoking status: Never Smoker    Smokeless tobacco: Never Used    Alcohol use No     Family History   Problem Relation Age of Onset    Depression Mother     Diabetes Mother     Hypertension Mother     Hypertension Daughter     Heart attack Maternal Grandmother     Hypertension Maternal Grandmother     Cancer Brother     Heart disease Neg Hx     Anemia Neg Hx     Arrhythmia Neg Hx     Asthma Neg Hx     Clotting disorder Neg Hx     Fainting Neg Hx     Heart failure Neg Hx     Hyperlipidemia Neg Hx     Melanoma Neg Hx     Breast cancer Neg Hx     Ovarian cancer Neg Hx      OB History   No data available       MEDICATIONS: Reviewed with patient.  ALLERGIES: Percocet [oxycodone-acetaminophen]     ROS:  Review of Systems   Constitutional: Negative for fever.   Respiratory: Negative for shortness of breath.   "  Cardiovascular: Negative for chest pain.   Gastrointestinal: Negative for abdominal pain, nausea and vomiting.   Genitourinary: Positive for dyspareunia, menorrhagia and dysmenorrhea. Negative for dysuria and menstrual problem.   Neurological: Negative for headaches.       PHYSICAL EXAM:    /76   Ht 5' 11" (1.803 m)   Wt 112.4 kg (247 lb 11 oz)   LMP 07/16/2018   BMI 34.55 kg/m²     Physical Exam:   Constitutional: She is oriented to person, place, and time. She appears well-developed.    HENT:   Head: Normocephalic.       Pulmonary/Chest: Effort normal.                      Neurological: She is alert and oriented to person, place, and time.     Psychiatric: She has a normal mood and affect.         ASSESSMENT & PLAN:   Dyspareunia in female  -     US Pelvis Complete Non OB; Future; Expected date: 07/17/2018    Dysmenorrhea  -     US Pelvis Complete Non OB; Future; Expected date: 07/17/2018    Encounter for routine checking of intrauterine contraceptive device (IUD)  -     US Pelvis Complete Non OB; Future; Expected date: 07/17/2018          - Will schedule pelvic u/s to evaluate IUD position  - Counseled patient on alternative contraception including Mirena IUD  - History of DVT/PTE    - Counseled patient on PCOS as diagnosis of exclusion   - Features of PCOS including oligomenorrhea, insulin resistance, and hirsutism   - Management with hormonal contraception, metformin, and lifestyle changes  - Patient's current symptoms may not meet criteria for PCOS    Return to clinic 2 weeks after u/s for annual exam    Total visit time was 30 minutes with greater than 50% of time dedicated to counseling.      "

## 2018-07-25 ENCOUNTER — TELEPHONE (OUTPATIENT)
Dept: SLEEP MEDICINE | Facility: CLINIC | Age: 33
End: 2018-07-25

## 2018-07-25 DIAGNOSIS — G47.30 SLEEP APNEA, UNSPECIFIED TYPE: Primary | ICD-10-CM

## 2018-07-25 NOTE — TELEPHONE ENCOUNTER
Please inform there patient: clear indication for sleep apnea was noted on home study, however did not meet home sleep apnea criteria and can not be officially diagnosed, as a home study is not sensitive enough. In lab study is recommended for verification.    I will order in lab study for verification.    SLEEP LAB ([facility] or [facility]) will contact you to schedulethe sleep study. Their number is [phone] (ext 2). Please call them if you do not hear from them in 10 business days from now.  The [facility] Sleep Lab is located on 7th floor of the [facility]; [place] lab is located in [facility].    SLEEP CLINIC (my assistant) will call you when the sleep study results are ready - if you have not heard from us by 2 weeks from the date of the study, please call [phone] (ext 1) or you can use [facility] to contact me.    You are advised to abstain from driving should you feel sleepy or drowsy.

## 2018-07-26 ENCOUNTER — TELEPHONE (OUTPATIENT)
Dept: SLEEP MEDICINE | Facility: CLINIC | Age: 33
End: 2018-07-26

## 2018-07-26 NOTE — TELEPHONE ENCOUNTER
Left a detailed message for the patient to return call to the office in regards to her sleep study resultsL    Please inform there patient: clear indication for sleep apnea was noted on home study, however did not meet home sleep apnea criteria and can not be officially diagnosed, as a home study is not sensitive enough. In lab study is recommended for verification.     I will order in lab study for verification.     SLEEP LAB ([facility] or [facility]) will contact you to schedulethe sleep study. Their number is [phone] (ext 2). Please call them if you do not hear from them in 10 business days from now.  The [facility] Sleep Lab is located on 7th floor of the [facility]; [place] lab is located in [facility].     SLEEP CLINIC (my assistant) will call you when the sleep study results are ready - if you have not heard from us by 2 weeks from the date of the study, please call [phone] (ext 1) or you can use [facility] to contact me.     You are advised to abstain from driving should you feel sleepy or drowsy.    Please advise.

## 2018-07-26 NOTE — TELEPHONE ENCOUNTER
Spoke with the patient and advised of the sleep study results as well as the next steps in the process of her sleep management.

## 2018-07-26 NOTE — TELEPHONE ENCOUNTER
----- Message from Barbara Paredes sent at 7/26/2018  9:35 AM CDT -----  Contact: pt            Name of Who is Calling: pt      What is the request in detail: pt returned the nurse's phone call. Call pt      Can the clinic reply by [facility]: yes and [phone]      What Number to Call Back if not in [facility]: [phone]

## 2018-08-07 ENCOUNTER — TELEPHONE (OUTPATIENT)
Dept: SLEEP MEDICINE | Facility: OTHER | Age: 33
End: 2018-08-07

## 2018-08-07 ENCOUNTER — OFFICE VISIT (OUTPATIENT)
Dept: OBSTETRICS AND GYNECOLOGY | Facility: CLINIC | Age: 33
End: 2018-08-07
Payer: COMMERCIAL

## 2018-08-07 VITALS
WEIGHT: 251.63 LBS | SYSTOLIC BLOOD PRESSURE: 130 MMHG | HEIGHT: 71 IN | BODY MASS INDEX: 35.23 KG/M2 | DIASTOLIC BLOOD PRESSURE: 84 MMHG

## 2018-08-07 DIAGNOSIS — Z30.014 ENCOUNTER FOR INITIAL PRESCRIPTION OF INTRAUTERINE CONTRACEPTIVE DEVICE (IUD): ICD-10-CM

## 2018-08-07 DIAGNOSIS — T83.32XD DISPLACEMENT OF INTRAUTERINE CONTRACEPTIVE DEVICE, SUBSEQUENT ENCOUNTER: ICD-10-CM

## 2018-08-07 DIAGNOSIS — Z30.09 ENCOUNTER FOR OTHER GENERAL COUNSELING OR ADVICE ON CONTRACEPTION: ICD-10-CM

## 2018-08-07 DIAGNOSIS — N89.8 VAGINAL DISCHARGE: ICD-10-CM

## 2018-08-07 DIAGNOSIS — Z01.419 ENCOUNTER FOR GYNECOLOGICAL EXAMINATION (GENERAL) (ROUTINE) WITHOUT ABNORMAL FINDINGS: Primary | ICD-10-CM

## 2018-08-07 LAB
CANDIDA RRNA VAG QL PROBE: NEGATIVE
G VAGINALIS RRNA GENITAL QL PROBE: POSITIVE
T VAGINALIS RRNA GENITAL QL PROBE: NEGATIVE

## 2018-08-07 PROCEDURE — 87624 HPV HI-RISK TYP POOLED RSLT: CPT

## 2018-08-07 PROCEDURE — 99999 PR PBB SHADOW E&M-EST. PATIENT-LVL III: CPT | Mod: PBBFAC,,, | Performed by: OBSTETRICS & GYNECOLOGY

## 2018-08-07 PROCEDURE — 99395 PREV VISIT EST AGE 18-39: CPT | Mod: S$GLB,,, | Performed by: OBSTETRICS & GYNECOLOGY

## 2018-08-07 PROCEDURE — 87660 TRICHOMONAS VAGIN DIR PROBE: CPT

## 2018-08-07 PROCEDURE — 88175 CYTOPATH C/V AUTO FLUID REDO: CPT

## 2018-08-07 RX ORDER — ONDANSETRON 4 MG/1
4 TABLET, ORALLY DISINTEGRATING ORAL
COMMUNITY
End: 2018-08-07

## 2018-08-09 ENCOUNTER — PATIENT MESSAGE (OUTPATIENT)
Dept: OBSTETRICS AND GYNECOLOGY | Facility: CLINIC | Age: 33
End: 2018-08-09

## 2018-08-09 DIAGNOSIS — N76.0 ACUTE VAGINITIS: Primary | ICD-10-CM

## 2018-08-09 RX ORDER — METRONIDAZOLE 500 MG/1
500 TABLET ORAL EVERY 12 HOURS
Qty: 14 TABLET | Refills: 0 | Status: SHIPPED | OUTPATIENT
Start: 2018-08-09 | End: 2018-08-16

## 2018-08-11 ENCOUNTER — HOSPITAL ENCOUNTER (OUTPATIENT)
Dept: SLEEP MEDICINE | Facility: OTHER | Age: 33
Discharge: HOME | End: 2018-08-11
Attending: PSYCHIATRY & NEUROLOGY
Payer: COMMERCIAL

## 2018-08-11 DIAGNOSIS — G47.30 SLEEP APNEA, UNSPECIFIED TYPE: ICD-10-CM

## 2018-08-11 DIAGNOSIS — G47.33 OSA (OBSTRUCTIVE SLEEP APNEA): ICD-10-CM

## 2018-08-11 PROCEDURE — 95810 POLYSOM 6/> YRS 4/> PARAM: CPT

## 2018-08-12 NOTE — PROGRESS NOTES
"Baseline PSG was performed on Nevarez Panda. The following was explained to the pt either prior to or during the set-up procedure: the set-up procedure (what each wire is for), time to bed, time of waking in morning, reasons tech might be needing to enter room during the night, and the possibility of meeting criteria for a split-night study with CPAP mask. It was explained to the pt that the physician will analyze the study and the results will be sent to their PCP. A "Thank You" letter was also given to the pt upon leaving the lab that thoroughly explains the next steps in communication regarding this study and of treatment, if needed.     EKG: apparent Occasional PAC    Difficulties:  Averaged EEG for EKG artifact; TIR Check leg lead connections    Pt did not meet criteria for CPAP.  "

## 2018-08-13 LAB
HPV HR 12 DNA CVX QL NAA+PROBE: NEGATIVE
HPV16 AG SPEC QL: NEGATIVE
HPV18 DNA SPEC QL NAA+PROBE: NEGATIVE

## 2018-08-14 ENCOUNTER — TELEPHONE (OUTPATIENT)
Dept: PHARMACY | Facility: CLINIC | Age: 33
End: 2018-08-14

## 2018-08-17 ENCOUNTER — TELEPHONE (OUTPATIENT)
Dept: OBSTETRICS AND GYNECOLOGY | Facility: CLINIC | Age: 33
End: 2018-08-17

## 2018-08-17 NOTE — TELEPHONE ENCOUNTER
----- Message from Sreedhar Melendez sent at 8/17/2018  4:23 PM CDT -----  MARIN, PT RETURNING YOUR CALL [phone]

## 2018-08-19 NOTE — PROGRESS NOTES
"Chief Complaint   Patient presents with    Annual Exam    Vaginal Discharge    Vaginal Itching       HPI:  33 y.o. female  presents for a well woman exam    Patient's last menstrual period was 2018.    - History of abnormal paps: DENIES  - Abnormal bleeding: OCCASIONAL SKIPPED CYCLE  - Family history of breast or ovarian cancer: DENIES  - Any breast masses, pain, skin changes, or nipple discharge: DENIES    - 2018: Pelvic u/s -- "Intrauterine device is present which appears to be in the lower uterine cervical segment.  The IUD appears to be slightly tilted to the left."    - C/O VAGINAL DISCHARGE    Contraception: PARAGARD IUD  Pap: DONE TODAY    Past Medical History:   Diagnosis Date    Allergy     seasonal    Blood clotting tendency     DVT (deep venous thrombosis) 2014    after surgery    Follicular adenoma of thyroid gland 2014    Keloid cicatrix     PE (pulmonary embolism) 2014    after surgery     Past Surgical History:   Procedure Laterality Date    THYROID LOBECTOMY Left 2014    and isthmusectomy    TONSILLECTOMY         Social History     Tobacco Use    Smoking status: Never Smoker    Smokeless tobacco: Never Used   Substance Use Topics    Alcohol use: No     Family History   Problem Relation Age of Onset    Depression Mother     Diabetes Mother     Hypertension Mother     Hypertension Daughter     Heart attack Maternal Grandmother     Hypertension Maternal Grandmother     Cancer Brother     Heart disease Neg Hx     Anemia Neg Hx     Arrhythmia Neg Hx     Asthma Neg Hx     Clotting disorder Neg Hx     Fainting Neg Hx     Heart failure Neg Hx     Hyperlipidemia Neg Hx     Melanoma Neg Hx     Breast cancer Neg Hx     Ovarian cancer Neg Hx      OB History    Para Term  AB Living   0 0 0 0 0 0   SAB TAB Ectopic Multiple Live Births   0 0 0 0 0             MEDICATIONS: Reviewed with patient.  ALLERGIES: Percocet " "[oxycodone-acetaminophen]     ROS:  Review of Systems   Constitutional: Negative for fever.   Respiratory: Negative for shortness of breath.    Cardiovascular: Negative for chest pain.   Gastrointestinal: Negative for abdominal pain, nausea and vomiting.   Endocrine: Negative for hot flashes.   Genitourinary: Positive for dyspareunia, menorrhagia, vaginal discharge and dysmenorrhea.   Neurological: Negative for headaches.   Hematological: Does not bruise/bleed easily.   Psychiatric/Behavioral: Negative for depression.   Breast: Negative for breast mass, breast pain, nipple discharge and skin changes      PHYSICAL EXAM:    /84   Ht 5' 11" (1.803 m)   Wt 114.1 kg (251 lb 10.5 oz)   LMP 07/16/2018   BMI 35.10 kg/m²     Physical Exam:   Constitutional: She is oriented to person, place, and time. She appears well-developed.   No fever    HENT:   Head: Normocephalic.     Neck: No thyromegaly present.    Cardiovascular: Normal rate.     Pulmonary/Chest: Effort normal. Right breast exhibits no mass, no nipple discharge, no skin change, no tenderness and no swelling. Left breast exhibits no mass, no nipple discharge, no skin change, no tenderness and no swelling. Breasts are symmetrical.        Abdominal: She exhibits no mass. There is no hepatosplenomegaly. There is no tenderness.     Genitourinary: Uterus is not enlarged and not tender. Cervix is normal. Right adnexum displays no tenderness and no fullness. Left adnexum displays no tenderness and no fullness. Vaginal discharge found. Additional cervical findings: IUD strings visualized and pap smear done  Genitourinary Comments: External genitalia: Normal  Urethra: No tenderness; normal meatus  Bladder: No tenderness              Lymphadenopathy:     She has no cervical adenopathy.     She has no axillary adenopathy.    Neurological: She is alert and oriented to person, place, and time.     Psychiatric: She has a normal mood and affect.         ASSESSMENT & PLAN: "   Encounter for gynecological examination (general) (routine) without abnormal findings  -     HPV High Risk Genotypes, PCR  -     Liquid-based pap smear, screening    Vaginal discharge  -     Vaginosis Screen by DNA Probe    Encounter for initial prescription of intrauterine contraceptive device (IUD)  -     levonorgestrel (MIRENA) 20 mcg/24 hr (5 years) IUD; 1 Intra Uterine Device by Intrauterine route once. for 1 dose  Dispense: 1 Intra Uterine Device; Refill: 0    Encounter for other general counseling or advice on contraception    Displacement of intrauterine contraceptive device, subsequent encounter  -     levonorgestrel (MIRENA) 20 mcg/24 hr (5 years) IUD; 1 Intra Uterine Device by Intrauterine route once. for 1 dose  Dispense: 1 Intra Uterine Device; Refill: 0        - Breast and pelvic exam: NORMAL  - Patient was counseled on ASCCP guidelines for cervical cytology screening  - Cervical screening: PAP DONE TODAY    - Contraception: PARAGARD IUD LOW ON U/S.  PATIENT C/O DYSMENORRHEA AND DYSPAREUNIA WITH PARAGARD.  PATIENT DESIRES REMOVAL AND REPLACEMENT WITH MIRENA IUD.  REVIEWED CDC MEDICAL ELIGIBILITY CRITERIA, AND MIRENA IS ACCEPTABLE TO USE WITH PATIENT'S HISTORY OF DVT/PTE.  PRIOR AUTHORIZATION FILED TODAY.    - VAGINOSIS SCREEN

## 2018-08-23 ENCOUNTER — TELEPHONE (OUTPATIENT)
Dept: OBSTETRICS AND GYNECOLOGY | Facility: CLINIC | Age: 33
End: 2018-08-23

## 2018-08-23 NOTE — TELEPHONE ENCOUNTER
----- Message from Cony Cedeño sent at 8/22/2018  5:28 PM CDT -----  Thank you! It will be delivered 8/30/18.  Cony    ----- Message -----  From: Maggi Peralta MA  Sent: 8/21/2018   8:19 AM  To: Cony Cedeño    [address] [facility] [address]. Thanks   ----- Message -----  From: Cony Cedeño  Sent: 8/20/2018   5:51 PM  To: Chloe Keita, PharmD, Maggi Peralta MA, #    Hi Maggi,    I have been messaging Dr Malik and his Pool to try to confirm this patient's Mirena delivery. If I can just get the appropriate address and suite number if applicable, I can have it sent via  to your office.     I apologize if I was not routing the message correctly. I will be out of the office 8/21 but will be back 8/22. If you need immediate assistance you can call the Specialty Pharmacy and speak to either Chloe Webb, or Juliana.    Thank you,  Cony Cedeño  Patient Care Advocate  [facility]  Ph: [phone]

## 2018-08-27 ENCOUNTER — PATIENT MESSAGE (OUTPATIENT)
Dept: SLEEP MEDICINE | Facility: CLINIC | Age: 33
End: 2018-08-27

## 2018-09-02 ENCOUNTER — TELEPHONE (OUTPATIENT)
Dept: NEUROLOGY | Facility: CLINIC | Age: 33
End: 2018-09-02

## 2018-09-02 NOTE — TELEPHONE ENCOUNTER
Please schedule for the follow up with MD / NP  to review sleep study results.  Positive for significnat sleep apnea.    Thanks!

## 2018-09-04 ENCOUNTER — TELEPHONE (OUTPATIENT)
Dept: SLEEP MEDICINE | Facility: CLINIC | Age: 33
End: 2018-09-04

## 2018-09-04 NOTE — TELEPHONE ENCOUNTER
----- Message from Jasmina Machuca sent at 9/4/2018 11:32 AM CDT -----  Contact: PETER BETANCOURT [[id number]]            Name of Who is Calling: PETER BETANCOURT [[id number]]    What is the request in detail: Patient called to get he test results. Please call her.       Can the clinic reply by [facility]: no      What Number to Call Back if not in [facility]: [phone]

## 2018-09-04 NOTE — TELEPHONE ENCOUNTER
----- Message from Jasmina Machuca sent at 9/4/2018  3:57 PM CDT -----  Contact: PETER BETANCOURT [[id number]]            Name of Who is Calling: PETER BETANCOURT [[id number]]    What is the request in detail: Patient returned a call from the office. Please call her back       Can the clinic reply by [facility]: no       What Number to Call Back if not in [facility]: [phone]

## 2018-09-04 NOTE — TELEPHONE ENCOUNTER
Left message in regards to sleep study results. Asked that patient return the phone call at [phone] opt 1.    Please advise.

## 2018-09-05 ENCOUNTER — OFFICE VISIT (OUTPATIENT)
Dept: SLEEP MEDICINE | Facility: CLINIC | Age: 33
End: 2018-09-05
Payer: COMMERCIAL

## 2018-09-05 VITALS
BODY MASS INDEX: 35.14 KG/M2 | WEIGHT: 251 LBS | SYSTOLIC BLOOD PRESSURE: 112 MMHG | DIASTOLIC BLOOD PRESSURE: 75 MMHG | HEART RATE: 82 BPM | HEIGHT: 71 IN

## 2018-09-05 DIAGNOSIS — G47.33 OSA (OBSTRUCTIVE SLEEP APNEA): Primary | ICD-10-CM

## 2018-09-05 PROCEDURE — 99999 PR PBB SHADOW E&M-EST. PATIENT-LVL III: CPT | Mod: PBBFAC,,, | Performed by: NURSE PRACTITIONER

## 2018-09-05 PROCEDURE — 99214 OFFICE O/P EST MOD 30 MIN: CPT | Mod: S$GLB,,, | Performed by: NURSE PRACTITIONER

## 2018-09-05 PROCEDURE — 3008F BODY MASS INDEX DOCD: CPT | Mod: CPTII,S$GLB,, | Performed by: NURSE PRACTITIONER

## 2018-09-05 NOTE — PROGRESS NOTES
"Geri Pleasant seen in follow-up to discuss sleep study results. Last seen in clinic by Dr. Oakley 06/25/2018. This is her initial visit with me.     Discussed 08/11/2018 in-lab sleep study results in detail. Of note, pt also completed HST prior to PSG on 07/11/2018 which was inconclusive (AHI 2, RDI 16). Patient complaints of excessive daytime sleepiness, excessive daytime fatigue, snoring, and interrupted sleep remain the same. ESS 17.     Baseline Sleep Study: 08/11/2018  lb. The overall AHI was 9.1 with an oxygen emilie of 91.0%. The supine AHI was 8.5 and the REM AHI was 7.9.     Review of Systems:   Sleep related symptoms as per HPI.  Otherwise, a balance of 10 systems reviewed is negative.    Physical Exam:   /75 (BP Location: Right arm, Patient Position: Sitting, BP Method: Large (Automatic))   Pulse 82   Ht 5' 11" (1.803 m)   Wt 113.9 kg (251 lb)   BMI 35.01 kg/m²    GENERAL: Well groomed      Assessment:     Obstructive sleep apnea, mild by AHI criteria. The patient symptomatically has excessive daytime sleepiness, excessive daytime fatigue, snoring, and interrupted sleep. Medical co-mobidities: hx PE and obesity. This warrants treatment. Pt prefers initial treatment with OA/MAD.     Obesity, BMI > 35, discussed relationship between LAYNE and weight     Plan:     Education: During our discussion today, we talked about the etiology of obstructive sleep apnea as well as the potential ramifications of untreated sleep apnea, which could include daytime sleepiness, hypertension, heart disease and/or stroke. We discussed potential treatment options, which could include weight loss, body positioning, continuous positive airway pressure (CPAP), OA, EPAP, or referral for surgical consideration.     Treatment:  -Prefers to try OA/MAD by her dentist Dr. Garcia. Provided with OA Rx.   -Sleep study not necessarily indicated to confirm resolution, given AHI was mild severity if symptoms resolved with " OA usage. However, discussed that in order to technically measure efficacy of OA, repeat testing preferably by PSG necessary.   -Counseling regarding benefits of healthy eating and physical activity (150 minutes of moderate-intensity aerobic activity per week) for weight reduction which can improve overall health.     Precautions: The patient was advised to abstain from driving should they feel sleepy  or drowsy.

## 2018-09-10 ENCOUNTER — TELEPHONE (OUTPATIENT)
Dept: OBSTETRICS AND GYNECOLOGY | Facility: CLINIC | Age: 33
End: 2018-09-10

## 2018-09-11 ENCOUNTER — OFFICE VISIT (OUTPATIENT)
Dept: OBSTETRICS AND GYNECOLOGY | Facility: CLINIC | Age: 33
End: 2018-09-11
Payer: COMMERCIAL

## 2018-09-11 VITALS
SYSTOLIC BLOOD PRESSURE: 120 MMHG | BODY MASS INDEX: 36.43 KG/M2 | DIASTOLIC BLOOD PRESSURE: 70 MMHG | HEIGHT: 70 IN | WEIGHT: 254.44 LBS

## 2018-09-11 DIAGNOSIS — Z30.433 ENCOUNTER FOR REMOVAL AND REINSERTION OF INTRAUTERINE CONTRACEPTIVE DEVICE (IUD): Primary | ICD-10-CM

## 2018-09-11 PROCEDURE — 99999 PR PBB SHADOW E&M-EST. PATIENT-LVL III: CPT | Mod: PBBFAC,,, | Performed by: OBSTETRICS & GYNECOLOGY

## 2018-09-11 PROCEDURE — 99499 UNLISTED E&M SERVICE: CPT | Mod: S$GLB,,, | Performed by: OBSTETRICS & GYNECOLOGY

## 2018-09-11 PROCEDURE — 58300 INSERT INTRAUTERINE DEVICE: CPT | Mod: S$GLB,,, | Performed by: OBSTETRICS & GYNECOLOGY

## 2018-09-11 PROCEDURE — 58301 REMOVE INTRAUTERINE DEVICE: CPT | Mod: 51,S$GLB,, | Performed by: OBSTETRICS & GYNECOLOGY

## 2018-09-11 NOTE — PROCEDURES
INSERTION OF INTRAUTERINE DEVICE  Date/Time: 2018 4:38 PM  Performed by: DALJIT Malik MD  Authorized by: DALJIT Malik MD   Local anesthesia used: no    Anesthesia:  Local anesthesia used: no    Sedation:  Patient sedated: no    Patient tolerance: Patient tolerated the procedure well with no immediate complications    Removal of Intrauterine Device  Date/Time: 2018 4:38 PM  Performed by: DALJIT Malik MD  Authorized by: DALJIT Malik MD   Local anesthesia used: no    Anesthesia:  Local anesthesia used: no    Sedation:  Patient sedated: no    Patient tolerance: Patient tolerated the procedure well with no immediate complications        REASON FOR VISIT  IUD insertion    33 y.o.  presents for IUD placement.    Patient's last menstrual period was 08/15/2018 (exact date)..    UPT is negative.    Pap: 8/10/2018, NILM/HPV(-)     CONSENT COUNSELING  She was counseled on the risks, benefits, indications, and alternatives to IUD use.  She understands that with insertion there is a risk of bleeding, infection, and uterine perforation.  She acknowledges risks and wishes to proceed.  All questions were answered.  Consents signed.    PROCEDURE  Time out performed.  Uterine flexion confirmed on bimanual exam.  The cervix was visualized with a speculum.  IUD strings visualized with speculum.  Strings grasped with ring forceps, and Paragard IUD removed intact with gentle traction.  The cervix was prepped with povidine iodine.  A single tooth tenaculum was placed on the anterior lip of the cervix.  The uterus was sounded with an endometrial pipelle.  The IUD was loaded in the 's provided device, inserted, and placed high in the uterine fundus without difficulty.  Strings were cut, and the tenaculum and speculum removed.  Hemostasis noted.  The patient tolerated the procedure well.    Uterine flexion  Midplane  Cavity length  10-cm  Device   Mirena  Lot #   [id number]  Expiration  date 3/2021      ASSESSMENT  Encounter for removal and reinsertion of intrauterine contraceptive device (IUD)  -     INSERTION OF INTRAUTERINE DEVICE  -     Removal of Intrauterine Device        POST PLACEMENT COUNSELING  Manage post IUD placement pain with NSAIDs orTylenol.  IUD danger signs and how to check for strings were discussed.    Removal date  9/11/2023    Follow up:    4 weeks.    IUD REMOVAL    Patient counseled on risks of procedure including pain or bleeding.  Patient acknowledges risks and wishes to proceed.  All questions answered.    IUD strings visualized with speculum.  Strings grasped with ring forceps, and IUD removed intact with gentle traction.  Hemostasis noted.  Procedure tolerated well.

## 2018-09-12 ENCOUNTER — TELEPHONE (OUTPATIENT)
Dept: SLEEP MEDICINE | Facility: CLINIC | Age: 33
End: 2018-09-12

## 2018-09-18 ENCOUNTER — PATIENT MESSAGE (OUTPATIENT)
Dept: DERMATOLOGY | Facility: CLINIC | Age: 33
End: 2018-09-18

## 2018-10-04 ENCOUNTER — OFFICE VISIT (OUTPATIENT)
Dept: DERMATOLOGY | Facility: CLINIC | Age: 33
End: 2018-10-04
Payer: COMMERCIAL

## 2018-10-04 DIAGNOSIS — L98.9 DISEASE OF SKIN AND SUBCUTANEOUS TISSUE: Primary | ICD-10-CM

## 2018-10-04 PROCEDURE — 99213 OFFICE O/P EST LOW 20 MIN: CPT | Mod: S$GLB,,, | Performed by: NURSE PRACTITIONER

## 2018-10-04 PROCEDURE — 99999 PR PBB SHADOW E&M-EST. PATIENT-LVL III: CPT | Mod: PBBFAC,,, | Performed by: NURSE PRACTITIONER

## 2018-10-04 RX ORDER — PIMECROLIMUS 10 MG/G
CREAM TOPICAL
Qty: 60 G | Refills: 2 | Status: SHIPPED | OUTPATIENT
Start: 2018-10-04 | End: 2019-08-13

## 2018-10-04 RX ORDER — METHYLPREDNISOLONE 4 MG/1
TABLET ORAL
COMMUNITY
Start: 2018-10-03 | End: 2018-10-09 | Stop reason: ALTCHOICE

## 2018-10-04 RX ORDER — TRIAMCINOLONE ACETONIDE 1 MG/G
CREAM TOPICAL
Qty: 454 G | Refills: 3 | Status: SHIPPED | OUTPATIENT
Start: 2018-10-04 | End: 2019-08-13

## 2018-10-04 NOTE — PROGRESS NOTES
Subjective:       Patient ID:  Geri Mosqueda is a 33 y.o. 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 other. (Previously followed by Dr. Lockwood, then Dr. Solis)  Symptom course: worsening (initally presented to me 5/28/18; no treatment started 2/2 wanted to see previous biopsy (x2) reports. No information was received.)  Currently using: denies any current treatment. Denies using sunscreen, reports leaves white film over. Denies following Nickel free diet.   Affected locations: face, right lower leg and left lower leg  Signs / symptoms: peeling and spreading  Severity: mild to moderate      Previously seen/followed by Dr. Randi Carter (3/2017), biopsy done, negative for fungus. Then, referred to Dr. Lamas- biopsy also done and patch testing. Recommended nickel free diet; rx topicals given- pt unsure of name?      Review of Systems   Constitutional: Positive for weight gain and fatigue. Negative for fever, chills, weight loss and night sweats.   Genitourinary: Positive for irregular periods.   Skin: Positive for activity-related sunscreen use. Negative for dry skin and daily sunscreen use.   Hematologic/Lymphatic: Does not bruise/bleed easily.        Objective:    Physical Exam   Constitutional: She appears well-developed and well-nourished. No distress.   Neurological: She is alert and oriented to person, place, and time. She is not disoriented.   Psychiatric: She has a normal mood and affect.   Skin:   Areas Examined (abnormalities noted in diagram):   Head / Face Inspection Performed  Neck Inspection Performed  Chest / Axilla Inspection Performed  Back Inspection Performed  RUE Inspected  LUE Inspection Performed  RLE Inspected  LLE Inspection Performed                   Diagram  Legend     Erythematous scaling macule/papule c/w actinic keratosis       Vascular papule c/w angioma      Pigmented verrucoid papule/plaque c/w seborrheic keratosis      Yellow umbilicated papule c/w sebaceous hyperplasia      Irregularly shaped tan macule c/w lentigo     1-2 mm smooth white papules consistent with Milia      Movable subcutaneous cyst with punctum c/w epidermal inclusion cyst      Subcutaneous movable cyst c/w pilar cyst      Firm pink to brown papule c/w dermatofibroma      Pedunculated fleshy papule(s) c/w skin tag(s)      Evenly pigmented macule c/w junctional nevus     Mildly variegated pigmented, slightly irregular-bordered macule c/w mildly atypical nevus      Flesh colored to evenly pigmented papule c/w intradermal nevus       Pink pearly papule/plaque c/w basal cell carcinoma      Erythematous hyperkeratotic cursted plaque c/w SCC      Surgical scar with no sign of skin cancer recurrence      Open and closed comedones      Inflammatory papules and pustules      Verrucoid papule consistent consistent with wart     Erythematous eczematous patches and plaques     Dystrophic onycholytic nail with subungual debris c/w onychomycosis     Umbilicated papule    Erythematous-base heme-crusted tan verrucoid plaque consistent with inflamed seborrheic keratosis     Erythematous Silvery Scaling Plaque c/w Psoriasis     See annotation              Previous photos from 5/28/18                    Assessment / Plan:        Disease of skin and subcutaneous tissue  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 other  -     triamcinolone acetonide 0.1% (KENALOG) 0.1 % cream; AAA bid  Dispense: 454 g; Refill: 3  -     pimecrolimus (ELIDEL) 1 % cream; AAA face daily  Dispense: 60 g; Refill: 2    Release of information forms were signed again today and sent to Dr. Lamas & Dr. Lockwood office.    Recommend re-starting Nickel free diet, suggested follow up with Nutritionist to help  with diet regimen.    Would like to avoid 3rd biopsy if possible.    Pt needs to follow up with staff Dermatologist.            Follow-up if symptoms worsen or fail to improve.

## 2018-10-09 ENCOUNTER — OFFICE VISIT (OUTPATIENT)
Dept: INTERNAL MEDICINE | Facility: CLINIC | Age: 33
End: 2018-10-09
Payer: COMMERCIAL

## 2018-10-09 VITALS
RESPIRATION RATE: 16 BRPM | BODY MASS INDEX: 35.76 KG/M2 | WEIGHT: 249.81 LBS | HEART RATE: 82 BPM | SYSTOLIC BLOOD PRESSURE: 115 MMHG | DIASTOLIC BLOOD PRESSURE: 80 MMHG | HEIGHT: 70 IN

## 2018-10-09 DIAGNOSIS — Z56.9 PROBLEMS AT WORK: Primary | ICD-10-CM

## 2018-10-09 PROCEDURE — 3008F BODY MASS INDEX DOCD: CPT | Mod: CPTII,S$GLB,, | Performed by: INTERNAL MEDICINE

## 2018-10-09 PROCEDURE — 99999 PR PBB SHADOW E&M-EST. PATIENT-LVL III: CPT | Mod: PBBFAC,,, | Performed by: INTERNAL MEDICINE

## 2018-10-09 PROCEDURE — 99212 OFFICE O/P EST SF 10 MIN: CPT | Mod: S$GLB,,, | Performed by: INTERNAL MEDICINE

## 2018-10-09 SDOH — SOCIAL DETERMINANTS OF HEALTH (SDOH): UNSPECIFIED PROBLEMS RELATED TO EMPLOYMENT: Z56.9

## 2018-10-09 NOTE — PROGRESS NOTES
"Subjective:      Patient ID: Geri Mosqueda is a 33 y.o. female.    Chief Complaint: No chief complaint on file.    HPI: 33 y.o. 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  Apparently she has TMJ, is awaiting a bite block, and getting PT on her masseter muscles.  After this, she will be able to do her sleep apnea study, so she can be fitted for her mask.  Per pt, this is the plan from her dentist and sleep doctor.  The timing of all of this, is dependent on them and the pt.  I have referred the [facility] papers to them.      Review of Systems   Constitutional: Negative for activity change.   Eyes: Negative for discharge.   Respiratory: Negative for wheezing.    Cardiovascular: Negative for chest pain and palpitations.   Gastrointestinal: Negative for constipation, diarrhea and vomiting.   Genitourinary: Negative for difficulty urinating and hematuria.   Neurological: Negative for headaches.   Psychiatric/Behavioral: Negative for dysphoric mood.       Objective:   /80 (BP Location: Left arm, Patient Position: Sitting, BP Method: Large (Automatic))   Pulse 82   Resp 16   Ht 5' 10" (1.778 m)   Wt 113.3 kg (249 lb 12.8 oz)   LMP 09/25/2018   BMI 35.84 kg/m²     Physical Exam   Constitutional: She is oriented to person, place, and time. She appears well-developed and well-nourished.   Neurological: She is alert and oriented to person, place, and time.   Skin: Skin is warm and dry.   Psychiatric: She has a normal mood and affect. Her behavior is normal. Judgment and thought content normal.   Nursing note and vitals reviewed.      Assessment:     1. Problems at work      Plan:     Problems at work        "

## 2018-10-10 ENCOUNTER — OFFICE VISIT (OUTPATIENT)
Dept: OBSTETRICS AND GYNECOLOGY | Facility: CLINIC | Age: 33
End: 2018-10-10
Payer: COMMERCIAL

## 2018-10-10 ENCOUNTER — PATIENT MESSAGE (OUTPATIENT)
Dept: SLEEP MEDICINE | Facility: CLINIC | Age: 33
End: 2018-10-10

## 2018-10-10 VITALS
SYSTOLIC BLOOD PRESSURE: 128 MMHG | BODY MASS INDEX: 35.92 KG/M2 | DIASTOLIC BLOOD PRESSURE: 70 MMHG | WEIGHT: 250.88 LBS | HEIGHT: 70 IN

## 2018-10-10 DIAGNOSIS — Z30.431 ENCOUNTER FOR ROUTINE CHECKING OF INTRAUTERINE CONTRACEPTIVE DEVICE (IUD): Primary | ICD-10-CM

## 2018-10-10 PROCEDURE — 99213 OFFICE O/P EST LOW 20 MIN: CPT | Mod: S$GLB,,, | Performed by: OBSTETRICS & GYNECOLOGY

## 2018-10-10 PROCEDURE — 3008F BODY MASS INDEX DOCD: CPT | Mod: CPTII,S$GLB,, | Performed by: OBSTETRICS & GYNECOLOGY

## 2018-10-10 PROCEDURE — 99999 PR PBB SHADOW E&M-EST. PATIENT-LVL III: CPT | Mod: PBBFAC,,, | Performed by: OBSTETRICS & GYNECOLOGY

## 2018-10-10 NOTE — PROGRESS NOTES
History & Physical  Gynecology      SUBJECTIVE:     Chief Complaint: string check       History of Present Illness:  Ms. Mosqueda is a 33 yr old female who presents for IUD surveillance. She had a mirena IUD placed approximately 1 month ago and presents for string check. No complaints. Last cycle on 18. Minimal cramping.    Review of patient's allergies indicates:   Allergen Reactions    Percocet [oxycodone-acetaminophen]        Past Medical History:   Diagnosis Date    Allergy     seasonal    Blood clotting tendency     DVT (deep venous thrombosis) 2014    after surgery    Follicular adenoma of thyroid gland 2014    Keloid cicatrix     PE (pulmonary embolism) 2014    after surgery     Past Surgical History:   Procedure Laterality Date    THYROID LOBECTOMY Left 2014    and isthmusectomy    THYROIDECTOMY N/A 2014    Performed by [facility]vajal MD at [facility] OR    TONSILLECTOMY       OB History      Para Term  AB Living    0 0 0 0 0 0    SAB TAB Ectopic Multiple Live Births    0 0 0 0 0        Family History   Problem Relation Age of Onset    Depression Mother     Diabetes Mother     Hypertension Mother     Hypertension Daughter     Heart attack Maternal Grandmother     Hypertension Maternal Grandmother     Cancer Brother     Heart disease Neg Hx     Anemia Neg Hx     Arrhythmia Neg Hx     Asthma Neg Hx     Clotting disorder Neg Hx     Fainting Neg Hx     Heart failure Neg Hx     Hyperlipidemia Neg Hx     Melanoma Neg Hx     Breast cancer Neg Hx     Ovarian cancer Neg Hx      Social History     Tobacco Use    Smoking status: Never Smoker    Smokeless tobacco: Never Used   Substance Use Topics    Alcohol use: No    Drug use: No       Current Outpatient Medications   Medication Sig    DULoxetine (CYMBALTA) 30 MG capsule Take 2 capsules (60 mg total) by mouth once daily.    multivitamin capsule Take 1 capsule by mouth once daily.     pimecrolimus (ELIDEL) 1 % cream AAA face daily    triamcinolone acetonide 0.1% (KENALOG) 0.1 % cream AAA bid    levonorgestrel (MIRENA) 20 mcg/24 hr (5 years) IUD 1 Intra Uterine Device by Intrauterine route once. for 1 dose     No current facility-administered medications for this visit.          Review of Systems:  Review of Systems   Constitutional: Negative for activity change, fatigue, fever and unexpected weight change.   Respiratory: Negative for cough and shortness of breath.    Cardiovascular: Negative for chest pain and palpitations.   Gastrointestinal: Negative for abdominal pain, constipation, diarrhea and nausea.   Endocrine: Negative for hot flashes.   Genitourinary: Negative for dyspareunia, dysuria, menorrhagia, menstrual problem, pelvic pain and vaginal discharge.   Musculoskeletal: Negative for back pain.   Neurological: Negative for headaches.   Psychiatric/Behavioral: The patient is not nervous/anxious.    Breast: Negative for nipple discharge       OBJECTIVE:     Physical Exam:  Physical Exam   Constitutional: She is oriented to person, place, and time. She appears well-developed and well-nourished.   HENT:   Head: Normocephalic and atraumatic.   Neck: Normal range of motion. Neck supple.   Cardiovascular: Normal rate, regular rhythm, normal heart sounds and intact distal pulses.   Pulmonary/Chest: Effort normal and breath sounds normal.   Abdominal: Soft. Bowel sounds are normal. There is no tenderness. There is no guarding.   Genitourinary: There is no rash, tenderness, lesion or injury on the right labia. There is no rash, tenderness, lesion or injury on the left labia. No erythema, tenderness or bleeding in the vagina. No foreign body in the vagina. No signs of injury around the vagina. No vaginal discharge found.   Genitourinary Comments: Strings visualized at external os   Neurological: She is alert and oriented to person, place, and time.   Skin: Skin is warm and dry.   Psychiatric: She  has a normal mood and affect.   Vitals reviewed.        ASSESSMENT:       ICD-10-CM ICD-9-CM    1. Encounter for routine checking of intrauterine contraceptive device (IUD) Z30.431 V25.42           Plan:      Strings visualized at external os. No complaints  RTC for annual or PRN     Counseling time: 15 minutes    Daquan Finley

## 2018-10-30 ENCOUNTER — PATIENT MESSAGE (OUTPATIENT)
Dept: SLEEP MEDICINE | Facility: CLINIC | Age: 33
End: 2018-10-30

## 2018-10-30 DIAGNOSIS — M41.9 SCOLIOSIS, UNSPECIFIED SCOLIOSIS TYPE, UNSPECIFIED SPINAL REGION: ICD-10-CM

## 2018-10-30 RX ORDER — DULOXETIN HYDROCHLORIDE 30 MG/1
CAPSULE, DELAYED RELEASE ORAL
Qty: 180 CAPSULE | Refills: 0 | Status: SHIPPED | OUTPATIENT
Start: 2018-10-30 | End: 2019-03-25 | Stop reason: SDUPTHER

## 2018-11-28 ENCOUNTER — PATIENT MESSAGE (OUTPATIENT)
Dept: SLEEP MEDICINE | Facility: CLINIC | Age: 33
End: 2018-11-28

## 2018-11-29 ENCOUNTER — PATIENT MESSAGE (OUTPATIENT)
Dept: INTERNAL MEDICINE | Facility: CLINIC | Age: 33
End: 2018-11-29

## 2018-11-29 DIAGNOSIS — S82.51XA CLOSED AVULSION FRACTURE OF MEDIAL MALLEOLUS OF RIGHT TIBIA, INITIAL ENCOUNTER: Primary | ICD-10-CM

## 2018-12-03 ENCOUNTER — INITIAL CONSULT (OUTPATIENT)
Dept: PODIATRY | Facility: CLINIC | Age: 33
End: 2018-12-03
Payer: COMMERCIAL

## 2018-12-03 VITALS
HEART RATE: 96 BPM | WEIGHT: 246 LBS | HEIGHT: 70 IN | RESPIRATION RATE: 16 BRPM | SYSTOLIC BLOOD PRESSURE: 116 MMHG | BODY MASS INDEX: 35.22 KG/M2 | DIASTOLIC BLOOD PRESSURE: 80 MMHG

## 2018-12-03 DIAGNOSIS — S82.54XA NONDISPLACED FRACTURE OF MEDIAL MALLEOLUS OF RIGHT TIBIA, INITIAL ENCOUNTER FOR CLOSED FRACTURE: Primary | ICD-10-CM

## 2018-12-03 PROCEDURE — 99999 PR PBB SHADOW E&M-EST. PATIENT-LVL III: CPT | Mod: PBBFAC,,, | Performed by: PODIATRIST

## 2018-12-03 PROCEDURE — 3008F BODY MASS INDEX DOCD: CPT | Mod: CPTII,S$GLB,, | Performed by: PODIATRIST

## 2018-12-03 PROCEDURE — 99203 OFFICE O/P NEW LOW 30 MIN: CPT | Mod: S$GLB,,, | Performed by: PODIATRIST

## 2018-12-03 NOTE — PROGRESS NOTES
"Subjective:      Patient ID: Geri Mosqueda is a 33 y.o. female.    Chief Complaint: Ankle Pain (post ER visit)    33 years old female presenting after sustaining right ankle fracture.  It happened on last Wednesday.  She stepped a curb and fell.  She denies any other injury that right ankle.  Pain level was 9/10 but now 6/10 she is taking Norco at home and also Toradol at her work.  She has been nonweightbearing with posterior splint.    Review of Systems   Constitution: Negative for decreased appetite, fever, weakness and malaise/fatigue.   HENT: Negative for congestion.    Cardiovascular: Negative for chest pain and leg swelling.   Respiratory: Negative for cough and shortness of breath.    Skin: Negative for color change, nail changes and rash.   Musculoskeletal: Positive for joint pain and joint swelling. Negative for arthritis and muscle weakness.   Gastrointestinal: Negative for bloating, abdominal pain, nausea and vomiting.   Neurological: Negative for headaches and numbness.   Psychiatric/Behavioral: Negative for altered mental status.     Vitals:    12/03/18 1543   BP: 116/80   Pulse: 96   Resp: 16   Weight: 111.6 kg (246 lb)   Height: 5' 10" (1.778 m)   PainSc:   6   PainLoc: Ankle             Objective:      Physical Exam   Constitutional: She is oriented to person, place, and time. She appears well-developed and well-nourished. No distress.   Musculoskeletal: She exhibits edema and tenderness. She exhibits no deformity.   Neurological: She is alert and oriented to person, place, and time.   Skin: Skin is warm and dry. Capillary refill takes less than 2 seconds. No erythema.   Psychiatric: She has a normal mood and affect. Her behavior is normal.     Vascular: Distal DP/PT pulses palpable 2/4 b/l. CRT < 3 sec to tips of toes. No vericosities noted to b/l LEs. Hair growth present b/l LE, warm to touch b/l LE  R ankle: mild edema medially    Dermatologic: No open lesions, lacerations or wounds. " Interdigital spaces clean, dry and intact b/l. No erythema, rubor, calor noted to b/l LE    Musculoskeletal: Equinus noted b/l ankles with < 10 deg DF noted b/l. MMT 5/5. No calf tenderness b/l LE, Compartments soft/compressible b/l.   R ankle:  Pain at distal tip of medial malleolus.  No pain at the lateral malleolus, 5th met base, peroneals, proximal tib-fib, midfoot.     Neurological: Light touch, proprioception, and sharp/dull sensation are all intact b/l. Protective threshold with the [place]-Wienstein monofilament is intact b/l. Vibratory sensation intact b/l.         Assessment:       Encounter Diagnosis   Name Primary?    Nondisplaced fracture of medial malleolus of right tibia, initial encounter for closed fracture - Right Foot Yes         Plan:       Geri was seen today for ankle pain.    Diagnoses and all orders for this visit:    Nondisplaced fracture of medial malleolus of right tibia, initial encounter for closed fracture - Right Foot      I counseled the patient on her conditions, their implications and medical management.    -33 years old female with right medial male closed nondisplaced chip fracture.    -xrays were reviewed with the patient. Based on radiographic finding she will be treated with conservative treatments with the immobilization in CAM for 4-6 weeks  -WBAT CAM with crutches as prn  -c/w pain med  -f/u 4 weeks  -The nature of the condition, options for management, as well as potential risks and complications were discussed in detail with patient. Patient was amenable to my recommendations and left my office fully informed and will follow up as instructed or sooner if necessary.

## 2018-12-03 NOTE — LETTER
December 3, 2018      Bethany Mckinney MD  [address]  [address]   [facility] [address]           [facility]  [address], [address]   [facility] [address]  Phone: [phone]  Fax: [phone]          Patient: Geri Mosqueda   MR Number: [id number]   YOB: 1985   Date of Visit: 12/3/2018       Dear Dr. Bethany Mckinney:    Thank you for referring Geri Mosqueda to me for evaluation. Attached you will find relevant portions of my assessment and plan of care.    If you have questions, please do not hesitate to call me. I look forward to following Geri Mosqueda along with you.    Sincerely,    Melva Rodriges, DELMY    Enclosure  CC:  No Recipients    If you would like to receive this communication electronically, please contact [email or web address] or [phone] to request more information on [employer] Link access.    For providers and/or their staff who would like to refer a patient to [facility], please contact us through our one-stop-shop provider referral line, [facility], at [phone].    If you feel you have received this communication in error or would no longer like to receive these types of communications, please e-mail [email or web address]

## 2018-12-10 ENCOUNTER — PATIENT MESSAGE (OUTPATIENT)
Dept: SLEEP MEDICINE | Facility: CLINIC | Age: 33
End: 2018-12-10

## 2018-12-14 ENCOUNTER — PATIENT MESSAGE (OUTPATIENT)
Dept: PODIATRY | Facility: CLINIC | Age: 33
End: 2018-12-14

## 2018-12-17 ENCOUNTER — PATIENT MESSAGE (OUTPATIENT)
Dept: SLEEP MEDICINE | Facility: CLINIC | Age: 33
End: 2018-12-17

## 2019-01-03 ENCOUNTER — OFFICE VISIT (OUTPATIENT)
Dept: PODIATRY | Facility: CLINIC | Age: 34
End: 2019-01-03
Payer: COMMERCIAL

## 2019-01-03 VITALS
DIASTOLIC BLOOD PRESSURE: 75 MMHG | HEIGHT: 70 IN | HEART RATE: 78 BPM | WEIGHT: 245 LBS | SYSTOLIC BLOOD PRESSURE: 117 MMHG | BODY MASS INDEX: 35.07 KG/M2

## 2019-01-03 DIAGNOSIS — S82.891D CLOSED FRACTURE OF RIGHT ANKLE WITH ROUTINE HEALING, SUBSEQUENT ENCOUNTER: Primary | ICD-10-CM

## 2019-01-03 PROCEDURE — 3008F PR BODY MASS INDEX (BMI) DOCUMENTED: ICD-10-PCS | Mod: CPTII,S$GLB,, | Performed by: PODIATRIST

## 2019-01-03 PROCEDURE — 99999 PR PBB SHADOW E&M-EST. PATIENT-LVL III: CPT | Mod: PBBFAC,,, | Performed by: PODIATRIST

## 2019-01-03 PROCEDURE — 99213 PR OFFICE/OUTPT VISIT, EST, LEVL III, 20-29 MIN: ICD-10-PCS | Mod: S$GLB,,, | Performed by: PODIATRIST

## 2019-01-03 PROCEDURE — 3008F BODY MASS INDEX DOCD: CPT | Mod: CPTII,S$GLB,, | Performed by: PODIATRIST

## 2019-01-03 PROCEDURE — 99999 PR PBB SHADOW E&M-EST. PATIENT-LVL III: ICD-10-PCS | Mod: PBBFAC,,, | Performed by: PODIATRIST

## 2019-01-03 PROCEDURE — 99213 OFFICE O/P EST LOW 20 MIN: CPT | Mod: S$GLB,,, | Performed by: PODIATRIST

## 2019-01-03 NOTE — PROGRESS NOTES
Subjective:      Patient ID: Geri Mosqueda is a 33 y.o. female.    Chief Complaint: No chief complaint on file.    {POD HPI BLOCKS:55557}    ROS        Objective:      Physical Exam          Assessment:       No diagnosis found.      Plan:       There are no diagnoses linked to this encounter.  I counseled the patient on her conditions, their implications and medical management.        {PROCEDURES:93371}

## 2019-01-03 NOTE — PROGRESS NOTES
"Subjective:      Patient ID: Geri Mosqueda is a 33 y.o. female.    Chief Complaint: f/u right ankle fracture     33 years old female presenting after sustaining right ankle fracture.  It happened on last Wednesday.  She stepped a curb and fell.  She denies any other injury that right ankle.  Pain level was 9/10 but now 6/10 she is taking Norco at home and also Toradol at her work.  She has been nonweightbearing with posterior splint.    1/3/19:  Following up for right ankle fracture.  She has been weight-bearing as tolerated in the boot.  She says pain overall reduced.  She is doing pretty well. She has no pain upon passive range of motion.     Review of Systems   Constitution: Negative for decreased appetite, fever, weakness and malaise/fatigue.   HENT: Negative for congestion.    Cardiovascular: Negative for chest pain and leg swelling.   Respiratory: Negative for cough and shortness of breath.    Skin: Negative for color change, nail changes and rash.   Musculoskeletal: Positive for joint pain. Negative for arthritis, joint swelling and muscle weakness.   Gastrointestinal: Negative for bloating, abdominal pain, nausea and vomiting.   Neurological: Negative for headaches and numbness.   Psychiatric/Behavioral: Negative for altered mental status.     Vitals:    01/03/19 1537   BP: 117/75   Pulse: 78   Weight: 111.1 kg (245 lb)   Height: 5' 10" (1.778 m)   PainSc:   3   PainLoc: Foot             Objective:      Physical Exam   Constitutional: She is oriented to person, place, and time. She appears well-developed and well-nourished. No distress.   Musculoskeletal: She exhibits tenderness. She exhibits no edema or deformity.   Neurological: She is alert and oriented to person, place, and time.   Skin: Skin is warm and dry. Capillary refill takes less than 2 seconds. No erythema.   Psychiatric: She has a normal mood and affect. Her behavior is normal.     Vascular: Distal DP/PT pulses palpable 2/4 b/l. CRT < 3 sec " to tips of toes. No vericosities noted to b/l LEs. Hair growth present b/l LE, warm to touch b/l LE  R ankle: no edema     Dermatologic: No open lesions, lacerations or wounds. Interdigital spaces clean, dry and intact b/l. No erythema, rubor, calor noted to b/l LE    Musculoskeletal: Equinus noted b/l ankles with < 10 deg DF noted b/l. MMT 5/5. No calf tenderness b/l LE, Compartments soft/compressible b/l.   R ankle:  Mild tenderness at distal tip of medial malleolus.  No pain at the lateral malleolus, 5th met base, peroneals, proximal tib-fib, midfoot.     Neurological: Light touch, proprioception, and sharp/dull sensation are all intact b/l. Protective threshold with the [place]-Wienstein monofilament is intact b/l. Vibratory sensation intact b/l.         Assessment:       Encounter Diagnosis   Name Primary?    Closed fracture of right ankle with routine healing, subsequent encounter Yes         Plan:       Diagnoses and all orders for this visit:    Closed fracture of right ankle with routine healing, subsequent encounter  -     Ambulatory Referral to Physical/Occupational Therapy      I counseled the patient on her conditions, their implications and medical management.    -33 years old female with right medial mal closed nondisplaced chip fracture.    -Rx. Physical therapy   -WBAT in normal sneaker with ankle brace. Ankle brace dispensed to patient  -c/w pain med  -f/u prn  -The nature of the condition, options for management, as well as potential risks and complications were discussed in detail with patient. Patient was amenable to my recommendations and left my office fully informed and will follow up as instructed or sooner if necessary.

## 2019-03-22 ENCOUNTER — PATIENT MESSAGE (OUTPATIENT)
Dept: INTERNAL MEDICINE | Facility: CLINIC | Age: 34
End: 2019-03-22

## 2019-03-22 RX ORDER — CEPHALEXIN 500 MG/1
CAPSULE ORAL
Status: ON HOLD | COMMUNITY
Start: 2019-03-04 | End: 2019-07-30 | Stop reason: CLARIF

## 2019-03-22 RX ORDER — HYDROCODONE BITARTRATE AND ACETAMINOPHEN 7.5; 325 MG/1; MG/1
TABLET ORAL
COMMUNITY
Start: 2019-03-04 | End: 2019-08-13

## 2019-03-22 NOTE — TELEPHONE ENCOUNTER
Pt's calling to see if she should get a colonoscopy because her maternal grandmother was diagnosed with colon cancer. Pt is concerned. I explained that given the response of the physician will determine if she'll need an appointment and who, if any, she will be scheduled to see. Pt verbalized understanding.

## 2019-03-25 DIAGNOSIS — M41.9 SCOLIOSIS, UNSPECIFIED SCOLIOSIS TYPE, UNSPECIFIED SPINAL REGION: ICD-10-CM

## 2019-03-25 RX ORDER — DULOXETIN HYDROCHLORIDE 30 MG/1
60 CAPSULE, DELAYED RELEASE ORAL DAILY
Qty: 180 CAPSULE | Refills: 0 | Status: SHIPPED | OUTPATIENT
Start: 2019-03-25 | End: 2019-07-22 | Stop reason: SDUPTHER

## 2019-04-02 DIAGNOSIS — Z80.0 FAMILY HISTORY OF COLON CANCER REQUIRING SCREENING COLONOSCOPY: Primary | ICD-10-CM

## 2019-05-22 ENCOUNTER — TELEPHONE (OUTPATIENT)
Dept: GASTROENTEROLOGY | Facility: CLINIC | Age: 34
End: 2019-05-22

## 2019-05-23 ENCOUNTER — TELEPHONE (OUTPATIENT)
Dept: GASTROENTEROLOGY | Facility: CLINIC | Age: 34
End: 2019-05-23

## 2019-06-05 ENCOUNTER — TELEPHONE (OUTPATIENT)
Dept: GASTROENTEROLOGY | Facility: CLINIC | Age: 34
End: 2019-06-05

## 2019-06-07 ENCOUNTER — TELEPHONE (OUTPATIENT)
Dept: GASTROENTEROLOGY | Facility: CLINIC | Age: 34
End: 2019-06-07

## 2019-06-13 ENCOUNTER — TELEPHONE (OUTPATIENT)
Dept: GASTROENTEROLOGY | Facility: CLINIC | Age: 34
End: 2019-06-13

## 2019-06-13 RX ORDER — SODIUM, POTASSIUM,MAG SULFATES 17.5-3.13G
1 SOLUTION, RECONSTITUTED, ORAL ORAL DAILY
Qty: 354 ML | Refills: 0 | Status: SHIPPED | OUTPATIENT
Start: 2019-06-13 | End: 2019-06-15

## 2019-06-13 NOTE — TELEPHONE ENCOUNTER
SUPREP Instructions    You are scheduled for a colonoscopy with Dr. Carcamo on 7/9/2019 at [facility]  To ensure that your test is accurate and complete, you MUST follow these instructions listed below.  If you have any questions, please call our office at [phone].  Plan on being at the hospital for your procedure for 3-4 hours.    1.  Follow a CLEAR LIQUID DIET for the entire day before your scheduled colonoscopy.  This means no solid food the entire day starting when you wake.  You may have as much of the clear liquids as you want throughout the day.   CLEAR LIQUID DIET:   - Avoid Red, Orange, Purple, and/or Blue food coloring   - NO DAIRY   - You can have:  Coffee with sugar (no creamer), tea, water, soda, apple or white grape juice, chicken or beef broth/bouillon (no meat, noodles, or veggies), green/yellow popsicles, green/yellow Jell-O, lemonade.    2.  AT 5 pm the evening before your colonoscopy, POUR ONE (1) BOTTLE OF SUPREP INTO THE MIXING CONTAINER, PROVIDED INSIDE THE BOX.  ADD WATER TO THE LINE ON THE CONTAINER AND MIX IT WELL.  DRINK THE ENTIRE CONTAINER AND THEN DRINK TWO (2) MORE CONTAINERS OF WATER OVER THE NEXT 1 HOUR.  This is sometimes easier to drink if this solution is cold, so you can mix the solution a few hours ahead of time and place in the refrigerator prior to drinking.  You have to drink the solution within 24 hours of mixing it.  Do NOT put this solution over ice.  It IS ok to drink with a straw.    3.  The endoscopy department will call you 2 days before your colonoscopy to tell you the exact time to arrive, AND to tell you the exact time to drink the 2nd portion of your prep (which will be FIVE HOURS BEFORE YOUR ARRIVAL TIME).  At this time given to you, POUR ONE (1) BOTTLE OF SUPREP INTO THE MIXING CONTAINER, PROVIDED INSIDE THE BOX.  ADD WATER TO THE LINE ON THE CONTAINER AND MIX IT WELL.  DRINK THE ENTIRE CONTAINER AND THEN DRINK TWO (2) MORE CONTAINERS OF WATER OVER THE  NEXT 1 HOUR.  This is sometimes easier to drink if this solution is cold, so you can mix the solution a few hours ahead of time and place in the refrigerator prior to drinking.  You have to drink the solution within 24 hours of mixing it.  Do NOT put this solution over ice.  It IS ok to drink with a straw. Once this is complete, you may not have ANYTHING else by mouth!    4.  You must have someone with you to DRIVE YOU HOME since you will be receiving IV sedation for the colonoscopy.    5.  It is ok to take your heart, blood pressure, and seizure medications in the morning of your test with a SIP of water.  Hold other medications until after your procedure.  Do NOT have anything else to eat or drink the morning of your colonoscopy.  It is ok to brush your teeth.    6.  If you are on blood thinners THAT YOU HAVE BEEN INSTRUCTED TO HOLD BY YOUR DOCTOR FOR THIS PROCEDURE, then do NOT take this the morning of your colonoscopy.  Do NOT stop these medications on your own, they must be approved to be held by your doctor.  Your colonoscopy can NOT be done if you are on these medications.  Examples of blood thinners include: Coumadin, Aggrenox, Plavix, Pradaxa, Reapro, Pletal, Xarelto, Ticagrelor, Brilinta, Eliquis, and high dose aspirin (325 mg).  You do not have to stop baby aspirin 81 mg.    7.  IF YOU ARE DIABETIC:  NO INSULIN OR ORAL MEDICATIONS THE MORNING OF THE COLONOSCOPY.  TAKE ONLY HALF THE DOSE OF YOUR INSULIN THE DAY BEFORE THE COLONOSCOPY.  DO NOT TAKE ANY ORAL DIABETIC MEDICATIONS THE DAY BEFORE THE COLONOSCOPY.  IF YOU ARE AN INSULIN DEPENDENT DIABETIC WITH UNSTABLE BLOOD SUGARDS, NOTIFY YOUR PRIMARY CARE PHYSICIAN FOR INSTRUCTIONS.

## 2019-06-13 NOTE — TELEPHONE ENCOUNTER
Colonoscopy Referral   Referring Physician: Dr. Mckinney  Date: 7/9/2019  Reason for Referral: Colon Screening   Family History of: Colon Cancer  Colon polyp:  None  Relationship/Age of Onset: None  Colon cancer: YES  Relationship/Age of Onset: Maternal Grandmother   Hemoccults Done:  NO  Iron deficient: NO  On Blood Thinner: NO  Valvular heart disease/valve replacement: NO   Anemia Present: NO  On NSAID: NO  Lung disease: NO  Kidney disease: NO  Hx of polyps:NO  Hx of colon cancer: NO  Previous colon evalations: NO      When:   Where:   Pertinent symptoms:     Patient was scheduled for colonoscopy on 7/9/2019 with Dr. Carcamo at [facility]. Suprep instructions were reviewed with patient.

## 2019-07-22 DIAGNOSIS — M41.9 SCOLIOSIS, UNSPECIFIED SCOLIOSIS TYPE, UNSPECIFIED SPINAL REGION: ICD-10-CM

## 2019-07-22 RX ORDER — DULOXETIN HYDROCHLORIDE 30 MG/1
60 CAPSULE, DELAYED RELEASE ORAL DAILY
Qty: 180 CAPSULE | Refills: 0 | Status: SHIPPED | OUTPATIENT
Start: 2019-07-22 | End: 2019-08-06 | Stop reason: SDUPTHER

## 2019-07-23 ENCOUNTER — TELEPHONE (OUTPATIENT)
Dept: GASTROENTEROLOGY | Facility: CLINIC | Age: 34
End: 2019-07-23

## 2019-07-23 NOTE — TELEPHONE ENCOUNTER
----- Message from Arlin Ellington sent at 7/23/2019  9:20 AM CDT -----  Contact: [phone]/self  Type:  Needs Medical Advice    Who Called:  self  Symptoms (please be specific):     How long has patient had these symptoms:     Pharmacy name and phone #:   [id number]  [place], [place] - [address] AT [facility]  Would the patient rather a call back or a response via [facility]?  Best Call Back Number:   Additional Information: Requesting a rx for PREP be sent to her pharmacy.

## 2019-07-26 ENCOUNTER — TELEPHONE (OUTPATIENT)
Dept: ENDOSCOPY | Facility: HOSPITAL | Age: 34
End: 2019-07-26

## 2019-07-26 NOTE — TELEPHONE ENCOUNTER
Spoke with patient about arrival time @. 1115    Prep instructions reviewed: the day before the procedure, follow a clear liquid diet all day, then start the first 1/2 of prep at 5pm and take 2nd 1/2 of prep @. 0515 Pt must be completely NPO when prep completed @. 0715             Medications: Do not take Insulin or oral diabetic medications the day of the procedure.  Take as prescribed: heart, seizure and blood pressure medication in the morning with a sip of water (less than an ounce).  Take any breathing medications and bring inhalers to hospital with you Leave all valuables and jewelry at home.     Wear comfortable clothes to procedure to change into hospital gown You cannot drive for 24 hours after your procedure because you will receive sedation for your procedure to make you comfortable.  A ride must be provided at discharge.

## 2019-07-30 ENCOUNTER — HOSPITAL ENCOUNTER (OUTPATIENT)
Facility: HOSPITAL | Age: 34
Discharge: HOME | End: 2019-07-30
Attending: INTERNAL MEDICINE | Admitting: INTERNAL MEDICINE
Payer: COMMERCIAL

## 2019-07-30 ENCOUNTER — ANESTHESIA (OUTPATIENT)
Dept: ENDOSCOPY | Facility: HOSPITAL | Age: 34
End: 2019-07-30
Payer: COMMERCIAL

## 2019-07-30 ENCOUNTER — ANESTHESIA EVENT (OUTPATIENT)
Dept: ENDOSCOPY | Facility: HOSPITAL | Age: 34
End: 2019-07-30
Payer: COMMERCIAL

## 2019-07-30 VITALS
OXYGEN SATURATION: 99 % | BODY MASS INDEX: 35 KG/M2 | TEMPERATURE: 99 F | HEIGHT: 71 IN | DIASTOLIC BLOOD PRESSURE: 76 MMHG | HEART RATE: 76 BPM | SYSTOLIC BLOOD PRESSURE: 128 MMHG | WEIGHT: 250 LBS | RESPIRATION RATE: 18 BRPM

## 2019-07-30 DIAGNOSIS — Z12.11 SCREENING FOR MALIGNANT NEOPLASM OF COLON: ICD-10-CM

## 2019-07-30 LAB
B-HCG UR QL: NEGATIVE
CTP QC/QA: YES

## 2019-07-30 PROCEDURE — 88305 TISSUE SPECIMEN TO PATHOLOGY - SURGERY: ICD-10-PCS | Mod: 26,,, | Performed by: PATHOLOGY

## 2019-07-30 PROCEDURE — 45385 PR COLONOSCOPY,REMV LESN,SNARE: ICD-10-PCS | Mod: 33,,, | Performed by: INTERNAL MEDICINE

## 2019-07-30 PROCEDURE — 45385 COLONOSCOPY W/LESION REMOVAL: CPT | Performed by: INTERNAL MEDICINE

## 2019-07-30 PROCEDURE — 27201089 HC SNARE, DISP (ANY): Performed by: INTERNAL MEDICINE

## 2019-07-30 PROCEDURE — 37000008 HC ANESTHESIA 1ST 15 MINUTES: Performed by: INTERNAL MEDICINE

## 2019-07-30 PROCEDURE — 63600175 PHARM REV CODE 636 W HCPCS: Performed by: NURSE ANESTHETIST, CERTIFIED REGISTERED

## 2019-07-30 PROCEDURE — 45385 COLONOSCOPY W/LESION REMOVAL: CPT | Mod: 33,,, | Performed by: INTERNAL MEDICINE

## 2019-07-30 PROCEDURE — 88305 TISSUE EXAM BY PATHOLOGIST: CPT | Performed by: PATHOLOGY

## 2019-07-30 PROCEDURE — 88305 TISSUE EXAM BY PATHOLOGIST: CPT | Mod: 26,,, | Performed by: PATHOLOGY

## 2019-07-30 PROCEDURE — 37000009 HC ANESTHESIA EA ADD 15 MINS: Performed by: INTERNAL MEDICINE

## 2019-07-30 RX ORDER — SODIUM CHLORIDE 0.9 % (FLUSH) 0.9 %
10 SYRINGE (ML) INJECTION
Status: DISCONTINUED | OUTPATIENT
Start: 2019-07-30 | End: 2019-07-30 | Stop reason: HOSPADM

## 2019-07-30 RX ORDER — PROPOFOL 10 MG/ML
VIAL (ML) INTRAVENOUS CONTINUOUS PRN
Status: DISCONTINUED | OUTPATIENT
Start: 2019-07-30 | End: 2019-07-30

## 2019-07-30 RX ORDER — SODIUM CHLORIDE 9 MG/ML
INJECTION, SOLUTION INTRAVENOUS CONTINUOUS PRN
Status: DISCONTINUED | OUTPATIENT
Start: 2019-07-30 | End: 2019-07-30

## 2019-07-30 RX ORDER — LIDOCAINE HCL/PF 100 MG/5ML
SYRINGE (ML) INTRAVENOUS
Status: DISCONTINUED | OUTPATIENT
Start: 2019-07-30 | End: 2019-07-30

## 2019-07-30 RX ORDER — PROPOFOL 10 MG/ML
VIAL (ML) INTRAVENOUS
Status: DISCONTINUED | OUTPATIENT
Start: 2019-07-30 | End: 2019-07-30

## 2019-07-30 RX ORDER — SODIUM CHLORIDE 9 MG/ML
INJECTION, SOLUTION INTRAVENOUS CONTINUOUS
Status: DISCONTINUED | OUTPATIENT
Start: 2019-07-30 | End: 2019-07-30 | Stop reason: HOSPADM

## 2019-07-30 RX ADMIN — PROPOFOL 70 MG: 10 INJECTION, EMULSION INTRAVENOUS at 12:07

## 2019-07-30 RX ADMIN — PROPOFOL 150 MCG/KG/MIN: 10 INJECTION, EMULSION INTRAVENOUS at 12:07

## 2019-07-30 RX ADMIN — SODIUM CHLORIDE: 0.9 INJECTION, SOLUTION INTRAVENOUS at 11:07

## 2019-07-30 RX ADMIN — LIDOCAINE HYDROCHLORIDE 100 MG: 20 INJECTION, SOLUTION INTRAVENOUS at 12:07

## 2019-07-30 NOTE — ANESTHESIA PREPROCEDURE EVALUATION
07/30/2019  Geri Mosqueda is a 34 y.o., female for colonoscopy under MAC    Past Medical History:   Diagnosis Date    Allergy     seasonal    Blood clotting tendency     DVT (deep venous thrombosis) 2/11/2014    after surgery    Follicular adenoma of thyroid gland 2/6/2014    Keloid cicatrix     PE (pulmonary embolism) 2/11/2014    after surgery     Past Surgical History:   Procedure Laterality Date    THYROID LOBECTOMY Left 2/6/2014    and isthmusectomy    THYROIDECTOMY N/A 2/6/2014    Performed by [facility] at [facility] OR    TONSILLECTOMY           Anesthesia Evaluation    I have reviewed the Patient Summary Reports.    I have reviewed the Nursing Notes.   I have reviewed the Medications.     Review of Systems  Social:  Non-Smoker        Physical Exam  General:  Well nourished, Obesity    Airway/Jaw/Neck:  Airway Findings: Mallampati: II      Chest/Lungs:  Chest/Lungs Clear    Heart/Vascular:  Heart Findings: Normal            Anesthesia Plan  Type of Anesthesia, risks & benefits discussed:  Anesthesia Type:  MAC  Patient's Preference:   Intra-op Monitoring Plan:   Intra-op Monitoring Plan Comments:   Post Op Pain Control Plan:   Post Op Pain Control Plan Comments:   Induction:    Beta Blocker:  Patient is not currently on a Beta-Blocker (No further documentation required).       Informed Consent: Patient understands risks and agrees with Anesthesia plan.  Questions answered. Anesthesia consent signed with patient.  ASA Score: 2     Day of Surgery Review of History & Physical:            Ready For Surgery From Anesthesia Perspective.

## 2019-07-30 NOTE — TRANSFER OF CARE
"Anesthesia Transfer of Care Note    Patient: Geri Mosqueda    Procedure(s) Performed: Procedure(s) (LRB):  COLONOSCOPY/Suprep (N/A)    Patient location: GI    Anesthesia Type: MAC    Transport from OR: Transported from OR on room air with adequate spontaneous ventilation    Post pain: adequate analgesia    Post assessment: no apparent anesthetic complications and tolerated procedure well    Post vital signs: stable    Level of consciousness: awake, alert and oriented    Nausea/Vomiting: no nausea/vomiting    Complications: none    Transfer of care protocol was followed      Last vitals:   Visit Vitals  /65 (Patient Position: Lying)   Pulse 79   Temp 37 °C (98.6 °F) (Temporal)   Resp 18   Ht 5' 11" (1.803 m)   Wt 113.4 kg (250 lb)   LMP 06/01/2019   SpO2 99%   Breastfeeding? No   BMI 34.87 kg/m²     "

## 2019-07-30 NOTE — ANESTHESIA POSTPROCEDURE EVALUATION
Anesthesia Post Evaluation    Patient: Geri Mosqueda    Procedure(s) Performed: Procedure(s) (LRB):  COLONOSCOPY/Suprep (N/A)    Final Anesthesia Type: MAC  Patient location during evaluation: GI PACU  Patient participation: Yes- Able to Participate  Level of consciousness: awake and alert and oriented  Post-procedure vital signs: reviewed and stable  Pain management: adequate  Airway patency: patent  PONV status at discharge: No PONV  Anesthetic complications: no      Cardiovascular status: blood pressure returned to baseline, hemodynamically stable and stable  Respiratory status: unassisted, spontaneous ventilation and room air  Hydration status: euvolemic  Follow-up not needed.          Vitals Value Taken Time   /65 7/30/2019 11:32 AM   Temp 37 °C (98.6 °F) 7/30/2019 11:32 AM   Pulse 79 7/30/2019 11:32 AM   Resp 18 7/30/2019 11:32 AM   SpO2 99 % 7/30/2019 11:32 AM         No case tracking events are documented in the log.      Pain/Marshall Score: No data recorded

## 2019-07-30 NOTE — H&P
[facility]  Gastroenterology  H&P    Patient Name: Geri Mosqueda  MRN: [id number]  Admission Date: 7/30/2019  Code Status: No Order    Attending Provider: Emre Carcamo MD   Primary Care Physician: Bethany Mckinney MD  Principal Problem:<principal problem not specified>    Subjective:     History of Present Illness: Colon cancer screening, family history of colon cancer    Past Medical History:   Diagnosis Date    Allergy     seasonal    Blood clotting tendency     DVT (deep venous thrombosis) 2/11/2014    after surgery    Follicular adenoma of thyroid gland 2/6/2014    Keloid cicatrix     PE (pulmonary embolism) 2/11/2014    after surgery       Past Surgical History:   Procedure Laterality Date    THYROID LOBECTOMY Left 2/6/2014    and isthmusectomy    THYROIDECTOMY N/A 2/6/2014    Performed [facility] at [facility] OR    TONSILLECTOMY         Review of patient's allergies indicates:   Allergen Reactions    Percocet [oxycodone-acetaminophen]      Family History     Problem Relation (Age of Onset)    Cancer Brother    Depression Mother    Diabetes Mother    Heart attack Maternal Grandmother    Hypertension Mother, Daughter, Maternal Grandmother        Tobacco Use    Smoking status: Never Smoker    Smokeless tobacco: Never Used   Substance and Sexual Activity    Alcohol use: No    Drug use: No    Sexual activity: Yes     Partners: Male     Birth control/protection: IUD     Review of Systems   Constitutional: Negative for appetite change and unexpected weight change.   Respiratory: Negative for apnea and chest tightness.    Cardiovascular: Negative for chest pain and palpitations.   Gastrointestinal: Negative for abdominal distention and abdominal pain.     Objective:     Vital Signs (Most Recent):    Vital Signs (24h Range):        Weight: 113.4 kg (250 lb) (07/30/19 1127)  Body mass index is 34.87 kg/m².    No intake or output data in the 24 hours ending  07/30/19 1133    Lines/Drains/Airways          None          Physical Exam   Constitutional: She is oriented to person, place, and time. She appears well-developed and well-nourished. No distress.   HENT:   Head: Normocephalic and atraumatic.   Eyes: Conjunctivae are normal. No scleral icterus.   Neck: Normal range of motion. Neck supple. No tracheal deviation present. No thyromegaly present.   Cardiovascular: Normal rate and regular rhythm. Exam reveals no gallop and no friction rub.   No murmur heard.  Pulmonary/Chest: Effort normal and breath sounds normal. No respiratory distress. She has no wheezes.   Abdominal: Soft. Bowel sounds are normal. She exhibits no distension. There is no tenderness.   Musculoskeletal:        Right wrist: She exhibits normal range of motion and no tenderness.        Left wrist: She exhibits normal range of motion and no tenderness.   Lymphadenopathy:        Head (right side): No submental and no submandibular adenopathy present.        Head (left side): No submental and no submandibular adenopathy present.   Neurological: She is alert and oriented to person, place, and time.   Skin: Skin is warm and dry. No rash noted. She is not diaphoretic. No erythema.   Psychiatric: She has a normal mood and affect. Her behavior is normal.   Nursing note and vitals reviewed.        Assessment/Plan:   - Colon cancer screening    Plan  1. Colonoscopy    Emre Carcamo MD  Gastroenterology  [facility]

## 2019-07-30 NOTE — [employer]OVATION PATIENT INSTRUCTIONS
Discharge Summary/Instructions after an Endoscopic Procedure  Patient Name: Geri Mosqueda  Patient MRN: [id number]  Patient YOB: 1985 Tuesday, July 30, 2019  Emre Carcamo MD  RESTRICTIONS:  During your procedure today, you received medications for sedation.  These   medications may affect your judgment, balance and coordination.  Therefore,   for 24 hours, you have the following restrictions:   - DO NOT drive a car, operate machinery, make legal/financial decisions,   sign important papers or drink alcohol.    ACTIVITY:  Today: no heavy lifting, straining or running due to procedural   sedation/anesthesia.  The following day: return to full activity including work.  DIET:  Eat and drink normally unless instructed otherwise.     TREATMENT FOR COMMON SIDE EFFECTS:  - Mild abdominal pain, nausea, belching, bloating or excessive gas:  rest,   eat lightly and use a heating pad.  - Sore Throat: treat with throat lozenges and/or gargle with warm salt   water.  - Because air was used during the procedure, expelling large amounts of air   from your rectum or belching is normal.  - If a bowel prep was taken, you may not have a bowel movement for 1-3 days.    This is normal.  SYMPTOMS TO WATCH FOR AND REPORT TO YOUR PHYSICIAN:  1. Abdominal pain or bloating, other than gas cramps.  2. Chest pain.  3. Back pain.  4. Signs of infection such as: chills or fever occurring within 24 hours   after the procedure.  5. Rectal bleeding, which would show as bright red, maroon, or black stools.   (A tablespoon of blood from the rectum is not serious, especially if   hemorrhoids are present.)  6. Vomiting.  7. Weakness or dizziness.  GO DIRECTLY TO THE NEAREST EMERGENCY ROOM IF YOU HAVE ANY OF THE FOLLOWING:      Difficulty breathing              Chills and/or fever over 101 F   Persistent vomiting and/or vomiting blood   Severe abdominal pain   Severe chest pain   Black, tarry stools   Bleeding- more than one  tablespoon   Any other symptom or condition that you feel may need urgent attention  Your doctor recommends these additional instructions:  If any biopsies were taken, your doctors clinic will contact you in 1 to 2   weeks with any results.  - Written discharge instructions were provided to the patient.   - Patient has a contact number available for emergencies.  The signs and   symptoms of potential delayed complications were discussed with the   patient.  Return to normal activities tomorrow.  Written discharge   instructions were provided to the patient.   - Resume previous diet.   - Continue present medications.   - Await pathology results.   - Repeat colonoscopy in 5 years for surveillance.   - Return to GI office PRN.   - Discharge patient to home.  For questions, problems or results please call your physician - Emre Carcamo MD at Work:  [phone].  EMERGENCY PHONE NUMBER: [phone],  LAB RESULTS: [phone]  IF A COMPLICATION OR EMERGENCY SITUATION ARISES AND YOU ARE UNABLE TO REACH   YOUR PHYSICIAN - GO DIRECTLY TO THE EMERGENCY ROOM.  Emre Carcamo MD  7/30/2019 12:47:36 PM  This report has been verified and signed electronically.  [employer]

## 2019-07-31 ENCOUNTER — PATIENT MESSAGE (OUTPATIENT)
Dept: INTERNAL MEDICINE | Facility: CLINIC | Age: 34
End: 2019-07-31

## 2019-07-31 DIAGNOSIS — M41.9 SCOLIOSIS, UNSPECIFIED SCOLIOSIS TYPE, UNSPECIFIED SPINAL REGION: ICD-10-CM

## 2019-07-31 RX ORDER — DULOXETIN HYDROCHLORIDE 30 MG/1
60 CAPSULE, DELAYED RELEASE ORAL DAILY
Qty: 180 CAPSULE | Refills: 0 | OUTPATIENT
Start: 2019-07-31

## 2019-07-31 NOTE — TELEPHONE ENCOUNTER
Left a message informing pt that she will have to be seen before scheduling an appointment to get her medication.

## 2019-08-06 DIAGNOSIS — M41.9 SCOLIOSIS, UNSPECIFIED SCOLIOSIS TYPE, UNSPECIFIED SPINAL REGION: ICD-10-CM

## 2019-08-06 RX ORDER — DULOXETIN HYDROCHLORIDE 30 MG/1
60 CAPSULE, DELAYED RELEASE ORAL DAILY
Qty: 180 CAPSULE | Refills: 0 | Status: SHIPPED | OUTPATIENT
Start: 2019-08-06 | End: 2019-08-13 | Stop reason: SDUPTHER

## 2019-08-08 ENCOUNTER — TELEPHONE (OUTPATIENT)
Dept: GASTROENTEROLOGY | Facility: CLINIC | Age: 34
End: 2019-08-08

## 2019-08-08 NOTE — TELEPHONE ENCOUNTER
----- Message from Emre Carcamo MD sent at 8/7/2019  7:07 PM CDT -----  Benign colon polyp; 5 year recall colonoscopy

## 2019-08-13 ENCOUNTER — OFFICE VISIT (OUTPATIENT)
Dept: INTERNAL MEDICINE | Facility: CLINIC | Age: 34
End: 2019-08-13
Payer: COMMERCIAL

## 2019-08-13 VITALS
OXYGEN SATURATION: 98 % | BODY MASS INDEX: 36.28 KG/M2 | HEIGHT: 71 IN | TEMPERATURE: 99 F | WEIGHT: 259.13 LBS | SYSTOLIC BLOOD PRESSURE: 124 MMHG | HEART RATE: 89 BPM | DIASTOLIC BLOOD PRESSURE: 80 MMHG | RESPIRATION RATE: 18 BRPM

## 2019-08-13 DIAGNOSIS — M41.9 SCOLIOSIS, UNSPECIFIED SCOLIOSIS TYPE, UNSPECIFIED SPINAL REGION: ICD-10-CM

## 2019-08-13 PROCEDURE — 99999 PR PBB SHADOW E&M-EST. PATIENT-LVL III: ICD-10-PCS | Mod: PBBFAC,,, | Performed by: INTERNAL MEDICINE

## 2019-08-13 PROCEDURE — 3008F PR BODY MASS INDEX (BMI) DOCUMENTED: ICD-10-PCS | Mod: CPTII,S$GLB,, | Performed by: INTERNAL MEDICINE

## 2019-08-13 PROCEDURE — 99213 PR OFFICE/OUTPT VISIT, EST, LEVL III, 20-29 MIN: ICD-10-PCS | Mod: S$GLB,,, | Performed by: INTERNAL MEDICINE

## 2019-08-13 PROCEDURE — 3008F BODY MASS INDEX DOCD: CPT | Mod: CPTII,S$GLB,, | Performed by: INTERNAL MEDICINE

## 2019-08-13 PROCEDURE — 99999 PR PBB SHADOW E&M-EST. PATIENT-LVL III: CPT | Mod: PBBFAC,,, | Performed by: INTERNAL MEDICINE

## 2019-08-13 PROCEDURE — 99213 OFFICE O/P EST LOW 20 MIN: CPT | Mod: S$GLB,,, | Performed by: INTERNAL MEDICINE

## 2019-08-13 RX ORDER — DULOXETIN HYDROCHLORIDE 30 MG/1
60 CAPSULE, DELAYED RELEASE ORAL DAILY
Qty: 180 CAPSULE | Refills: 3 | Status: SHIPPED | OUTPATIENT
Start: 2019-08-13 | End: 2019-08-13 | Stop reason: SDUPTHER

## 2019-08-13 RX ORDER — DULOXETIN HYDROCHLORIDE 30 MG/1
60 CAPSULE, DELAYED RELEASE ORAL DAILY
Qty: 180 CAPSULE | Refills: 3 | Status: SHIPPED | OUTPATIENT
Start: 2019-08-13 | End: 2022-11-18 | Stop reason: SDUPTHER

## 2019-08-23 NOTE — PROGRESS NOTES
INTERNAL MEDICINE    Patient Active Problem List   Diagnosis    History of pulmonary embolism    Primary insomnia    Obesity (BMI 30-39.9)    Family history of diabetes mellitus in mother    Anxiety    Chronic pain of left knee    Acute pain of left knee    Impaired mobility    History of DVT of lower extremity    ALYNE (obstructive sleep apnea)    Screening for malignant neoplasm of colon       CC:   Chief Complaint   Patient presents with    Medication Refill       SUBJECTIVE:  Geri Mosqueda   is a 34 y.o. female  HPI   34y/oAAF here because she has severe pain in her back.  She has scoliosis, and a chronic back pain as a result.  Otherwise, she is doing well.  No /GI disruption.    ROS: Review of Systems   Constitutional: Negative for activity change and unexpected weight change.   HENT: Negative for hearing loss, rhinorrhea and trouble swallowing.    Eyes: Negative for discharge and visual disturbance.   Respiratory: Negative for chest tightness and wheezing.    Cardiovascular: Negative for chest pain and palpitations.   Gastrointestinal: Negative for blood in stool, constipation, diarrhea and vomiting.   Endocrine: Negative for polydipsia and polyuria.   Genitourinary: Negative for difficulty urinating, dysuria, hematuria and menstrual problem.   Musculoskeletal: Positive for arthralgias. Negative for joint swelling and neck pain.   Neurological: Negative for weakness and headaches.   Psychiatric/Behavioral: Negative for confusion and dysphoric mood.       Past Medical History:   Diagnosis Date    Allergy     seasonal    Blood clotting tendency     DVT (deep venous thrombosis) 2/11/2014    after surgery    Follicular adenoma of thyroid gland 2/6/2014    Keloid cicatrix     PE (pulmonary embolism) 2/11/2014    after surgery       Past Surgical History:   Procedure Laterality Date    COLONOSCOPY/Suprep N/A 7/30/2019    Performed [facility]    NASAL SEPTUM  SURGERY  03/2019    Dr. Aceves    THYROID LOBECTOMY Left 2/6/2014    and isthmusectomy    THYROIDECTOMY N/A 2/6/2014    [facility]    TONSILLECTOMY         Family History   Problem Relation Age of Onset    Depression Mother     Diabetes Mother     Hypertension Mother     Hypertension Daughter     Heart attack Maternal Grandmother     Hypertension Maternal Grandmother     Cancer Brother     Heart disease Neg Hx     Anemia Neg Hx     Arrhythmia Neg Hx     Asthma Neg Hx     Clotting disorder Neg Hx     Fainting Neg Hx     Heart failure Neg Hx     Hyperlipidemia Neg Hx     Melanoma Neg Hx     Breast cancer Neg Hx     Ovarian cancer Neg Hx        Social History     Tobacco Use    Smoking status: Never Smoker    Smokeless tobacco: Never Used   Substance Use Topics    Alcohol use: No    Drug use: No       Social History     Social History Narrative    Living with her boyfriend. She is from [place]. Her mother and step-father live in [place]. She is a  and teaches voice in elementary. She attended school at [employer] and graduated from [employer]. 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        ALLERGIES:   Review of patient's allergies indicates:   Allergen Reactions    Percocet [oxycodone-acetaminophen]        MEDS:   Current Outpatient Medications:     DULoxetine (CYMBALTA) 30 MG capsule, Take 2 capsules (60 mg total) by mouth once daily., Disp: 180 capsule, Rfl: 3    levonorgestrel (MIRENA) 20 mcg/24 hr (5 years) IUD, 1 Intra Uterine Device by Intrauterine route once. for 1 dose, Disp: 1 Intra Uterine Device, Rfl: 0    multivitamin capsule, Take 1 capsule by mouth once daily., Disp: , Rfl:     OBJECTIVE:   Vitals:    08/13/19 1530   BP: 124/80   BP Location: Left arm   Patient Position: Sitting   BP Method: X-Large (Manual)   Pulse: 89   Resp: 18   Temp: 98.5 °F (36.9 °C)   TempSrc: Oral   SpO2: 98%   Weight: 117.5 kg (259 lb 1.6 oz)  "  Height: 5' 11" (1.803 m)     Body mass index is 36.14 kg/m².    Physical Exam   Constitutional: She is oriented to person, place, and time. She appears well-developed and well-nourished.   HENT:   Head: Normocephalic and atraumatic.   Right Ear: External ear normal.   Left Ear: External ear normal.   Nose: Nose normal.   Mouth/Throat: Oropharynx is clear and moist.   Eyes: Pupils are equal, round, and reactive to light. Conjunctivae and EOM are normal.   Neck: Neck supple. No JVD present.   Cardiovascular: Normal rate, regular rhythm and normal heart sounds.   Pulmonary/Chest: Effort normal and breath sounds normal.   Abdominal: Soft. Bowel sounds are normal.   Musculoskeletal: She exhibits no edema.   Neurological: She is alert and oriented to person, place, and time.   Skin: Skin is warm and dry.   Psychiatric: She has a normal mood and affect. Her behavior is normal.   Nursing note and vitals reviewed.        PERTINENT RESULTS:   CBC:  No results for input(s): WBC, RBC, HGB, HCT, PLT, MCV, MCH, MCHC in the last 2160 hours.  CMP:  No results for input(s): GLU, CALCIUM, ALBUMIN, PROT, NA, K, CO2, CL, BUN, ALKPHOS, ALT, AST, BILITOT in the last 2160 hours.    Invalid input(s): CREATININ  URINALYSIS:  No results for input(s): COLORU, CLARITYU, SPECGRAV, PHUR, PROTEINUA, GLUCOSEU, BILIRUBINCON, BLOODU, WBCU, RBCU, BACTERIA, MUCUS, NITRITE, LEUKOCYTESUR, UROBILINOGEN, HYALINECASTS in the last 2160 hours.   LIPIDS:  No results for input(s): TSH, HDL, CHOL, TRIG, LDLCALC, CHOLHDL, NONHDLCHOL, TOTALCHOLEST in the last 2160 hours.          ASSESSMENT:  Problem List Items Addressed This Visit     None      Visit Diagnoses     Scoliosis, unspecified scoliosis type, unspecified spinal region        Relevant Medications    DULoxetine (CYMBALTA) 30 MG capsule          PLAN:   Orders Placed This Encounter    DULoxetine (CYMBALTA) 30 MG capsule     No orders of the defined types were placed in this encounter.      Follow-up " with me in 6months.   Dr. Bethany Mckinney  Internal Medicine

## 2019-08-28 ENCOUNTER — OFFICE VISIT (OUTPATIENT)
Dept: OBSTETRICS AND GYNECOLOGY | Facility: CLINIC | Age: 34
End: 2019-08-28
Payer: COMMERCIAL

## 2019-08-28 VITALS
DIASTOLIC BLOOD PRESSURE: 78 MMHG | RESPIRATION RATE: 10 BRPM | HEIGHT: 71 IN | WEIGHT: 252 LBS | BODY MASS INDEX: 35.28 KG/M2 | SYSTOLIC BLOOD PRESSURE: 120 MMHG | HEART RATE: 80 BPM

## 2019-08-28 DIAGNOSIS — Z87.42 HISTORY OF OVARIAN CYST: ICD-10-CM

## 2019-08-28 DIAGNOSIS — Z12.4 CERVICAL CANCER SCREENING: ICD-10-CM

## 2019-08-28 DIAGNOSIS — Z86.711 HISTORY OF PULMONARY EMBOLISM: ICD-10-CM

## 2019-08-28 DIAGNOSIS — T83.32XA INTRAUTERINE CONTRACEPTIVE DEVICE THREADS LOST, INITIAL ENCOUNTER: ICD-10-CM

## 2019-08-28 DIAGNOSIS — Z01.419 WELL WOMAN EXAM WITH ROUTINE GYNECOLOGICAL EXAM: Primary | ICD-10-CM

## 2019-08-28 DIAGNOSIS — Z30.431 IUD CHECK UP: ICD-10-CM

## 2019-08-28 PROCEDURE — 99395 PREV VISIT EST AGE 18-39: CPT | Mod: S$GLB,,, | Performed by: OBSTETRICS & GYNECOLOGY

## 2019-08-28 PROCEDURE — 99999 PR PBB SHADOW E&M-EST. PATIENT-LVL III: CPT | Mod: PBBFAC,,, | Performed by: OBSTETRICS & GYNECOLOGY

## 2019-08-28 PROCEDURE — 99999 PR PBB SHADOW E&M-EST. PATIENT-LVL III: ICD-10-PCS | Mod: PBBFAC,,, | Performed by: OBSTETRICS & GYNECOLOGY

## 2019-08-28 PROCEDURE — 87624 HPV HI-RISK TYP POOLED RSLT: CPT

## 2019-08-28 PROCEDURE — 99395 PR PREVENTIVE VISIT,EST,18-39: ICD-10-PCS | Mod: S$GLB,,, | Performed by: OBSTETRICS & GYNECOLOGY

## 2019-08-28 PROCEDURE — 88175 CYTOPATH C/V AUTO FLUID REDO: CPT

## 2019-08-28 NOTE — PROGRESS NOTES
Subjective:    Patient ID: Geri Mosqueda is a 34 y.o. y.o. female.     Chief Complaint: Annual Well Woman Exam     History of Present Illness:  Geri presents today for Annual Well Woman exam. She describes her menses as absent with IUD in place.She admits to pelvic pain.  She denies breast tenderness, masses, nipple discharge. She denies difficulty with urination or bowel movements. She is sexually active. Contraception is by IUD.    Patient has a history of a DVT/PE. She has an IUD in place for cycle regulation, dyspareunia, pelvic pain and history of ovarian cysts.     The following portions of the patient's history were reviewed and updated as appropriate: allergies, current medications, past family history, past medical history, past social history, past surgical history and problem list.      Menstrual History:   No LMP recorded. Patient has had an implant..     OB History        0    Para   0    Term   0       0    AB   0    Living   0       SAB   0    TAB   0    Ectopic   0    Multiple   0    Live Births   0                 The following portions of the patient's history were reviewed and updated as appropriate: allergies, current medications, past family history, past medical history, past social history, past surgical history and problem list.        ROS:     Review of Systems   Constitutional: Negative for activity change, appetite change, chills, diaphoresis, fatigue, fever and unexpected weight change.   HENT: Negative for mouth sores and tinnitus.    Eyes: Negative for discharge and visual disturbance.   Respiratory: Negative for cough, shortness of breath and wheezing.    Cardiovascular: Negative for chest pain, palpitations and leg swelling.   Gastrointestinal: Negative for abdominal pain, bloating, blood in stool, constipation, diarrhea, nausea and vomiting.   Endocrine: Negative for diabetes, hair loss, hot flashes, hyperthyroidism and hypothyroidism.   Genitourinary:  "Positive for dyspareunia and pelvic pain. Negative for dysuria, flank pain, frequency, genital sores, hematuria, urgency, vaginal bleeding, vaginal discharge, vaginal pain, postcoital bleeding and vaginal odor.   Musculoskeletal: Negative for back pain, joint swelling and myalgias.   Integumentary:  Negative for rash, acne, breast mass, nipple discharge and breast skin changes.   Neurological: Negative for seizures, syncope, numbness and headaches.   Hematological: Negative for adenopathy. Does not bruise/bleed easily.   Psychiatric/Behavioral: Negative for depression and sleep disturbance. The patient is not nervous/anxious.    Breast: Negative for mass, mastodynia, nipple discharge and skin changes      Objective:    Vital Signs:  Vitals:    08/28/19 1541   BP: 120/78   Pulse: 80   Resp: 10   Weight: 114.3 kg (252 lb)   Height: 5' 11" (1.803 m)         Physical Exam:  General:  alert, cooperative, appears stated age   Skin:  Skin color, texture, turgor normal. No rashes or lesions   HEENT:  conjunctivae/corneas clear. PERRL.   Neck: supple, trachea midline, no adenopathy or thyromegally   Respiratory:  clear to auscultation bilaterally   Heart:  regular rate and rhythm, S1, S2 normal, no murmur, click, rub or gallop   Breasts:  no discharge, erythema, or tenderness   Abdomen:  normal findings: bowel sounds normal, no masses palpable, no organomegaly and soft, non-tender   Pelvis: External genitalia: normal general appearance  Urinary system: urethral meatus normal, bladder nontender  Vaginal: normal mucosa without prolapse or lesions  Cervix: normal appearance, no IUD strings noted  Uterus: normal size, shape, position  Adnexa: normal size, nontender bilaterally   Extremities: Normal ROM; no edema, no cyanosis   Neurologial: Normal strength and tone. No focal numbness or weakness. Reflexes 2+ and equal.   Psychiatric: normal mood, speech, dress, and thought processes         Assessment:       Healthy female exam. "     1. Well woman exam with routine gynecological exam    2. Cervical cancer screening    3. IUD check up    4. History of ovarian cyst    5. History of pulmonary embolism    6. Intrauterine contraceptive device threads lost, initial encounter          Plan:       Thin prep Pap smear.    HPV cotesting  TVUS; will call with results   Discussed other options for contraception; depo, progesterone only contraception, or nexplanon     COUNSELING:  Geri was counseled on STD pevention, use and side-effects of various contraceptive measures, A.C.O.G. Pap guidelines and recommendations for yearly pelvic exams in addition to recommendations for monthly self breast exams; to see her PCP for other health maintenance.

## 2019-09-05 LAB
HPV HR 12 DNA CVX QL NAA+PROBE: POSITIVE
HPV16 AG SPEC QL: NEGATIVE
HPV18 DNA SPEC QL NAA+PROBE: NEGATIVE

## 2019-09-12 ENCOUNTER — TELEPHONE (OUTPATIENT)
Dept: OBSTETRICS AND GYNECOLOGY | Facility: CLINIC | Age: 34
End: 2019-09-12

## 2019-09-12 NOTE — TELEPHONE ENCOUNTER
----- Message from Any Martin sent at 9/12/2019  1:34 PM CDT -----  Contact: [facility] request  Message     Appointment Request From: Geri Mosqueda    With Provider: Virginia Gtz MD [[facility]]    Preferred Date Range: 9/13/2019 - 9/20/2019    Preferred Times: Any time    Reason for visit: Existing Patient    Comments:  Follow up appointment to discuss HPV test results and determine next steps (other tests, treatment plan, etc)

## 2019-09-13 NOTE — TELEPHONE ENCOUNTER
Spoke with patient, answered questions about normal pap and positive HPV. Pt voiced understanding.

## 2019-09-23 ENCOUNTER — TELEPHONE (OUTPATIENT)
Dept: OBSTETRICS AND GYNECOLOGY | Facility: CLINIC | Age: 34
End: 2019-09-23

## 2019-09-23 NOTE — TELEPHONE ENCOUNTER
----- Message from Virginia Gtz MD sent at 9/11/2019  4:47 PM CDT -----  Pap normal but HPV testing positive; will need close follow up with repeat pap and HPV testing in 1 year.

## 2020-07-31 ENCOUNTER — OFFICE VISIT (OUTPATIENT)
Dept: PODIATRY | Facility: CLINIC | Age: 35
End: 2020-07-31
Payer: MEDICAID

## 2020-07-31 VITALS — HEART RATE: 125 BPM | SYSTOLIC BLOOD PRESSURE: 136 MMHG | DIASTOLIC BLOOD PRESSURE: 89 MMHG

## 2020-07-31 DIAGNOSIS — Z98.890 STATUS POST OPEN REDUCTION WITH INTERNAL FIXATION (ORIF) OF FRACTURE OF ANKLE: Primary | ICD-10-CM

## 2020-07-31 DIAGNOSIS — Z87.81 STATUS POST OPEN REDUCTION WITH INTERNAL FIXATION (ORIF) OF FRACTURE OF ANKLE: Primary | ICD-10-CM

## 2020-07-31 PROCEDURE — 99999 PR PBB SHADOW E&M-EST. PATIENT-LVL III: ICD-10-PCS | Mod: PBBFAC,,, | Performed by: PODIATRIST

## 2020-07-31 PROCEDURE — 99213 OFFICE O/P EST LOW 20 MIN: CPT | Mod: S$PBB,,, | Performed by: PODIATRIST

## 2020-07-31 PROCEDURE — 99213 PR OFFICE/OUTPT VISIT, EST, LEVL III, 20-29 MIN: ICD-10-PCS | Mod: S$PBB,,, | Performed by: PODIATRIST

## 2020-07-31 PROCEDURE — 99213 OFFICE O/P EST LOW 20 MIN: CPT | Mod: PBBFAC,PN | Performed by: PODIATRIST

## 2020-07-31 PROCEDURE — 99999 PR PBB SHADOW E&M-EST. PATIENT-LVL III: CPT | Mod: PBBFAC,,, | Performed by: PODIATRIST

## 2020-07-31 RX ORDER — OXYCODONE AND ACETAMINOPHEN 5; 325 MG/1; MG/1
1 TABLET ORAL EVERY 6 HOURS PRN
Qty: 20 TABLET | Refills: 0 | Status: SHIPPED | OUTPATIENT
Start: 2020-07-31 | End: 2020-08-07 | Stop reason: SDUPTHER

## 2020-07-31 RX ORDER — HYDROCODONE BITARTRATE AND ACETAMINOPHEN 5; 325 MG/1; MG/1
1 TABLET ORAL
COMMUNITY
Start: 2020-07-22 | End: 2021-01-29

## 2020-07-31 RX ORDER — ASPIRIN 325 MG
325 TABLET, DELAYED RELEASE (ENTERIC COATED) ORAL
COMMUNITY
Start: 2020-07-22 | End: 2023-06-08 | Stop reason: ALTCHOICE

## 2020-07-31 NOTE — PROGRESS NOTES
Subjective:      Patient ID: Geri Mosqueda is a 35 y.o. female.    Chief Complaint: f/u right ankle fracture     33 years old female presenting after sustaining right ankle fracture.  It happened on last Wednesday.  She stepped a curb and fell.  She denies any other injury that right ankle.  Pain level was 9/10 but now 6/10 she is taking Norco at home and also Toradol at her work.  She has been nonweightbearing with posterior splint.    1/3/19:  Following up for right ankle fracture.  She has been weight-bearing as tolerated in the boot.  She says pain overall reduced.  She is doing pretty well. She has no pain upon passive range of motion.    7/31/20 status post right ankle fracture by Dr. Rodolfo BROCK ([facility]) on July 22.  Patient lives in [place] and she suffered right ankle fracture during her trip to [place].  She is following up me for postop care for surgery was not operated by me.  Has been nonweightbearing since the surgery in posterior splint.  Pain is not well controlled with Norco. She tells me she can take percocet with Benadryl for itching.        Review of Systems   Constitution: Negative for decreased appetite, fever and malaise/fatigue.   HENT: Negative for congestion.    Cardiovascular: Negative for chest pain and leg swelling.   Respiratory: Negative for cough and shortness of breath.    Skin: Negative for color change, nail changes and rash.   Musculoskeletal: Negative for arthritis, joint pain, joint swelling and muscle weakness.        Right ankle pain   Gastrointestinal: Negative for bloating, abdominal pain, nausea and vomiting.   Neurological: Negative for headaches, numbness and weakness.   Psychiatric/Behavioral: Negative for altered mental status.     Vitals:    07/31/20 1538   BP: 136/89   Pulse: (!) 125   PainSc:   4   PainLoc: Ankle             Objective:      Physical Exam  Constitutional:       General: She is not in acute distress.      Appearance: She is well-developed.   Musculoskeletal:         General: Tenderness present. No deformity.   Skin:     General: Skin is warm and dry.      Capillary Refill: Capillary refill takes less than 2 seconds.      Findings: No erythema.   Neurological:      Mental Status: She is alert and oriented to person, place, and time.   Psychiatric:         Behavior: Behavior normal.       Vascular: Distal DP/PT pulses palpable 2/4 b/l. CRT < 3 sec to tips of toes. No vericosities noted to b/l LEs. Hair growth present b/l LE, warm to touch b/l LE  R ankle:  Mild edema circumferentially.    Dermatologic: No open lesions, lacerations or wounds. Interdigital spaces clean, dry and intact b/l.   Right ankle:  Medial and lateral incision clean, dry, intact.  No rubor, no erythema, no drainage.     Musculoskeletal: No calf tenderness b/l LE, Compartments soft/compressible b/l.   R ankle:  Status post ankle ORIF.  Limited range of motion of the ankle due to swelling and pain.      Neurological: Light touch, proprioception, and sharp/dull sensation are all intact b/l. Protective threshold with the Eglon-Wienstein monofilament is intact b/l. Vibratory sensation intact b/l.         Assessment:       Encounter Diagnosis   Name Primary?    Status post open reduction with internal fixation (ORIF) of fracture of ankle Yes         Plan:       Geri was seen today for follow-up and sx 07/21/2020.    Diagnoses and all orders for this visit:    Status post open reduction with internal fixation (ORIF) of fracture of ankle  -     X-Ray Ankle Complete Right; Future      I counseled the patient on her conditions, their implications and medical management.    -35 y.o. status post right ankle ORIF at OSH.     -rx. Percocet 5mg   -Rx right ankle x-ray  -incision inspected.  Healing well.  Xeroform applied over medial and lateral ankle incision.  Dry sterile dressing and Santillan compression applied.  Posterior splint was applied.  Patient  tolerated well.  Pain is not well controlled with Norco.  I will prescribe Percocet.   -The nature of the condition, options for management, as well as potential risks and complications were discussed in detail with patient. Patient was amenable to my recommendations and left my office fully informed and will follow up as instructed or sooner if necessary.    -f/u 1 week for suture removal.

## 2020-08-06 ENCOUNTER — TELEPHONE (OUTPATIENT)
Dept: PODIATRY | Facility: CLINIC | Age: 35
End: 2020-08-06

## 2020-08-07 ENCOUNTER — OFFICE VISIT (OUTPATIENT)
Dept: PODIATRY | Facility: CLINIC | Age: 35
End: 2020-08-07
Payer: MEDICAID

## 2020-08-07 VITALS — HEART RATE: 102 BPM | SYSTOLIC BLOOD PRESSURE: 136 MMHG | DIASTOLIC BLOOD PRESSURE: 98 MMHG

## 2020-08-07 DIAGNOSIS — Z98.890 STATUS POST OPEN REDUCTION WITH INTERNAL FIXATION (ORIF) OF FRACTURE OF ANKLE: Primary | ICD-10-CM

## 2020-08-07 DIAGNOSIS — Z87.81 STATUS POST OPEN REDUCTION WITH INTERNAL FIXATION (ORIF) OF FRACTURE OF ANKLE: Primary | ICD-10-CM

## 2020-08-07 DIAGNOSIS — T81.31XA SURGICAL WOUND DEHISCENCE, INITIAL ENCOUNTER: ICD-10-CM

## 2020-08-07 PROCEDURE — 99213 PR OFFICE/OUTPT VISIT, EST, LEVL III, 20-29 MIN: ICD-10-PCS | Mod: 25,S$PBB,, | Performed by: PODIATRIST

## 2020-08-07 PROCEDURE — 12020 TX SUPFC WND DEHSN SMPL CLSR: CPT | Mod: PBBFAC,PN | Performed by: PODIATRIST

## 2020-08-07 PROCEDURE — 99213 OFFICE O/P EST LOW 20 MIN: CPT | Mod: PBBFAC,PN,25 | Performed by: PODIATRIST

## 2020-08-07 PROCEDURE — 12020 PR CLOSURE SUPERF WND DEHIS SIMPLE: ICD-10-PCS | Mod: S$PBB,,, | Performed by: PODIATRIST

## 2020-08-07 PROCEDURE — 99999 PR PBB SHADOW E&M-EST. PATIENT-LVL III: ICD-10-PCS | Mod: PBBFAC,,, | Performed by: PODIATRIST

## 2020-08-07 PROCEDURE — 99213 OFFICE O/P EST LOW 20 MIN: CPT | Mod: 25,S$PBB,, | Performed by: PODIATRIST

## 2020-08-07 PROCEDURE — 99999 PR PBB SHADOW E&M-EST. PATIENT-LVL III: CPT | Mod: PBBFAC,,, | Performed by: PODIATRIST

## 2020-08-07 PROCEDURE — 12020 TX SUPFC WND DEHSN SMPL CLSR: CPT | Mod: S$PBB,,, | Performed by: PODIATRIST

## 2020-08-07 RX ORDER — OXYCODONE AND ACETAMINOPHEN 5; 325 MG/1; MG/1
1 TABLET ORAL EVERY 6 HOURS PRN
Qty: 20 TABLET | Refills: 0 | Status: SHIPPED | OUTPATIENT
Start: 2020-08-07 | End: 2021-01-29

## 2020-08-07 NOTE — PROGRESS NOTES
Subjective:      Patient ID: Geri Mosqueda is a 35 y.o. female.    Chief Complaint: f/u right ankle fracture     33 years old female presenting after sustaining right ankle fracture.  It happened on last Wednesday.  She stepped a curb and fell.  She denies any other injury that right ankle.  Pain level was 9/10 but now 6/10 she is taking Norco at home and also Toradol at her work.  She has been nonweightbearing with posterior splint.    1/3/19:  Following up for right ankle fracture.  She has been weight-bearing as tolerated in the boot.  She says pain overall reduced.  She is doing pretty well. She has no pain upon passive range of motion.    7/31/20 status post right ankle fracture by Dr. Rodolfo BROCK ([facility]) on July 22.  Patient lives in [place] and she suffered right ankle fracture during her trip to [place].  She is following up me for postop care for surgery was not operated by me.  Has been nonweightbearing since the surgery in posterior splint.  Pain is not well controlled with Norco. She tells me she can take percocet with Benadryl for itching.     8/7/20 follow-up status post right ankle ORIF by outside hospital.  Presenting with her mother.  Has been nonweightbearing in posterior splint.  Here for staple removal.  Pain is controlled with Percocet.  Denies fall.       Review of Systems   Constitution: Negative for decreased appetite, fever and malaise/fatigue.   HENT: Negative for congestion.    Cardiovascular: Negative for chest pain and leg swelling.   Respiratory: Negative for cough and shortness of breath.    Skin: Negative for color change, nail changes and rash.   Musculoskeletal: Negative for arthritis, joint pain, joint swelling and muscle weakness.        Right ankle pain   Gastrointestinal: Negative for bloating, abdominal pain, nausea and vomiting.   Neurological: Negative for headaches, numbness and weakness.   Psychiatric/Behavioral: Negative  for altered mental status.     Vitals:    08/07/20 1535   BP: (!) 136/98   Pulse: 102   PainSc:   6   PainLoc: Foot             Objective:      Physical Exam  Constitutional:       General: She is not in acute distress.     Appearance: She is well-developed.   Musculoskeletal:         General: Tenderness present. No deformity.   Skin:     General: Skin is warm and dry.      Capillary Refill: Capillary refill takes less than 2 seconds.      Findings: No erythema.   Neurological:      Mental Status: She is alert and oriented to person, place, and time.   Psychiatric:         Behavior: Behavior normal.       Vascular: Distal DP/PT pulses palpable 2/4 b/l. CRT < 3 sec to tips of toes. No vericosities noted to b/l LEs. Hair growth present b/l LE, warm to touch b/l LE  R ankle:  Mild edema circumferentially.    Dermatologic: No open lesions, lacerations or wounds. Interdigital spaces clean, dry and intact b/l.   Right ankle:  Medial and lateral incision clean, dry, intact.  No rubor, no erythema, no drainage.     Musculoskeletal: No calf tenderness b/l LE, Compartments soft/compressible b/l.   R ankle:  Status post ankle ORIF.  Limited range of motion of the ankle due to swelling and pain.      Neurological: Light touch, proprioception, and sharp/dull sensation are all intact b/l. Protective threshold with the [place]-Wienstein monofilament is intact b/l. Vibratory sensation intact b/l.         Assessment:       Encounter Diagnoses   Name Primary?    Status post open reduction with internal fixation (ORIF) of fracture of ankle - Right Foot Yes    Surgical wound dehiscence, initial encounter          Plan:       Geri was seen today for follow-up and heel pain.    Diagnoses and all orders for this visit:    Status post open reduction with internal fixation (ORIF) of fracture of ankle - Right Foot    Surgical wound dehiscence, initial encounter      I counseled the patient on her conditions, their implications and  medical management.    -35 y.o. status post right ankle ORIF at OSH.     -rx. Percocet 5mg.  -right ankle x-ray reviewed with the patient. Plate and screws intact. Pt is aware of osseous fragment distal medial malleolus.   -I removed staples.  It appeared that incisions were completely healed before I removed the staples.  Noticed mild wound dehiscence along the medial and lateral ankle incision distally.  Skin edges appears to be inverted.  I tried to sylvester the skin edges and apply Steri-Strips however skin edges were not staying as everted. I decided to put couple stitches using 2-0 nylon.  I used topical lidocaine gel.  Patient tolerated well.  Verbal consent was obtained. Betadine wet to dry applied with kerlix and ACE.  -I removed posterior splint.  I will put her in Cam walker.  Remain nonweightbearing.  She can start passive range of motion of the ankle.     -The nature of the condition, options for management, as well as potential risks and complications were discussed in detail with patient. Patient was amenable to my recommendations and left my office fully informed and will follow up as instructed or sooner if necessary.  -Patient was advised of signs and symptoms of infection including redness, drainage, purulence, odor, streaking, fever, chills and I advised patient to seek medical attention (ER or urgent care) if these symptoms arise.   -f/u 2 weeks for suture removal.

## 2020-08-10 NOTE — PROGRESS NOTES
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 She reports continued pain and no relief with physical therapy. Reports painful clicking. Pain with squating, stairs, and kneeling. Pain is worse with flexion and better with extension. Pain with weightbearing. Constant dull ache. Has tried ibuprofen as needed.       She locates a sharp pain medially with weight bearing.     She reports that the pain and weakness. It also bothers her at night.  Some relief with meloxicam.    + mechanical symptoms, - instability    Is affecting ADLs.  Pain is 6/10 at it's worst.    REVIEW OF SYSTEMS:  Constitution: Negative. Negative for chills, fever and night sweats.   HENT: Negative for congestion and headaches.    Eyes: Negative for blurred vision, left vision loss and right vision loss.   Cardiovascular: Negative for chest pain and syncope.   Respiratory: Negative for cough and shortness of breath.    Endocrine: Negative for polydipsia, polyphagia and polyuria.   Hematologic/Lymphatic: Negative for bleeding problem. Does not bruise/bleed easily.   Skin: Negative for dry skin, itching and rash.   Musculoskeletal: Negative for falls. Positive for left knee pain and  muscle weakness.   Gastrointestinal: Negative for abdominal pain and bowel incontinence.   Genitourinary: Negative for bladder incontinence and nocturia.   Neurological: Negative for disturbances in coordination, loss of balance and seizures.   Psychiatric/Behavioral: Negative for depression. The patient does not have insomnia.    Allergic/Immunologic: Negative for hives and persistent infections.     PAST MEDICAL HISTORY:    Past Medical History:   Diagnosis Date    DVT (deep venous thrombosis) 2/11/2014    after surgery    Follicular adenoma of thyroid gland 2/6/2014    PE (pulmonary embolism) 2/11/2014    after surgery        PAST SURGICAL HISTORY:   Past Surgical History:   Procedure Laterality Date    THYROID LOBECTOMY Left 2/6/2014    and isthmusectomy    TONSILLECTOMY         FAMILY HISTORY:   Family History   Problem Relation Age of Onset    Depression Mother     Diabetes Mother     Hypertension Mother     Hypertension Daughter     Heart attack Maternal Grandmother     Hypertension Maternal Grandmother     Cancer Brother     Heart disease Neg Hx     Anemia Neg Hx     Arrhythmia Neg Hx     Asthma Neg Hx     Clotting disorder Neg Hx     Fainting Neg Hx     Heart failure Neg Hx     Hyperlipidemia Neg Hx        SOCIAL HISTORY:   Social History     Social History    Marital status: Single     Spouse name: N/A    Number of children: N/A    Years of education: N/A     Occupational History     [employer]     Social History Main Topics    Smoking status: Never Smoker    Smokeless tobacco: Never Used    Alcohol use No    Drug use: No    Sexual activity: Not on file     Other Topics Concern    Not on file     Social History Narrative    Living with a roomate. She is from [place]. Her mother and step-father live in [place]. She is a  and teaches voice in elementary. She attended school at [employer] and graduated from [employer]. 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        MEDICATIONS:     Current Outpatient Prescriptions:     copper (PARAGARD T 380A) 380 square mm IUD, 1 each by Intrauterine route., Disp: , Rfl:     escitalopram oxalate (LEXAPRO) 10 MG tablet, Take 1 tablet (10 mg total) by mouth once daily., Disp: 30 tablet, Rfl: 11    meloxicam (MOBIC) 15 MG tablet, Take 1 tablet (15 mg total) by mouth once daily., Disp: 30 tablet, Rfl: 2    clindamycin (CLEOCIN) 300 MG capsule, Take 300 mg by mouth every 6 (six) hours., Disp: , Rfl:     ALLERGIES:   Review of patient's allergies indicates:   Allergen Reactions    Percocet [oxycodone-acetaminophen]        VITAL SIGNS:  "  /72   Pulse 87   Ht 5' 11" (1.803 m)   Wt 108.9 kg (240 lb)   BMI 33.47 kg/m²      PHYSICAL EXAMINATION  General:  The patient is alert and oriented x 3.  Mood is pleasant.  Observation of ears, eyes and nose reveal no gross abnormalities.  No labored breathing observed.    LEFT KNEE EXAMINATION     OBSERVATION / INSPECTION   Gait:   Antalgic   Alignment:  Neutral   Scars:   None   Muscle atrophy: Mild  Effusion:  Mild  Warmth:  None   Discoloration:   none     TENDERNESS / CREPITUS (T / C):          T / C      T / C   Patella   - / -   Lateral joint line   - / -   Peripatellar medial  -  Medial joint line    + / -   Peripatellar lateral -  Medial plica   - / -   Patellar tendon -   Popliteal fossa   - / -   Quad tendon   -   Gastrocnemius   -   Prepatellar Bursa - / -   Quadricep   -   Tibial tubercle  -  Thigh/hamstring  -   Pes anserine/HS -  Fibula    -   ITB   - / -  Tibia     -   Tib/fib joint  - / -  LCL    -     MFC   - / -   MCL: Proximal  -    LFC   - / -    Distal   -          ROM: (* = pain)  PASSIVE   ACTIVE    Left :   5 / 0 / 135 *   5 / 0 / 130 *     Right :    5 / 0 / 145   5 / 0 / 145    Patellofemoral examination:  See above noted areas of tenderness.   Patella position    Subluxation / dislocation: Centered           Sup. / Inf;   Normal   Crepitus (PF):    Absent   Patellar Mobility:       Medial-lateral:   Normal    Superior-inferior:  Normal    Inferior tilt   Normal    Patellar tendon:  Normal   Lateral tilt:    Normal   J-sign:     None   Patellofemoral grind:   No pain       MENISCAL SIGNS:     Pain on terminal extension:  -  Pain on terminal flexion:  +  Reannas maneuver:  + (for pain)  Squat     + (for pain)    LIGAMENT EXAMINATION:  ACL / Lachman:  normal (-1 to 2mm)    PCL-Post.  drawer: normal 0 to 2mm  MCL- Valgus:  normal 0 to 2mm  LCL- Varus:  normal 0 to 2mm    STRENGTH: (* = with pain) PAINFUL SIDE   Quadricep   4/5   Hamstrin/5    EXTREMITY NEURO-VASCULAR " EXAMINATION:   Sensation:  Grossly intact to light touch all dermatomal regions.   Motor Function:  Fully intact motor function at hip, knee, foot and ankle    DTRs;  quadriceps and  achilles 2+.  No clonus and downgoing Babinski.    Vascular status:  DP and PT pulses 2+, brisk capillary refill, symmetric.     IMAGING:    XRAY (10/10/17):    TECHNIQUE: 4 view radiographs of the right knee and 4 view radiographs of the left knee, including standing.    COMPARISON: N/A      FINDINGS:     Fracture: None.    Joint Space: No effusion.     Soft Tissues: Normal.     MRI Left Knee:    Findings: Routine knee MRI protocol performed without IV contrast. The cruciate ligaments are intact. The lateral collateral and medial collateral ligament complexes are intact. The extensor mechanism is unremarkable. No joint effusion . The lateral meniscus is unremarkable. There is increased linear signal in the posterior horn of the medial meniscus, but does not extend to the articular surface. Generalized cartilage thinning. Small focus of cartilage fissuring in the median eminence of the patella. No subchondral marrow changes. No high-grade chondromalacia identified. The muscle signal is within normal limits. The marrow signal is unremarkable.    Impression:    No discrete meniscal tear. Increased signal noted in the medial meniscus posterior horn, but does not extend to the articular surface.      Small focus of cartilage fissuring in the patella. No high-grade chondromalacia identified.     ASSESSMENT:    Left knee pain      PLAN:   1. Continue Physical therapy  2. Mobic 15 mg 1 time daily PRN for pain management. Patient understands to take with food and/or OTC prilosec to decrease GI side effects.  3. RTC 2 mo  All questions were answered, pt will contact us for questions or concerns in the interim.             None known

## 2020-08-11 ENCOUNTER — NURSE TRIAGE (OUTPATIENT)
Dept: ADMINISTRATIVE | Facility: CLINIC | Age: 35
End: 2020-08-11

## 2020-08-11 NOTE — LETTER
[facility]  [address]  [facility] [address]  Phone: [phone]  Fax: [phone]   Nevarez Pleasant  1985    Mother called nurse line:   Had sx. 7/21/20 is not weight bearing.  Had boot on longer than has been yesterday. Leg with inc in swelling. Pain present behind knee.pain started last night.  Has hx of DVT in the past.     instructed to go to ED for evaluation. Verbalizes understanding.     Reason for Disposition   Thigh or calf pain and only 1 side and present > 1 hour   Thigh or calf swelling and only 1 side    Additional Information   Negative: Sounds like a life-threatening emergency to the triager   Negative: Swollen knee joint and fever    Protocols used: KNEE PAIN-A-OH

## 2020-08-11 NOTE — TELEPHONE ENCOUNTER
Had sx. 7/21/20 is not weight bearing.  Had boot on longer than has been yesterday. Leg with inc in swelling. Pain present behind knee.pain started last night.  Has hx of DVT in the past.     instructed to go to ED for evaluation. Verbalizes understanding.     Reason for Disposition   Thigh or calf pain and only 1 side and present > 1 hour   Thigh or calf swelling and only 1 side    Additional Information   Negative: Sounds like a life-threatening emergency to the triager   Negative: Swollen knee joint and fever    Protocols used: KNEE PAIN-A-OH

## 2020-08-21 ENCOUNTER — OFFICE VISIT (OUTPATIENT)
Dept: PODIATRY | Facility: CLINIC | Age: 35
End: 2020-08-21
Payer: MEDICAID

## 2020-08-21 DIAGNOSIS — Z98.890 STATUS POST ORIF OF FRACTURE OF ANKLE: Primary | ICD-10-CM

## 2020-08-21 DIAGNOSIS — Z87.81 STATUS POST ORIF OF FRACTURE OF ANKLE: Primary | ICD-10-CM

## 2020-08-21 PROCEDURE — 99213 OFFICE O/P EST LOW 20 MIN: CPT | Mod: S$PBB,,, | Performed by: PODIATRIST

## 2020-08-21 PROCEDURE — 99213 OFFICE O/P EST LOW 20 MIN: CPT | Mod: PBBFAC,PN | Performed by: PODIATRIST

## 2020-08-21 PROCEDURE — 99999 PR PBB SHADOW E&M-EST. PATIENT-LVL III: CPT | Mod: PBBFAC,,, | Performed by: PODIATRIST

## 2020-08-21 PROCEDURE — 99213 PR OFFICE/OUTPT VISIT, EST, LEVL III, 20-29 MIN: ICD-10-PCS | Mod: S$PBB,,, | Performed by: PODIATRIST

## 2020-08-21 PROCEDURE — 99999 PR PBB SHADOW E&M-EST. PATIENT-LVL III: ICD-10-PCS | Mod: PBBFAC,,, | Performed by: PODIATRIST

## 2020-08-21 NOTE — PROGRESS NOTES
Subjective:      Patient ID: Geri Mosqueda is a 35 y.o. female.    Chief Complaint: f/u right ankle fracture     33 years old female presenting after sustaining right ankle fracture.  It happened on last Wednesday.  She stepped a curb and fell.  She denies any other injury that right ankle.  Pain level was 9/10 but now 6/10 she is taking Norco at home and also Toradol at her work.  She has been nonweightbearing with posterior splint.    1/3/19:  Following up for right ankle fracture.  She has been weight-bearing as tolerated in the boot.  She says pain overall reduced.  She is doing pretty well. She has no pain upon passive range of motion.    7/31/20 status post right ankle fracture by Dr. Rodolfo BROCK ([facility]) on July 22.  Patient lives in [place] and she suffered right ankle fracture during her trip to [place].  She is following up me for postop care for surgery was not operated by me.  Has been nonweightbearing since the surgery in posterior splint.  Pain is not well controlled with Norco. She tells me she can take percocet with Benadryl for itching.     8/7/20 follow-up status post right ankle ORIF by outside hospital.  Presenting with her mother.  Has been nonweightbearing in posterior splint.  Here for staple removal.  Pain is controlled with Percocet.  Denies fall.    8/21/20 follow-up status post right ankle ORIF outside hospital. Has been nonweightbearing in a long Cam walker.  Patient is here for suture removal.  Denies fall.  Pain is controlled with pain medication.    Review of Systems   Constitution: Negative for decreased appetite, fever and malaise/fatigue.   HENT: Negative for congestion.    Cardiovascular: Negative for chest pain and leg swelling.   Respiratory: Negative for cough and shortness of breath.    Skin: Negative for color change, nail changes and rash.   Musculoskeletal: Negative for arthritis, joint pain, joint swelling and muscle  weakness.        Right ankle pain   Gastrointestinal: Negative for bloating, abdominal pain, nausea and vomiting.   Neurological: Negative for headaches, numbness and weakness.   Psychiatric/Behavioral: Negative for altered mental status.     Vitals:    08/21/20 1515   PainSc:   2   PainLoc: Foot             Objective:      Physical Exam  Constitutional:       General: She is not in acute distress.     Appearance: She is well-developed.   Musculoskeletal:         General: Tenderness present. No deformity.   Skin:     General: Skin is warm and dry.      Capillary Refill: Capillary refill takes less than 2 seconds.      Findings: No erythema.   Neurological:      Mental Status: She is alert and oriented to person, place, and time.   Psychiatric:         Behavior: Behavior normal.       Vascular: Distal DP/PT pulses palpable 2/4 b/l. CRT < 3 sec to tips of toes. No vericosities noted to b/l LEs. Hair growth present b/l LE, warm to touch b/l LE  R ankle:  Mild edema circumferentially.    Dermatologic: No open lesions, lacerations or wounds. Interdigital spaces clean, dry and intact b/l.   Right ankle:  Suture clean, dry, intact.  Both medial and lateral incision healed uneventfully.    Musculoskeletal: No calf tenderness b/l LE, Compartments soft/compressible b/l.   R ankle:  Status post ankle ORIF.  Limited range of motion of the ankle due to swelling and pain.      Neurological: Light touch, proprioception, and sharp/dull sensation are all intact b/l. Protective threshold with the [place]-Wienstein monofilament is intact b/l. Vibratory sensation intact b/l.         Assessment:       Encounter Diagnosis   Name Primary?    Status post ORIF of fracture of ankle - Right Foot Yes         Plan:       Geri was seen today for follow-up.    Diagnoses and all orders for this visit:    Status post ORIF of fracture of ankle - Right Foot  -     Ambulatory referral/consult to Physical/Occupational Therapy; Future      I  counseled the patient on her conditions, their implications and medical management.    -35 y.o. status post right ankle ORIF at OSH.     -Rx physical therapy  -suture removed.  Patient tolerated well.  Verbal consent was obtained.  Incision completely healed.  Recommend nonweightbearing in long Cam walker for another 2 weeks (total of 6 weeks) then transition to weight-bearing as tolerated in Cam walker. C/w passive ROM of ankle for now.     -The nature of the condition, options for management, as well as potential risks and complications were discussed in detail with patient. Patient was amenable to my recommendations and left my office fully informed and will follow up as instructed or sooner if necessary.  -Patient was advised of signs and symptoms of infection including redness, drainage, purulence, odor, streaking, fever, chills and I advised patient to seek medical attention (ER or urgent care) if these symptoms arise.   -f/u 6 weeks.

## 2020-09-29 ENCOUNTER — PATIENT MESSAGE (OUTPATIENT)
Dept: FAMILY MEDICINE | Facility: CLINIC | Age: 35
End: 2020-09-29

## 2020-10-09 ENCOUNTER — OFFICE VISIT (OUTPATIENT)
Dept: PODIATRY | Facility: CLINIC | Age: 35
End: 2020-10-09
Payer: MEDICAID

## 2020-10-09 VITALS — WEIGHT: 256.81 LBS | BODY MASS INDEX: 35.95 KG/M2 | HEIGHT: 71 IN | RESPIRATION RATE: 16 BRPM

## 2020-10-09 DIAGNOSIS — Z98.890 STATUS POST ORIF OF FRACTURE OF ANKLE: Primary | ICD-10-CM

## 2020-10-09 DIAGNOSIS — Z87.81 STATUS POST ORIF OF FRACTURE OF ANKLE: Primary | ICD-10-CM

## 2020-10-09 PROCEDURE — 99999 PR PBB SHADOW E&M-EST. PATIENT-LVL III: CPT | Mod: PBBFAC,,, | Performed by: PODIATRIST

## 2020-10-09 PROCEDURE — 99213 OFFICE O/P EST LOW 20 MIN: CPT | Mod: PBBFAC,PN | Performed by: PODIATRIST

## 2020-10-09 PROCEDURE — 99213 OFFICE O/P EST LOW 20 MIN: CPT | Mod: S$PBB,,, | Performed by: PODIATRIST

## 2020-10-09 PROCEDURE — 99999 PR PBB SHADOW E&M-EST. PATIENT-LVL III: ICD-10-PCS | Mod: PBBFAC,,, | Performed by: PODIATRIST

## 2020-10-09 PROCEDURE — 99213 PR OFFICE/OUTPT VISIT, EST, LEVL III, 20-29 MIN: ICD-10-PCS | Mod: S$PBB,,, | Performed by: PODIATRIST

## 2020-10-09 NOTE — PROGRESS NOTES
Subjective:      Patient ID: eGri Mosqueda is a 35 y.o. female.    Chief Complaint: f/u right ankle fracture     33 years old female presenting after sustaining right ankle fracture.  It happened on last Wednesday.  She stepped a curb and fell.  She denies any other injury that right ankle.  Pain level was 9/10 but now 6/10 she is taking Norco at home and also Toradol at her work.  She has been nonweightbearing with posterior splint.    1/3/19:  Following up for right ankle fracture.  She has been weight-bearing as tolerated in the boot.  She says pain overall reduced.  She is doing pretty well. She has no pain upon passive range of motion.    7/31/20 status post right ankle fracture by Dr. Rodolfo BROCK ([facility]) on July 22.  Patient lives in [place] and she suffered right ankle fracture during her trip to [place].  She is following up me for postop care for surgery was not operated by me.  Has been nonweightbearing since the surgery in posterior splint.  Pain is not well controlled with Norco. She tells me she can take percocet with Benadryl for itching.     8/7/20 follow-up status post right ankle ORIF by outside hospital.  Presenting with her mother.  Has been nonweightbearing in posterior splint.  Here for staple removal.  Pain is controlled with Percocet.  Denies fall.    8/21/20 follow-up status post right ankle ORIF outside hospital. Has been nonweightbearing in a long Cam walker.  Patient is here for suture removal.  Denies fall.  Pain is controlled with pain medication.    10/9/20 f/u status post right ankle ORIF.  Patient is doing well.  Currently going through physical therapy ([facility]- Eleno Avendaño ).  Complains of minimal tenderness on the medial malleolus.  Ambulating in regular tennis shoe.  Patient tells me she took off Cam walker 2 days ago.  Denies a fall.       Review of Systems   Constitution: Negative for decreased  "appetite, fever and malaise/fatigue.   HENT: Negative for congestion.    Cardiovascular: Negative for chest pain and leg swelling.   Respiratory: Negative for cough and shortness of breath.    Skin: Negative for color change, nail changes and rash.   Musculoskeletal: Negative for arthritis, joint pain, joint swelling and muscle weakness.        Right ankle pain   Gastrointestinal: Negative for bloating, abdominal pain, nausea and vomiting.   Neurological: Negative for headaches, numbness and weakness.   Psychiatric/Behavioral: Negative for altered mental status.     Vitals:    10/09/20 1108   Resp: 16   Weight: 116.5 kg (256 lb 12.8 oz)   Height: 5' 11" (1.803 m)   PainSc:   4   PainLoc: Ankle             Objective:      Physical Exam  Constitutional:       General: She is not in acute distress.     Appearance: She is well-developed.   Musculoskeletal:         General: Tenderness present. No deformity.   Skin:     General: Skin is warm and dry.      Capillary Refill: Capillary refill takes less than 2 seconds.      Findings: No erythema.   Neurological:      Mental Status: She is alert and oriented to person, place, and time.   Psychiatric:         Behavior: Behavior normal.       Vascular: Distal DP/PT pulses palpable 2/4 b/l. CRT < 3 sec to tips of toes. No vericosities noted to b/l LEs. Hair growth present b/l LE, warm to touch b/l LE  R ankle:  Mild edema circumferentially.    Dermatologic: No open lesions, lacerations or wounds. Interdigital spaces clean, dry and intact b/l.   Right ankle:  Both medial and lateral incision completely healed.    Musculoskeletal: No calf tenderness b/l LE, Compartments soft/compressible b/l.   R ankle:  Status post ankle ORIF.  Mild tenderness along the medial malleolus. 5/5 MMT of ankle.     Neurological: Light touch, proprioception, and sharp/dull sensation are all intact b/l. Protective threshold with the Freedom-Wienstein monofilament is intact b/l. Vibratory sensation intact " b/l.         Assessment:       Encounter Diagnosis   Name Primary?    Status post ORIF of fracture of ankle - Right Foot Yes         Plan:       Geri was seen today for follow-up and ankle pain.    Diagnoses and all orders for this visit:    Status post ORIF of fracture of ankle - Right Foot      I counseled the patient on her conditions, their implications and medical management.    -35 y.o. status post right ankle ORIF at OSH.     -continue with physical therapy.  Minimal tenderness around the medial malleolus.  Able to move ankle without pain.  Recommend ankle brace as needed.  Ankle brace was dispensed.    -The nature of the condition, options for management, as well as potential risks and complications were discussed in detail with patient. Patient was amenable to my recommendations and left my office fully informed and will follow up as instructed or sooner if necessary.  -Patient was advised of signs and symptoms of infection including redness, drainage, purulence, odor, streaking, fever, chills and I advised patient to seek medical attention (ER or urgent care) if these symptoms arise.   -f/u 3 months

## 2021-01-04 ENCOUNTER — PATIENT MESSAGE (OUTPATIENT)
Dept: PODIATRY | Facility: CLINIC | Age: 36
End: 2021-01-04

## 2021-01-06 ENCOUNTER — TELEPHONE (OUTPATIENT)
Dept: PODIATRY | Facility: CLINIC | Age: 36
End: 2021-01-06

## 2021-01-11 ENCOUNTER — LAB VISIT (OUTPATIENT)
Dept: PRIMARY CARE CLINIC | Facility: OTHER | Age: 36
End: 2021-01-11
Attending: INTERNAL MEDICINE
Payer: MEDICAID

## 2021-01-11 DIAGNOSIS — Z20.822 ENCOUNTER FOR LABORATORY TESTING FOR COVID-19 VIRUS: ICD-10-CM

## 2021-01-11 PROCEDURE — U0003 INFECTIOUS AGENT DETECTION BY NUCLEIC ACID (DNA OR RNA); SEVERE ACUTE RESPIRATORY SYNDROME CORONAVIRUS 2 (SARS-COV-2) (CORONAVIRUS DISEASE [COVID-19]), AMPLIFIED PROBE TECHNIQUE, MAKING USE OF HIGH THROUGHPUT TECHNOLOGIES AS DESCRIBED BY CMS-2020-01-R: HCPCS

## 2021-01-12 LAB — SARS-COV-2 RNA RESP QL NAA+PROBE: NOT DETECTED

## 2021-01-28 ENCOUNTER — OFFICE VISIT (OUTPATIENT)
Dept: PODIATRY | Facility: CLINIC | Age: 36
End: 2021-01-28
Payer: MEDICAID

## 2021-01-28 VITALS
RESPIRATION RATE: 16 BRPM | HEIGHT: 71 IN | SYSTOLIC BLOOD PRESSURE: 130 MMHG | BODY MASS INDEX: 35.82 KG/M2 | HEART RATE: 78 BPM | DIASTOLIC BLOOD PRESSURE: 78 MMHG

## 2021-01-28 DIAGNOSIS — Z98.890 STATUS POST ORIF OF FRACTURE OF ANKLE: Primary | ICD-10-CM

## 2021-01-28 DIAGNOSIS — Z87.81 STATUS POST ORIF OF FRACTURE OF ANKLE: Primary | ICD-10-CM

## 2021-01-28 PROCEDURE — 99213 OFFICE O/P EST LOW 20 MIN: CPT | Mod: S$PBB,,, | Performed by: PODIATRIST

## 2021-01-28 PROCEDURE — 99213 PR OFFICE/OUTPT VISIT, EST, LEVL III, 20-29 MIN: ICD-10-PCS | Mod: S$PBB,,, | Performed by: PODIATRIST

## 2021-01-28 PROCEDURE — 99999 PR PBB SHADOW E&M-EST. PATIENT-LVL III: ICD-10-PCS | Mod: PBBFAC,,, | Performed by: PODIATRIST

## 2021-01-28 PROCEDURE — 99999 PR PBB SHADOW E&M-EST. PATIENT-LVL III: CPT | Mod: PBBFAC,,, | Performed by: PODIATRIST

## 2021-01-28 PROCEDURE — 99213 OFFICE O/P EST LOW 20 MIN: CPT | Mod: PBBFAC,PN | Performed by: PODIATRIST

## 2021-02-23 ENCOUNTER — OFFICE VISIT (OUTPATIENT)
Dept: OBSTETRICS AND GYNECOLOGY | Facility: CLINIC | Age: 36
End: 2021-02-23
Payer: MEDICAID

## 2021-02-23 VITALS
WEIGHT: 262.13 LBS | BODY MASS INDEX: 36.56 KG/M2 | SYSTOLIC BLOOD PRESSURE: 131 MMHG | DIASTOLIC BLOOD PRESSURE: 81 MMHG

## 2021-02-23 DIAGNOSIS — T83.32XA IUD MIGRATION, INITIAL ENCOUNTER: ICD-10-CM

## 2021-02-23 DIAGNOSIS — Z86.718 HISTORY OF DVT OF LOWER EXTREMITY: ICD-10-CM

## 2021-02-23 DIAGNOSIS — G47.33 OSA (OBSTRUCTIVE SLEEP APNEA): ICD-10-CM

## 2021-02-23 DIAGNOSIS — Z86.711 HISTORY OF PULMONARY EMBOLISM: Chronic | ICD-10-CM

## 2021-02-23 DIAGNOSIS — E66.9 OBESITY (BMI 30-39.9): ICD-10-CM

## 2021-02-23 DIAGNOSIS — F41.9 ANXIETY: ICD-10-CM

## 2021-02-23 DIAGNOSIS — Z01.419 ENCOUNTER FOR GYNECOLOGICAL EXAMINATION WITHOUT ABNORMAL FINDING: Primary | ICD-10-CM

## 2021-02-23 PROCEDURE — 99395 PR PREVENTIVE VISIT,EST,18-39: ICD-10-PCS | Mod: S$PBB,,, | Performed by: OBSTETRICS & GYNECOLOGY

## 2021-02-23 PROCEDURE — 99395 PREV VISIT EST AGE 18-39: CPT | Mod: S$PBB,,, | Performed by: OBSTETRICS & GYNECOLOGY

## 2021-02-23 PROCEDURE — 87625 HPV TYPES 16 & 18 ONLY: CPT | Performed by: OBSTETRICS & GYNECOLOGY

## 2021-02-23 PROCEDURE — 99213 OFFICE O/P EST LOW 20 MIN: CPT | Mod: PBBFAC | Performed by: OBSTETRICS & GYNECOLOGY

## 2021-02-23 PROCEDURE — 88175 CYTOPATH C/V AUTO FLUID REDO: CPT

## 2021-02-23 PROCEDURE — 99999 PR PBB SHADOW E&M-EST. PATIENT-LVL III: ICD-10-PCS | Mod: PBBFAC,,, | Performed by: OBSTETRICS & GYNECOLOGY

## 2021-02-23 PROCEDURE — 87624 HPV HI-RISK TYP POOLED RSLT: CPT

## 2021-02-23 PROCEDURE — 99999 PR PBB SHADOW E&M-EST. PATIENT-LVL III: CPT | Mod: PBBFAC,,, | Performed by: OBSTETRICS & GYNECOLOGY

## 2021-02-23 RX ORDER — DULOXETIN HYDROCHLORIDE 30 MG/1
60 CAPSULE, DELAYED RELEASE ORAL DAILY
COMMUNITY
Start: 2021-01-21 | End: 2022-11-18 | Stop reason: SDUPTHER

## 2021-02-24 ENCOUNTER — HOSPITAL ENCOUNTER (OUTPATIENT)
Dept: RADIOLOGY | Facility: OTHER | Age: 36
Discharge: HOME | End: 2021-02-24
Attending: OBSTETRICS & GYNECOLOGY
Payer: MEDICAID

## 2021-02-24 DIAGNOSIS — T83.32XA IUD MIGRATION, INITIAL ENCOUNTER: ICD-10-CM

## 2021-02-24 PROCEDURE — 76856 US EXAM PELVIC COMPLETE: CPT | Mod: 26,,, | Performed by: RADIOLOGY

## 2021-02-24 PROCEDURE — 76830 US PELVIS COMP WITH TRANSVAG NON-OB (XPD): ICD-10-PCS | Mod: 26,,, | Performed by: RADIOLOGY

## 2021-02-24 PROCEDURE — 76856 US PELVIS COMP WITH TRANSVAG NON-OB (XPD): ICD-10-PCS | Mod: 26,,, | Performed by: RADIOLOGY

## 2021-02-24 PROCEDURE — 76830 TRANSVAGINAL US NON-OB: CPT | Mod: 26,,, | Performed by: RADIOLOGY

## 2021-02-24 PROCEDURE — 76856 US EXAM PELVIC COMPLETE: CPT | Mod: TC

## 2021-03-03 ENCOUNTER — PATIENT MESSAGE (OUTPATIENT)
Dept: OBSTETRICS AND GYNECOLOGY | Facility: CLINIC | Age: 36
End: 2021-03-03

## 2021-03-03 LAB
CLINICAL INFO: NORMAL
CYTO CVX: NORMAL
CYTOLOGIST CVX/VAG CYTO: NORMAL
CYTOLOGIST CVX/VAG CYTO: NORMAL
CYTOLOGY CMNT CVX/VAG CYTO-IMP: NORMAL
CYTOLOGY PAP THIN PREP EXPLANATION: NORMAL
DATE OF PREVIOUS PAP: NORMAL
DATE PREVIOUS BX: NO
HPV I/H RISK 4 DNA CVX QL NAA+PROBE: DETECTED
LMP START DATE: NORMAL
SPECIMEN SOURCE CVX/VAG CYTO: NORMAL
STAT OF ADQ CVX/VAG CYTO-IMP: NORMAL

## 2021-03-04 LAB
HPV16 DNA CVX QL PROBE+SIG AMP: NOT DETECTED
HPV18 DNA CVX QL PROBE+SIG AMP: NOT DETECTED

## 2021-03-30 ENCOUNTER — HOSPITAL ENCOUNTER (EMERGENCY)
Facility: OTHER | Age: 36
Discharge: HOME | End: 2021-03-30
Attending: EMERGENCY MEDICINE
Payer: MEDICAID

## 2021-03-30 VITALS
SYSTOLIC BLOOD PRESSURE: 129 MMHG | WEIGHT: 265 LBS | OXYGEN SATURATION: 98 % | BODY MASS INDEX: 37.1 KG/M2 | TEMPERATURE: 98 F | HEART RATE: 74 BPM | RESPIRATION RATE: 18 BRPM | HEIGHT: 71 IN | DIASTOLIC BLOOD PRESSURE: 79 MMHG
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
## 2021-03-30 DIAGNOSIS — S93.402A SPRAIN OF LEFT ANKLE, UNSPECIFIED LIGAMENT, INITIAL ENCOUNTER: Primary | ICD-10-CM

## 2021-03-30 LAB
B-HCG UR QL: NEGATIVE
CTP QC/QA: YES
HCV AB SERPL QL IA: NEGATIVE
HIV 1+2 AB+HIV1 P24 AG SERPL QL IA: NEGATIVE

## 2021-03-30 PROCEDURE — 81025 URINE PREGNANCY TEST: CPT | Performed by: EMERGENCY MEDICINE

## 2021-03-30 PROCEDURE — 99284 EMERGENCY DEPT VISIT MOD MDM: CPT | Mod: 25

## 2021-03-30 PROCEDURE — 86703 HIV-1/HIV-2 1 RESULT ANTBDY: CPT | Performed by: EMERGENCY MEDICINE

## 2021-03-30 PROCEDURE — 86803 HEPATITIS C AB TEST: CPT | Performed by: EMERGENCY MEDICINE

## 2021-03-30 PROCEDURE — 25000003 PHARM REV CODE 250: Performed by: EMERGENCY MEDICINE

## 2021-03-30 RX ORDER — KETOROLAC TROMETHAMINE 10 MG/1
10 TABLET, FILM COATED ORAL
Status: COMPLETED | OUTPATIENT
Start: 2021-03-30 | End: 2021-03-30

## 2021-03-30 RX ORDER — LIDOCAINE 50 MG/G
1 PATCH TOPICAL DAILY PRN
Qty: 15 PATCH | Refills: 0 | Status: SHIPPED | OUTPATIENT
Start: 2021-03-30 | End: 2022-11-18

## 2021-03-30 RX ORDER — NAPROXEN 500 MG/1
500 TABLET ORAL 2 TIMES DAILY WITH MEALS
Qty: 20 TABLET | Refills: 0 | Status: SHIPPED | OUTPATIENT
Start: 2021-03-30 | End: 2022-11-18

## 2021-03-30 RX ADMIN — KETOROLAC TROMETHAMINE 10 MG: 10 TABLET, FILM COATED ORAL at 09:03

## 2021-04-26 ENCOUNTER — PATIENT MESSAGE (OUTPATIENT)
Dept: RESEARCH | Facility: HOSPITAL | Age: 36
End: 2021-04-26

## 2021-05-10 ENCOUNTER — HOSPITAL ENCOUNTER (EMERGENCY)
Facility: OTHER | Age: 36
Discharge: HOME | End: 2021-05-10
Attending: EMERGENCY MEDICINE
Payer: MEDICAID

## 2021-05-10 VITALS
BODY MASS INDEX: 37.1 KG/M2 | DIASTOLIC BLOOD PRESSURE: 77 MMHG | TEMPERATURE: 98 F | OXYGEN SATURATION: 100 % | HEART RATE: 79 BPM | RESPIRATION RATE: 16 BRPM | WEIGHT: 265 LBS | SYSTOLIC BLOOD PRESSURE: 127 MMHG | HEIGHT: 71 IN

## 2021-05-10 DIAGNOSIS — M54.32 SCIATICA OF LEFT SIDE: Primary | ICD-10-CM

## 2021-05-10 DIAGNOSIS — M25.559 HIP PAIN: ICD-10-CM

## 2021-05-10 DIAGNOSIS — R52 PAIN: ICD-10-CM

## 2021-05-10 DIAGNOSIS — R07.9 CHEST PAIN: ICD-10-CM

## 2021-05-10 LAB
ANION GAP SERPL CALC-SCNC: 9 MMOL/L (ref 8–16)
B-HCG UR QL: NEGATIVE
BASOPHILS # BLD AUTO: 0.07 K/UL (ref 0–0.2)
BASOPHILS NFR BLD: 1 % (ref 0–1.9)
BILIRUB UR QL STRIP: NEGATIVE
BNP SERPL-MCNC: <10 PG/ML (ref 0–99)
BUN SERPL-MCNC: 11 MG/DL (ref 6–20)
CALCIUM SERPL-MCNC: 9 MG/DL (ref 8.7–10.5)
CHLORIDE SERPL-SCNC: 107 MMOL/L (ref 95–110)
CLARITY UR: CLEAR
CO2 SERPL-SCNC: 24 MMOL/L (ref 23–29)
COLOR UR: YELLOW
CREAT SERPL-MCNC: 0.7 MG/DL (ref 0.5–1.4)
CTP QC/QA: YES
D DIMER PPP IA.FEU-MCNC: <0.19 MG/L FEU
DIFFERENTIAL METHOD: ABNORMAL
EOSINOPHIL # BLD AUTO: 0.2 K/UL (ref 0–0.5)
EOSINOPHIL NFR BLD: 2.1 % (ref 0–8)
ERYTHROCYTE [DISTWIDTH] IN BLOOD BY AUTOMATED COUNT: 15.2 % (ref 11.5–14.5)
EST. GFR  (AFRICAN AMERICAN): >60 ML/MIN/1.73 M^2
EST. GFR  (NON AFRICAN AMERICAN): >60 ML/MIN/1.73 M^2
EXTRA GREEN TOP RAINBOW: NORMAL
GLUCOSE SERPL-MCNC: 78 MG/DL (ref 70–110)
GLUCOSE UR QL STRIP: NEGATIVE
HCT VFR BLD AUTO: 43 % (ref 37–48.5)
HGB BLD-MCNC: 13.9 G/DL (ref 12–16)
HGB UR QL STRIP: NEGATIVE
IMM GRANULOCYTES # BLD AUTO: 0.05 K/UL (ref 0–0.04)
IMM GRANULOCYTES NFR BLD AUTO: 0.7 % (ref 0–0.5)
KETONES UR QL STRIP: NEGATIVE
LEUKOCYTE ESTERASE UR QL STRIP: NEGATIVE
LYMPHOCYTES # BLD AUTO: 3 K/UL (ref 1–4.8)
LYMPHOCYTES NFR BLD: 43.1 % (ref 18–48)
MCH RBC QN AUTO: 24.4 PG (ref 27–31)
MCHC RBC AUTO-ENTMCNC: 32.3 G/DL (ref 32–36)
MCV RBC AUTO: 75 FL (ref 82–98)
MONOCYTES # BLD AUTO: 0.6 K/UL (ref 0.3–1)
MONOCYTES NFR BLD: 8.7 % (ref 4–15)
NEUTROPHILS # BLD AUTO: 3.1 K/UL (ref 1.8–7.7)
NEUTROPHILS NFR BLD: 44.4 % (ref 38–73)
NITRITE UR QL STRIP: NEGATIVE
NRBC BLD-RTO: 0 /100 WBC
PH UR STRIP: 6 [PH] (ref 5–8)
PLATELET # BLD AUTO: 339 K/UL (ref 150–450)
PMV BLD AUTO: 9.4 FL (ref 9.2–12.9)
POTASSIUM SERPL-SCNC: 3.9 MMOL/L (ref 3.5–5.1)
PROT UR QL STRIP: NEGATIVE
RBC # BLD AUTO: 5.7 M/UL (ref 4–5.4)
SODIUM SERPL-SCNC: 140 MMOL/L (ref 136–145)
SP GR UR STRIP: 1.02 (ref 1–1.03)
URN SPEC COLLECT METH UR: NORMAL
UROBILINOGEN UR STRIP-ACNC: 1 EU/DL
WBC # BLD AUTO: 7 K/UL (ref 3.9–12.7)

## 2021-05-10 PROCEDURE — 85025 COMPLETE CBC W/AUTO DIFF WBC: CPT | Performed by: NURSE PRACTITIONER

## 2021-05-10 PROCEDURE — 93010 ELECTROCARDIOGRAM REPORT: CPT | Mod: ,,, | Performed by: INTERNAL MEDICINE

## 2021-05-10 PROCEDURE — 96372 THER/PROPH/DIAG INJ SC/IM: CPT | Mod: 59

## 2021-05-10 PROCEDURE — 93010 EKG 12-LEAD: ICD-10-PCS | Mod: ,,, | Performed by: INTERNAL MEDICINE

## 2021-05-10 PROCEDURE — 93005 ELECTROCARDIOGRAM TRACING: CPT

## 2021-05-10 PROCEDURE — 99285 EMERGENCY DEPT VISIT HI MDM: CPT | Mod: 25

## 2021-05-10 PROCEDURE — 83880 ASSAY OF NATRIURETIC PEPTIDE: CPT | Performed by: NURSE PRACTITIONER

## 2021-05-10 PROCEDURE — 81025 URINE PREGNANCY TEST: CPT | Performed by: EMERGENCY MEDICINE

## 2021-05-10 PROCEDURE — 80048 BASIC METABOLIC PNL TOTAL CA: CPT | Performed by: EMERGENCY MEDICINE

## 2021-05-10 PROCEDURE — 81003 URINALYSIS AUTO W/O SCOPE: CPT | Performed by: NURSE PRACTITIONER

## 2021-05-10 PROCEDURE — 63600175 PHARM REV CODE 636 W HCPCS: Performed by: NURSE PRACTITIONER

## 2021-05-10 PROCEDURE — 85379 FIBRIN DEGRADATION QUANT: CPT | Performed by: NURSE PRACTITIONER

## 2021-05-10 RX ORDER — KETOROLAC TROMETHAMINE 30 MG/ML
30 INJECTION, SOLUTION INTRAMUSCULAR; INTRAVENOUS
Status: COMPLETED | OUTPATIENT
Start: 2021-05-10 | End: 2021-05-10

## 2021-05-10 RX ORDER — DEXAMETHASONE SODIUM PHOSPHATE 4 MG/ML
8 INJECTION, SOLUTION INTRA-ARTICULAR; INTRALESIONAL; INTRAMUSCULAR; INTRAVENOUS; SOFT TISSUE
Status: COMPLETED | OUTPATIENT
Start: 2021-05-10 | End: 2021-05-10

## 2021-05-10 RX ORDER — METHYLPREDNISOLONE 4 MG/1
TABLET ORAL
Qty: 1 PACKAGE | Refills: 0 | Status: SHIPPED | OUTPATIENT
Start: 2021-05-10 | End: 2022-11-18

## 2021-05-10 RX ORDER — NAPROXEN 375 MG/1
375 TABLET ORAL 2 TIMES DAILY WITH MEALS
Qty: 60 TABLET | Refills: 0 | Status: SHIPPED | OUTPATIENT
Start: 2021-05-10 | End: 2022-11-18

## 2021-05-10 RX ORDER — METHOCARBAMOL 500 MG/1
1000 TABLET, FILM COATED ORAL 3 TIMES DAILY
Qty: 30 TABLET | Refills: 0 | Status: SHIPPED | OUTPATIENT
Start: 2021-05-10 | End: 2021-05-15

## 2021-05-10 RX ORDER — CAPSAICIN, MENTHOL .025; 1.25 G/100G; G/100G
1 PATCH TOPICAL EVERY 12 HOURS
COMMUNITY
Start: 2021-05-10 | End: 2022-12-21 | Stop reason: ALTCHOICE

## 2021-05-10 RX ADMIN — KETOROLAC TROMETHAMINE 30 MG: 30 INJECTION, SOLUTION INTRAMUSCULAR at 04:05

## 2021-05-10 RX ADMIN — DEXAMETHASONE SODIUM PHOSPHATE 8 MG: 4 INJECTION INTRA-ARTICULAR; INTRALESIONAL; INTRAMUSCULAR; INTRAVENOUS; SOFT TISSUE at 05:05

## 2021-05-31 ENCOUNTER — TELEPHONE (OUTPATIENT)
Dept: ADMINISTRATIVE | Facility: OTHER | Age: 36
End: 2021-05-31

## 2022-07-29 ENCOUNTER — OFFICE VISIT (OUTPATIENT)
Dept: DERMATOLOGY | Facility: CLINIC | Age: 37
End: 2022-07-29
Payer: COMMERCIAL

## 2022-07-29 DIAGNOSIS — L30.9 DERMATITIS: Primary | ICD-10-CM

## 2022-07-29 PROCEDURE — 1159F PR MEDICATION LIST DOCUMENTED IN MEDICAL RECORD: ICD-10-PCS | Mod: CPTII,S$GLB,, | Performed by: STUDENT IN AN ORGANIZED HEALTH CARE EDUCATION/TRAINING PROGRAM

## 2022-07-29 PROCEDURE — 88305 TISSUE EXAM BY PATHOLOGIST: CPT | Performed by: DERMATOLOGY

## 2022-07-29 PROCEDURE — 99999 PR PBB SHADOW E&M-EST. PATIENT-LVL III: CPT | Mod: PBBFAC,,, | Performed by: STUDENT IN AN ORGANIZED HEALTH CARE EDUCATION/TRAINING PROGRAM

## 2022-07-29 PROCEDURE — 88305 TISSUE EXAM BY PATHOLOGIST: CPT | Mod: 26,,, | Performed by: DERMATOLOGY

## 2022-07-29 PROCEDURE — 99999 PR PBB SHADOW E&M-EST. PATIENT-LVL III: ICD-10-PCS | Mod: PBBFAC,,, | Performed by: STUDENT IN AN ORGANIZED HEALTH CARE EDUCATION/TRAINING PROGRAM

## 2022-07-29 PROCEDURE — 1160F PR REVIEW ALL MEDS BY PRESCRIBER/CLIN PHARMACIST DOCUMENTED: ICD-10-PCS | Mod: CPTII,S$GLB,, | Performed by: STUDENT IN AN ORGANIZED HEALTH CARE EDUCATION/TRAINING PROGRAM

## 2022-07-29 PROCEDURE — 11104 PR PUNCH BIOPSY, SKIN, SINGLE LESION: ICD-10-PCS | Mod: S$GLB,,, | Performed by: STUDENT IN AN ORGANIZED HEALTH CARE EDUCATION/TRAINING PROGRAM

## 2022-07-29 PROCEDURE — 1160F RVW MEDS BY RX/DR IN RCRD: CPT | Mod: CPTII,S$GLB,, | Performed by: STUDENT IN AN ORGANIZED HEALTH CARE EDUCATION/TRAINING PROGRAM

## 2022-07-29 PROCEDURE — 99203 OFFICE O/P NEW LOW 30 MIN: CPT | Mod: 25,S$GLB,, | Performed by: STUDENT IN AN ORGANIZED HEALTH CARE EDUCATION/TRAINING PROGRAM

## 2022-07-29 PROCEDURE — 11104 PUNCH BX SKIN SINGLE LESION: CPT | Mod: S$GLB,,, | Performed by: STUDENT IN AN ORGANIZED HEALTH CARE EDUCATION/TRAINING PROGRAM

## 2022-07-29 PROCEDURE — 88305 TISSUE EXAM BY PATHOLOGIST: ICD-10-PCS | Mod: 26,,, | Performed by: DERMATOLOGY

## 2022-07-29 PROCEDURE — 99203 PR OFFICE/OUTPT VISIT, NEW, LEVL III, 30-44 MIN: ICD-10-PCS | Mod: 25,S$GLB,, | Performed by: STUDENT IN AN ORGANIZED HEALTH CARE EDUCATION/TRAINING PROGRAM

## 2022-07-29 PROCEDURE — 88312 SPECIAL STAINS GROUP 1: CPT | Mod: 26,,, | Performed by: DERMATOLOGY

## 2022-07-29 PROCEDURE — 1159F MED LIST DOCD IN RCRD: CPT | Mod: CPTII,S$GLB,, | Performed by: STUDENT IN AN ORGANIZED HEALTH CARE EDUCATION/TRAINING PROGRAM

## 2022-07-29 PROCEDURE — 88312 PR  SPECIAL STAINS,GROUP I: ICD-10-PCS | Mod: 26,,, | Performed by: DERMATOLOGY

## 2022-07-29 PROCEDURE — 88312 SPECIAL STAINS GROUP 1: CPT | Performed by: DERMATOLOGY

## 2022-07-29 NOTE — PROGRESS NOTES
Subjective:       Patient ID:  Geri Mosqueda is a 37 y.o. female who presents for   Chief Complaint   Patient presents with    Skin Discoloration    Hyperpigmentation     History of Present Illness: The patient presents with chief complaint of rash/discoloration on the arms and neck.  Location: both arms, upper back and neck  Duration: progressive for years  Signs/Symptoms: darkened, ashy appearance to the skin. Sometimes itchy, but overall asymptomatic  Prior treatments: none. Previously seen by outside dermatologist where she had biopsy, but unsure diagnosis. No treatment back then.         Review of Systems   Constitutional: Negative for fever and chills.        Objective:    Physical Exam   Constitutional: She appears well-developed and well-nourished. No distress.   Neurological: She is alert and oriented to person, place, and time. She is not disoriented.   Psychiatric: She has a normal mood and affect.   Skin:   Areas Examined (abnormalities noted in diagram):   Head / Face Inspection Performed  Neck Inspection Performed  Chest / Axilla Inspection Performed  Back Inspection Performed  RUE Inspected  LUE Inspection Performed              Diagram Legend     Erythematous scaling macule/papule c/w actinic keratosis       Vascular papule c/w angioma      Pigmented verrucoid papule/plaque c/w seborrheic keratosis      Yellow umbilicated papule c/w sebaceous hyperplasia      Irregularly shaped tan macule c/w lentigo     1-2 mm smooth white papules consistent with Milia      Movable subcutaneous cyst with punctum c/w epidermal inclusion cyst      Subcutaneous movable cyst c/w pilar cyst      Firm pink to brown papule c/w dermatofibroma      Pedunculated fleshy papule(s) c/w skin tag(s)      Evenly pigmented macule c/w junctional nevus     Mildly variegated pigmented, slightly irregular-bordered macule c/w mildly atypical nevus      Flesh colored to evenly pigmented papule c/w intradermal nevus        Pink pearly papule/plaque c/w basal cell carcinoma      Erythematous hyperkeratotic cursted plaque c/w SCC      Surgical scar with no sign of skin cancer recurrence      Open and closed comedones      Inflammatory papules and pustules      Verrucoid papule consistent consistent with wart     Erythematous eczematous patches and plaques     Dystrophic onycholytic nail with subungual debris c/w onychomycosis     Umbilicated papule    Erythematous-base heme-crusted tan verrucoid plaque consistent with inflamed seborrheic keratosis     Erythematous Silvery Scaling Plaque c/w Psoriasis     See annotation              Assessment / Plan:      Pathology Orders:     Normal Orders This Visit    Specimen to Pathology, Dermatology     Comments:    Number of Specimens:->1  ------------------------->-------------------------  Spec 1 Procedure:->Biopsy  Spec 1 Clinical Impression:->persistent ashy hyperpigmented  plaques for years. EDP vs CTCL vs other  Spec 1 Source:->left arm    Questions:    Procedure Type: Dermatology and skin neoplasms    Number of Specimens: 1    ------------------------: -------------------------    Spec 1 Procedure: Biopsy    Spec 1 Clinical Impression: persistent ashy hyperpigmented plaques for years. EDP vs CTCL vs other    Spec 1 Source: left arm    Release to patient:         Dermatitis  -     Specimen to Pathology, Dermatology    Punch biopsy procedure note:  Punch biopsy performed after verbal consent obtained. Area marked and prepped with alcohol. Approximately 1cc of 1% lidocaine with epinephrine injected. 4 mm disposable punch used to remove lesion. Hemostasis obtained and biopsy site closed with 1 - 2 Prolene sutures. Wound care instructions reviewed with patient and handout given.    Further management pending biopsy results.          Follow up in about 4 weeks (around 8/26/2022).

## 2022-08-03 ENCOUNTER — PATIENT MESSAGE (OUTPATIENT)
Dept: DERMATOLOGY | Facility: CLINIC | Age: 37
End: 2022-08-03
Payer: COMMERCIAL

## 2022-08-05 ENCOUNTER — CLINICAL SUPPORT (OUTPATIENT)
Dept: DERMATOLOGY | Facility: CLINIC | Age: 37
End: 2022-08-05
Payer: COMMERCIAL

## 2022-08-05 DIAGNOSIS — Z48.02 VISIT FOR SUTURE REMOVAL: Primary | ICD-10-CM

## 2022-08-05 PROCEDURE — 99999 PR PBB SHADOW E&M-EST. PATIENT-LVL I: ICD-10-PCS | Mod: PBBFAC,,,

## 2022-08-05 PROCEDURE — 99999 PR PBB SHADOW E&M-EST. PATIENT-LVL I: CPT | Mod: PBBFAC,,,

## 2022-08-11 LAB
FINAL PATHOLOGIC DIAGNOSIS: NORMAL
GROSS: NORMAL
Lab: NORMAL
MICROSCOPIC EXAM: NORMAL

## 2022-08-12 ENCOUNTER — PATIENT MESSAGE (OUTPATIENT)
Dept: DERMATOLOGY | Facility: CLINIC | Age: 37
End: 2022-08-12
Payer: COMMERCIAL

## 2022-08-12 RX ORDER — TACROLIMUS 1 MG/G
OINTMENT TOPICAL 2 TIMES DAILY
Qty: 100 G | Refills: 2 | Status: SHIPPED | OUTPATIENT
Start: 2022-08-12 | End: 2023-09-06

## 2022-08-18 ENCOUNTER — PATIENT MESSAGE (OUTPATIENT)
Dept: DERMATOLOGY | Facility: CLINIC | Age: 37
End: 2022-08-18
Payer: COMMERCIAL

## 2022-09-02 ENCOUNTER — CLINICAL SUPPORT (OUTPATIENT)
Dept: OTHER | Facility: CLINIC | Age: 37
End: 2022-09-02
Payer: COMMERCIAL

## 2022-09-02 DIAGNOSIS — Z00.8 ENCOUNTER FOR OTHER GENERAL EXAMINATION: ICD-10-CM

## 2022-09-03 VITALS
WEIGHT: 239 LBS | DIASTOLIC BLOOD PRESSURE: 76 MMHG | HEIGHT: 71 IN | SYSTOLIC BLOOD PRESSURE: 114 MMHG | BODY MASS INDEX: 33.46 KG/M2

## 2022-09-03 LAB
GLUCOSE SERPL-MCNC: 92 MG/DL (ref 60–140)
HDLC SERPL-MCNC: 14 MG/DL
POC CHOLESTEROL, TOTAL: 143 MG/DL
TRIGL SERPL-MCNC: 164 MG/DL

## 2022-09-21 ENCOUNTER — TELEPHONE (OUTPATIENT)
Dept: PRIMARY CARE CLINIC | Facility: CLINIC | Age: 37
End: 2022-09-21
Payer: COMMERCIAL
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
## 2022-09-21 NOTE — TELEPHONE ENCOUNTER
Spoke to pt re: scheduling sooner appt nothing available. Pt states she will go to ER she wants xrays done.

## 2022-10-24 ENCOUNTER — OFFICE VISIT (OUTPATIENT)
Dept: URGENT CARE | Facility: CLINIC | Age: 37
End: 2022-10-24
Payer: COMMERCIAL

## 2022-10-24 VITALS
WEIGHT: 239 LBS | BODY MASS INDEX: 33.46 KG/M2 | DIASTOLIC BLOOD PRESSURE: 79 MMHG | TEMPERATURE: 98 F | RESPIRATION RATE: 18 BRPM | HEIGHT: 71 IN | HEART RATE: 99 BPM | OXYGEN SATURATION: 98 % | SYSTOLIC BLOOD PRESSURE: 130 MMHG

## 2022-10-24 DIAGNOSIS — J40 BRONCHITIS: ICD-10-CM

## 2022-10-24 DIAGNOSIS — J01.00 ACUTE MAXILLARY SINUSITIS, RECURRENCE NOT SPECIFIED: Primary | ICD-10-CM

## 2022-10-24 LAB
CTP QC/QA: YES
CTP QC/QA: YES
POC MOLECULAR INFLUENZA A AGN: NEGATIVE
POC MOLECULAR INFLUENZA B AGN: NEGATIVE
SARS-COV-2 RDRP RESP QL NAA+PROBE: NEGATIVE

## 2022-10-24 PROCEDURE — 1159F MED LIST DOCD IN RCRD: CPT | Mod: CPTII,S$GLB,, | Performed by: PHYSICIAN ASSISTANT

## 2022-10-24 PROCEDURE — 87502 POCT INFLUENZA A/B MOLECULAR: ICD-10-PCS | Mod: QW,S$GLB,, | Performed by: PHYSICIAN ASSISTANT

## 2022-10-24 PROCEDURE — 87635 SARS-COV-2 COVID-19 AMP PRB: CPT | Mod: QW,S$GLB,, | Performed by: PHYSICIAN ASSISTANT

## 2022-10-24 PROCEDURE — 1160F PR REVIEW ALL MEDS BY PRESCRIBER/CLIN PHARMACIST DOCUMENTED: ICD-10-PCS | Mod: CPTII,S$GLB,, | Performed by: PHYSICIAN ASSISTANT

## 2022-10-24 PROCEDURE — 99203 OFFICE O/P NEW LOW 30 MIN: CPT | Mod: S$GLB,,, | Performed by: PHYSICIAN ASSISTANT

## 2022-10-24 PROCEDURE — 3075F SYST BP GE 130 - 139MM HG: CPT | Mod: CPTII,S$GLB,, | Performed by: PHYSICIAN ASSISTANT

## 2022-10-24 PROCEDURE — 3078F DIAST BP <80 MM HG: CPT | Mod: CPTII,S$GLB,, | Performed by: PHYSICIAN ASSISTANT

## 2022-10-24 PROCEDURE — 99203 PR OFFICE/OUTPT VISIT, NEW, LEVL III, 30-44 MIN: ICD-10-PCS | Mod: S$GLB,,, | Performed by: PHYSICIAN ASSISTANT

## 2022-10-24 PROCEDURE — 1159F PR MEDICATION LIST DOCUMENTED IN MEDICAL RECORD: ICD-10-PCS | Mod: CPTII,S$GLB,, | Performed by: PHYSICIAN ASSISTANT

## 2022-10-24 PROCEDURE — 3078F PR MOST RECENT DIASTOLIC BLOOD PRESSURE < 80 MM HG: ICD-10-PCS | Mod: CPTII,S$GLB,, | Performed by: PHYSICIAN ASSISTANT

## 2022-10-24 PROCEDURE — 87635: ICD-10-PCS | Mod: QW,S$GLB,, | Performed by: PHYSICIAN ASSISTANT

## 2022-10-24 PROCEDURE — 1160F RVW MEDS BY RX/DR IN RCRD: CPT | Mod: CPTII,S$GLB,, | Performed by: PHYSICIAN ASSISTANT

## 2022-10-24 PROCEDURE — 3075F PR MOST RECENT SYSTOLIC BLOOD PRESS GE 130-139MM HG: ICD-10-PCS | Mod: CPTII,S$GLB,, | Performed by: PHYSICIAN ASSISTANT

## 2022-10-24 PROCEDURE — 87502 INFLUENZA DNA AMP PROBE: CPT | Mod: QW,S$GLB,, | Performed by: PHYSICIAN ASSISTANT

## 2022-10-24 RX ORDER — PROMETHAZINE HYDROCHLORIDE AND DEXTROMETHORPHAN HYDROBROMIDE 6.25; 15 MG/5ML; MG/5ML
5 SYRUP ORAL NIGHTLY PRN
Qty: 118 ML | Refills: 0 | Status: SHIPPED | OUTPATIENT
Start: 2022-10-24 | End: 2022-11-03

## 2022-10-24 RX ORDER — BENZONATATE 200 MG/1
200 CAPSULE ORAL 3 TIMES DAILY PRN
Qty: 30 CAPSULE | Refills: 0 | Status: SHIPPED | OUTPATIENT
Start: 2022-10-24 | End: 2022-11-03

## 2022-10-24 RX ORDER — AMOXICILLIN AND CLAVULANATE POTASSIUM 875; 125 MG/1; MG/1
1 TABLET, FILM COATED ORAL 2 TIMES DAILY
Qty: 14 TABLET | Refills: 0 | Status: SHIPPED | OUTPATIENT
Start: 2022-10-24 | End: 2022-10-31

## 2022-10-24 NOTE — PATIENT INSTRUCTIONS
- Rest.    - Drink plenty of fluids.    - Tylenol or Ibuprofen as directed as needed for fever/pain. Avoid tylenol if you have a history of liver disease. Do not take ibuprofen if you have a history of GI bleeding, kidney disease, or if you take blood thinners.     - You can take over-the-counter claritin, zyrtec, allegra, or xyzal as directed. These are antihistamines that can help with runny nose, nasal congestion, sneezing, and helps to dry up post-nasal drip, which usually causes sore throat and cough.   - If you do NOT have high blood pressure, you may use a decongestant form (D)  of this medication (ie. Claritin- D, zyrtec-D, allegra-D) or if you do not take the D form, you can take sudafed (pseudoephedrine) over the counter, which is a decongestant. Do NOT take two decongestant (D) medications at the same time (such as mucinex-D and claritin-D or plain sudafed and claritin D)    - You can use Flonase (fluticasone) nasal spray as directed for sinus congestion and postnasal drip. This is a steroid nasal spray that works locally over time to decrease the inflammation in your nose/sinuses and help with allergic symptoms. This is not an quick- relief spray like afrin, but it works well if used daily.  Discontinue if you develop nose bleed  - use nasal saline prior to Flonase.  - Use Ocean Spray Nasal Saline 1-3 puffs each nostril every 2-3 hours then blow out onto tissue. This is to irrigate the nasal passage way to clear the sinus openings. Use until sinus problem resolved.    - you can take plain Mucinex (guaifenesin) 1200 mg twice a day to help loosen mucous.     -warm salt water gargles can help with sore throat    - warm tea with honey can help with cough. Honey is a natural cough suppressant.    - Take the tessalon (benzonatate) as needed as prescribed for cough   - Only take the promethazine- DM as directed, as needed at night for cough. This medication may cause drowsiness.        - You have been given an  antibiotic to treat your condition today.    - Please complete the antibiotic as directed on the bottle.   - If you are female and on oral birth control pills, use additional methods to prevent pregnancy while on antibiotics and for one cycle after.   - you can take otc probiotic to limit upset stomach  -Please take Augmentin with food as this medication may cause upset stomach  - You were given a prescription for antibiotic, but do not start taking it yet.  Wait 2-3 days to see if your symptoms improve without it.  If they don't or you get significantly worse, then start the antibiotics.      - Follow up with your PCP or specialty clinic as directed in the next 1-2 weeks if not improved or as needed.  You can call [phone] to schedule an appointment with the appropriate provider.      - Go to the ER if you develop new or worsening symptoms.     - You must understand that you have received an Urgent Care treatment only and that you may be released before all of your medical problems are known or treated.   - You, the patient, will arrange for follow up care as instructed.   - If your condition worsens or fails to improve we recommend that you receive another evaluation at the ER immediately or contact your PCP to discuss your concerns or return here.

## 2022-10-24 NOTE — PROGRESS NOTES
"Subjective:       Patient ID: Geri Mosqueda is a 37 y.o. female.    Vitals:  height is 5' 11" (1.803 m) and weight is 108.4 kg (238 lb 15.7 oz). Her temperature is 98.2 °F (36.8 °C). Her blood pressure is 130/79 and her pulse is 99. Her respiration is 18 and oxygen saturation is 98%.     Chief Complaint: Cough    Pt reports having cough, ear pain, sinus pressure, nasal congestion, and body aches for over the past week.     Cough  This is a new problem. The current episode started 1 to 4 weeks ago. The cough is Productive of sputum. Associated symptoms include chills, ear pain, myalgias, nasal congestion, postnasal drip and a sore throat. Pertinent negatives include no chest pain, eye redness, fever or rash. She has tried nothing for the symptoms. The treatment provided no relief. There is no history of asthma or bronchitis.     Constitution: Positive for chills and fatigue. Negative for sweating, fever and unexpected weight change.   HENT:  Positive for ear pain, congestion, postnasal drip, sinus pain, sinus pressure and sore throat. Negative for ear discharge, foreign body in ear and tinnitus.    Neck: Negative for neck pain and neck stiffness.   Cardiovascular:  Negative for chest pain, leg swelling and palpitations.   Eyes:  Negative for eye itching, eye pain and eye redness.   Respiratory:  Positive for cough.    Gastrointestinal:  Negative for abdominal pain, nausea, vomiting and diarrhea.   Genitourinary:  Negative for dysuria, frequency, urgency and flank pain.   Musculoskeletal:  Positive for muscle ache. Negative for joint pain and abnormal ROM of joint.   Skin:  Negative for color change and rash.   Neurological:  Negative for dizziness, light-headedness, speech difficulty, disorientation, altered mental status, numbness and tingling.   Psychiatric/Behavioral:  Negative for altered mental status and disorientation.      Objective:      Physical Exam   Constitutional: She is oriented to person, " place, and time. She appears well-developed. She is cooperative.  Non-toxic appearance. She does not appear ill. No distress.   HENT:   Head: Normocephalic and atraumatic.   Ears:   Right Ear: Hearing, tympanic membrane, external ear and ear canal normal.   Left Ear: Hearing, external ear and ear canal normal. A middle ear effusion (clear. no infection) is present.   Nose: Mucosal edema present. No rhinorrhea or nasal deformity. No epistaxis. Right sinus exhibits maxillary sinus tenderness and frontal sinus tenderness. Left sinus exhibits maxillary sinus tenderness and frontal sinus tenderness.   Mouth/Throat: Uvula is midline, oropharynx is clear and moist and mucous membranes are normal. No trismus in the jaw. Normal dentition. No uvula swelling. No oropharyngeal exudate, posterior oropharyngeal edema or posterior oropharyngeal erythema. Tonsils are 1+ on the right. Tonsils are 1+ on the left. No tonsillar exudate.   Eyes: Conjunctivae and lids are normal. No scleral icterus.   Neck: Trachea normal and phonation normal. Neck supple. No edema present. No erythema present. No neck rigidity present.   Cardiovascular: Normal rate, regular rhythm, normal heart sounds and normal pulses.   Pulmonary/Chest: Effort normal and breath sounds normal. No accessory muscle usage or stridor. No tachypnea and no bradypnea. No respiratory distress. She has no decreased breath sounds. She has no wheezes. She has no rhonchi. She has no rales.   Abdominal: Normal appearance.   Musculoskeletal: Normal range of motion.         General: No deformity. Normal range of motion.   Lymphadenopathy:        Head (right side): Submandibular adenopathy present.        Head (left side): Submandibular adenopathy present.     She has no cervical adenopathy.   Neurological: She is alert and oriented to person, place, and time. She exhibits normal muscle tone. Coordination normal.   Skin: Skin is warm, dry, intact, not diaphoretic and not pale.    Psychiatric: Her speech is normal and behavior is normal. Judgment and thought content normal.   Nursing note and vitals reviewed.        Results for orders placed or performed in visit on 10/24/22   POCT Influenza A/B MOLECULAR   Result Value Ref Range    POC Molecular Influenza A Ag Negative Negative, Not Reported    POC Molecular Influenza B Ag Negative Negative, Not Reported     Acceptable Yes    POCT COVID-19 Rapid Screening   Result Value Ref Range    POC Rapid COVID Negative Negative     Acceptable Yes        Assessment:       1. Acute maxillary sinusitis, recurrence not specified    2. Bronchitis          Plan:         - Discussed ddx, home care, tx options, and given follow up precautions.     Acute maxillary sinusitis, recurrence not specified  -     amoxicillin-clavulanate 875-125mg (AUGMENTIN) 875-125 mg per tablet; Take 1 tablet by mouth 2 (two) times daily. for 7 days  Dispense: 14 tablet; Refill: 0    Bronchitis  -     POCT Influenza A/B MOLECULAR  -     POCT COVID-19 Rapid Screening  -     benzonatate (TESSALON) 200 MG capsule; Take 1 capsule (200 mg total) by mouth 3 (three) times daily as needed for Cough.  Dispense: 30 capsule; Refill: 0  -     promethazine-dextromethorphan (PROMETHAZINE-DM) 6.25-15 mg/5 mL Syrp; Take 5 mLs by mouth nightly as needed (cough).  Dispense: 118 mL; Refill: 0       Patient Instructions   - Rest.    - Drink plenty of fluids.    - Tylenol or Ibuprofen as directed as needed for fever/pain. Avoid tylenol if you have a history of liver disease. Do not take ibuprofen if you have a history of GI bleeding, kidney disease, or if you take blood thinners.     - You can take over-the-counter claritin, zyrtec, allegra, or xyzal as directed. These are antihistamines that can help with runny nose, nasal congestion, sneezing, and helps to dry up post-nasal drip, which usually causes sore throat and cough.   - If you do NOT have high blood pressure, you  may use a decongestant form (D)  of this medication (ie. Claritin- D, zyrtec-D, allegra-D) or if you do not take the D form, you can take sudafed (pseudoephedrine) over the counter, which is a decongestant. Do NOT take two decongestant (D) medications at the same time (such as mucinex-D and claritin-D or plain sudafed and claritin D)    - You can use Flonase (fluticasone) nasal spray as directed for sinus congestion and postnasal drip. This is a steroid nasal spray that works locally over time to decrease the inflammation in your nose/sinuses and help with allergic symptoms. This is not an quick- relief spray like afrin, but it works well if used daily.  Discontinue if you develop nose bleed  - use nasal saline prior to Flonase.  - Use Ocean Spray Nasal Saline 1-3 puffs each nostril every 2-3 hours then blow out onto tissue. This is to irrigate the nasal passage way to clear the sinus openings. Use until sinus problem resolved.    - you can take plain Mucinex (guaifenesin) 1200 mg twice a day to help loosen mucous.     -warm salt water gargles can help with sore throat    - warm tea with honey can help with cough. Honey is a natural cough suppressant.    - Take the tessalon (benzonatate) as needed as prescribed for cough   - Only take the promethazine- DM as directed, as needed at night for cough. This medication may cause drowsiness.        - You have been given an antibiotic to treat your condition today.    - Please complete the antibiotic as directed on the bottle.   - If you are female and on oral birth control pills, use additional methods to prevent pregnancy while on antibiotics and for one cycle after.   - you can take otc probiotic to limit upset stomach  -Please take Augmentin with food as this medication may cause upset stomach  - You were given a prescription for antibiotic, but do not start taking it yet.  Wait 2-3 days to see if your symptoms improve without it.  If they don't or you get significantly  worse, then start the antibiotics.      - Follow up with your PCP or specialty clinic as directed in the next 1-2 weeks if not improved or as needed.  You can call [phone] to schedule an appointment with the appropriate provider.      - Go to the ER if you develop new or worsening symptoms.     - You must understand that you have received an Urgent Care treatment only and that you may be released before all of your medical problems are known or treated.   - You, the patient, will arrange for follow up care as instructed.   - If your condition worsens or fails to improve we recommend that you receive another evaluation at the ER immediately or contact your PCP to discuss your concerns or return here.

## 2022-11-18 ENCOUNTER — OFFICE VISIT (OUTPATIENT)
Dept: PRIMARY CARE CLINIC | Facility: CLINIC | Age: 37
End: 2022-11-18
Payer: COMMERCIAL

## 2022-11-18 VITALS
HEART RATE: 79 BPM | DIASTOLIC BLOOD PRESSURE: 85 MMHG | HEIGHT: 71 IN | WEIGHT: 245.38 LBS | OXYGEN SATURATION: 99 % | SYSTOLIC BLOOD PRESSURE: 125 MMHG | TEMPERATURE: 98 F | RESPIRATION RATE: 18 BRPM | BODY MASS INDEX: 34.35 KG/M2

## 2022-11-18 DIAGNOSIS — Z00.00 ROUTINE CHECK-UP: Primary | ICD-10-CM

## 2022-11-18 PROCEDURE — 99999 PR PBB SHADOW E&M-EST. PATIENT-LVL IV: CPT | Mod: PBBFAC,,, | Performed by: STUDENT IN AN ORGANIZED HEALTH CARE EDUCATION/TRAINING PROGRAM

## 2022-11-18 PROCEDURE — 99395 PREV VISIT EST AGE 18-39: CPT | Mod: S$GLB,,, | Performed by: STUDENT IN AN ORGANIZED HEALTH CARE EDUCATION/TRAINING PROGRAM

## 2022-11-18 PROCEDURE — 1159F MED LIST DOCD IN RCRD: CPT | Mod: CPTII,S$GLB,, | Performed by: STUDENT IN AN ORGANIZED HEALTH CARE EDUCATION/TRAINING PROGRAM

## 2022-11-18 PROCEDURE — 3079F PR MOST RECENT DIASTOLIC BLOOD PRESSURE 80-89 MM HG: ICD-10-PCS | Mod: CPTII,S$GLB,, | Performed by: STUDENT IN AN ORGANIZED HEALTH CARE EDUCATION/TRAINING PROGRAM

## 2022-11-18 PROCEDURE — 3008F PR BODY MASS INDEX (BMI) DOCUMENTED: ICD-10-PCS | Mod: CPTII,S$GLB,, | Performed by: STUDENT IN AN ORGANIZED HEALTH CARE EDUCATION/TRAINING PROGRAM

## 2022-11-18 PROCEDURE — 3074F PR MOST RECENT SYSTOLIC BLOOD PRESSURE < 130 MM HG: ICD-10-PCS | Mod: CPTII,S$GLB,, | Performed by: STUDENT IN AN ORGANIZED HEALTH CARE EDUCATION/TRAINING PROGRAM

## 2022-11-18 PROCEDURE — 1160F RVW MEDS BY RX/DR IN RCRD: CPT | Mod: CPTII,S$GLB,, | Performed by: STUDENT IN AN ORGANIZED HEALTH CARE EDUCATION/TRAINING PROGRAM

## 2022-11-18 PROCEDURE — 3074F SYST BP LT 130 MM HG: CPT | Mod: CPTII,S$GLB,, | Performed by: STUDENT IN AN ORGANIZED HEALTH CARE EDUCATION/TRAINING PROGRAM

## 2022-11-18 PROCEDURE — 3079F DIAST BP 80-89 MM HG: CPT | Mod: CPTII,S$GLB,, | Performed by: STUDENT IN AN ORGANIZED HEALTH CARE EDUCATION/TRAINING PROGRAM

## 2022-11-18 PROCEDURE — 99395 PR PREVENTIVE VISIT,EST,18-39: ICD-10-PCS | Mod: S$GLB,,, | Performed by: STUDENT IN AN ORGANIZED HEALTH CARE EDUCATION/TRAINING PROGRAM

## 2022-11-18 PROCEDURE — 3008F BODY MASS INDEX DOCD: CPT | Mod: CPTII,S$GLB,, | Performed by: STUDENT IN AN ORGANIZED HEALTH CARE EDUCATION/TRAINING PROGRAM

## 2022-11-18 PROCEDURE — 99999 PR PBB SHADOW E&M-EST. PATIENT-LVL IV: ICD-10-PCS | Mod: PBBFAC,,, | Performed by: STUDENT IN AN ORGANIZED HEALTH CARE EDUCATION/TRAINING PROGRAM

## 2022-11-18 PROCEDURE — 1160F PR REVIEW ALL MEDS BY PRESCRIBER/CLIN PHARMACIST DOCUMENTED: ICD-10-PCS | Mod: CPTII,S$GLB,, | Performed by: STUDENT IN AN ORGANIZED HEALTH CARE EDUCATION/TRAINING PROGRAM

## 2022-11-18 PROCEDURE — 1159F PR MEDICATION LIST DOCUMENTED IN MEDICAL RECORD: ICD-10-PCS | Mod: CPTII,S$GLB,, | Performed by: STUDENT IN AN ORGANIZED HEALTH CARE EDUCATION/TRAINING PROGRAM

## 2022-11-18 RX ORDER — DULOXETIN HYDROCHLORIDE 60 MG/1
60 CAPSULE, DELAYED RELEASE ORAL DAILY
Qty: 90 CAPSULE | Refills: 3 | Status: SHIPPED | OUTPATIENT
Start: 2022-11-18 | End: 2023-12-17

## 2022-11-18 NOTE — PROGRESS NOTES
Primary Care  Return/Acute Office Visit - In Person  11/18/2022  Mwengwe Ndhlovu      [facility]    Patient is a 37 y.o.   Geri Mosqueda  has a past medical history of Allergy, Blood clotting tendency, DVT (deep venous thrombosis) (2/11/2014), Follicular adenoma of thyroid gland (2/6/2014), Keloid cicatrix, and PE (pulmonary embolism) (2/11/2014).    Patient presents with   Chief Complaint   Patient presents with    Establish [facility]    Annual Exam     Patient presenting to establish care     She has no acute complaints today         Social History     Social History Narrative    Living with her boyfriend. She is from [place]. Her mother and step-father live in [facility]. She is a  and teaches voice in elementary. She attended school at [employer] and graduated from [employer]. 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      Geri Mosqueda family history includes Cancer in her brother; Colon cancer in her maternal grandmother; Depression in her mother; Diabetes in her mother; Heart attack in her maternal grandmother; Hypertension in her daughter, maternal grandmother, and mother.    Active Medications:  Review of patient's allergies indicates:   Allergen Reactions    Eggplant Anaphylaxis, Itching and Other (See Comments)    Percocet [oxycodone-acetaminophen]      Current Outpatient Medications   Medication Instructions    aspirin (ECOTRIN) 325 mg, Oral    capsaicin-menthol (SALONPAS, CAPSAICIN-MENTHOL,) 0.025-1.25 % PtMd 1 packet, Topical (Top), Every 12 hours    DULoxetine (CYMBALTA) 60 mg, Oral, Daily    levonorgestrel (MIRENA) 20 mcg/24 hr (5 years) IUD 1 Intra Uterine Device, Intrauterine, Once    multivitamin capsule 1 capsule, Oral, Daily    tacrolimus (PROTOPIC) 0.1 % ointment Topical (Top), 2 times daily       Review of Systems   Respiratory:  Positive for shortness of breath.    Psychiatric/Behavioral:  The patient is nervous/anxious.    All other systems reviewed and are  "negative.    Vitals:    11/18/22 1552   BP: 125/85   BP Location: Right arm   Patient Position: Sitting   BP Method: Medium (Manual)   Pulse: 79   Resp: 18   Temp: 98 °F (36.7 °C)   SpO2: 99%   Weight: 111.3 kg (245 lb 6 oz)   Height: 5' 11" (1.803 m)       Physical Exam  Vitals reviewed.   Constitutional:       General: She is not in acute distress.  Cardiovascular:      Rate and Rhythm: Normal rate and regular rhythm.      Pulses: Normal pulses.      Heart sounds: Normal heart sounds.   Pulmonary:      Effort: Pulmonary effort is normal.      Breath sounds: Normal breath sounds.   Abdominal:      General: Abdomen is flat. Bowel sounds are normal.      Palpations: Abdomen is soft.   Musculoskeletal:      Right lower leg: No edema.      Left lower leg: No edema.   Neurological:      General: No focal deficit present.      Mental Status: She is oriented to person, place, and time.        Assessment and Plan     Depression and anxiety   Depression symptoms well controlled. Patient sates that she struggles with anxiety.  Continue Cymbalta 60 mg daily   Patient to consider addition of Buspar     LAYNE   Patient reports using dental appliance   She was unable to tolerate CPAP due to claustrophobia     Hx of provoked PE     Hx PCOS     Routine labs   Orders Placed This Encounter   Procedures    TSH     Standing Status:   Future     Standing Expiration Date:   1/17/2024    Hemoglobin A1C     Standing Status:   Future     Standing Expiration Date:   1/17/2024    Lipid Panel     Standing Status:   Future     Standing Expiration Date:   1/17/2024    CBC Without Differential     Standing Status:   Future     Standing Expiration Date:   1/17/2024    BASIC METABOLIC PANEL     Standing Status:   Future     Standing Expiration Date:   1/17/2024           Upcoming Scheduled Appointments and Follow Up:    Future Appointments   Date Time Provider Department Center   11/21/2022  7:30 AM [facility] "   11/29/2022  2:45 PM Radha Musa MD [facility]   5/18/2023  4:00 PM Monae Chapin MD [facility]       Follow Up [facility] Care (with who? when?): Follow up in about 6 months (around 5/18/2023).      Extended Emergency Contact Information  Primary Emergency Contact: Annia Mcneill  Address: [address] [place] [address], [address] [place] [address] [place]  Mobile Phone: [phone]  Relation: Mother  Secondary Emergency Contact: FlorentinLatrice  Address: [address] [place] [address], [address]           [place] [address] [employer] [place]  Mobile Phone: [phone]  Relation: Roommate      Monae Chapin MD   Attending Physician  Primary Care  11/18/2022 - 3:58 PM    I spent a total of 27 minutes on the day of the visit.This includes face to face time and non-face to face time preparing to see the patient (eg, review of tests), obtaining and/or reviewing separately obtained history, documenting clinical information in the electronic or other health record, independently interpreting results and communicating results to the patient/family/caregiver, or care coordinator.

## 2022-11-22 ENCOUNTER — LAB VISIT (OUTPATIENT)
Dept: LAB | Facility: HOSPITAL | Age: 37
End: 2022-11-22
Attending: STUDENT IN AN ORGANIZED HEALTH CARE EDUCATION/TRAINING PROGRAM
Payer: COMMERCIAL

## 2022-11-22 DIAGNOSIS — Z00.00 ROUTINE CHECK-UP: ICD-10-CM

## 2022-11-22 LAB
ANION GAP SERPL CALC-SCNC: 7 MMOL/L (ref 8–16)
BUN SERPL-MCNC: 12 MG/DL (ref 6–20)
CALCIUM SERPL-MCNC: 9.9 MG/DL (ref 8.7–10.5)
CHLORIDE SERPL-SCNC: 104 MMOL/L (ref 95–110)
CHOLEST SERPL-MCNC: 193 MG/DL (ref 120–199)
CHOLEST/HDLC SERPL: 3.9 {RATIO} (ref 2–5)
CO2 SERPL-SCNC: 29 MMOL/L (ref 23–29)
CREAT SERPL-MCNC: 0.8 MG/DL (ref 0.5–1.4)
ERYTHROCYTE [DISTWIDTH] IN BLOOD BY AUTOMATED COUNT: 14 % (ref 11.5–14.5)
EST. GFR  (NO RACE VARIABLE): >60 ML/MIN/1.73 M^2
ESTIMATED AVG GLUCOSE: 105 MG/DL (ref 68–131)
GLUCOSE SERPL-MCNC: 92 MG/DL (ref 70–110)
HBA1C MFR BLD: 5.3 % (ref 4–5.6)
HCT VFR BLD AUTO: 43.7 % (ref 37–48.5)
HDLC SERPL-MCNC: 50 MG/DL (ref 40–75)
HDLC SERPL: 25.9 % (ref 20–50)
HGB BLD-MCNC: 14 G/DL (ref 12–16)
LDLC SERPL CALC-MCNC: 124.8 MG/DL (ref 63–159)
MCH RBC QN AUTO: 26.2 PG (ref 27–31)
MCHC RBC AUTO-ENTMCNC: 32 G/DL (ref 32–36)
MCV RBC AUTO: 82 FL (ref 82–98)
NONHDLC SERPL-MCNC: 143 MG/DL
PLATELET # BLD AUTO: 327 K/UL (ref 150–450)
PMV BLD AUTO: 10 FL (ref 9.2–12.9)
POTASSIUM SERPL-SCNC: 4.4 MMOL/L (ref 3.5–5.1)
RBC # BLD AUTO: 5.35 M/UL (ref 4–5.4)
SODIUM SERPL-SCNC: 140 MMOL/L (ref 136–145)
TRIGL SERPL-MCNC: 91 MG/DL (ref 30–150)
TSH SERPL DL<=0.005 MIU/L-ACNC: 2.05 UIU/ML (ref 0.4–4)
WBC # BLD AUTO: 7.15 K/UL (ref 3.9–12.7)

## 2022-11-22 PROCEDURE — 36415 COLL VENOUS BLD VENIPUNCTURE: CPT | Mod: PN | Performed by: STUDENT IN AN ORGANIZED HEALTH CARE EDUCATION/TRAINING PROGRAM

## 2022-11-22 PROCEDURE — 85027 COMPLETE CBC AUTOMATED: CPT | Performed by: STUDENT IN AN ORGANIZED HEALTH CARE EDUCATION/TRAINING PROGRAM

## 2022-11-22 PROCEDURE — 80048 BASIC METABOLIC PNL TOTAL CA: CPT | Performed by: STUDENT IN AN ORGANIZED HEALTH CARE EDUCATION/TRAINING PROGRAM

## 2022-11-22 PROCEDURE — 83036 HEMOGLOBIN GLYCOSYLATED A1C: CPT | Performed by: STUDENT IN AN ORGANIZED HEALTH CARE EDUCATION/TRAINING PROGRAM

## 2022-11-22 PROCEDURE — 84443 ASSAY THYROID STIM HORMONE: CPT | Performed by: STUDENT IN AN ORGANIZED HEALTH CARE EDUCATION/TRAINING PROGRAM

## 2022-11-22 PROCEDURE — 80061 LIPID PANEL: CPT | Performed by: STUDENT IN AN ORGANIZED HEALTH CARE EDUCATION/TRAINING PROGRAM

## 2022-12-01 ENCOUNTER — PATIENT MESSAGE (OUTPATIENT)
Dept: PRIMARY CARE CLINIC | Facility: CLINIC | Age: 37
End: 2022-12-01
Payer: COMMERCIAL

## 2022-12-01 RX ORDER — BUSPIRONE HYDROCHLORIDE 7.5 MG/1
7.5 TABLET ORAL 2 TIMES DAILY
Qty: 180 TABLET | Refills: 0 | Status: SHIPPED | OUTPATIENT
Start: 2022-12-01 | End: 2023-03-17

## 2022-12-01 NOTE — TELEPHONE ENCOUNTER
Patient agreeable to adding Buspar for treatment of depression and SHERRY.     Monae Chapin MD   Attending Physician  Primary Care  12/1/2022 - 10:42 AM

## 2022-12-08 ENCOUNTER — OFFICE VISIT (OUTPATIENT)
Dept: URGENT CARE | Facility: CLINIC | Age: 37
End: 2022-12-08
Payer: COMMERCIAL

## 2022-12-08 ENCOUNTER — PATIENT MESSAGE (OUTPATIENT)
Dept: ADMINISTRATIVE | Facility: OTHER | Age: 37
End: 2022-12-08
Payer: COMMERCIAL

## 2022-12-08 VITALS
BODY MASS INDEX: 34.72 KG/M2 | HEIGHT: 71 IN | HEART RATE: 84 BPM | OXYGEN SATURATION: 98 % | WEIGHT: 248 LBS | TEMPERATURE: 98 F | SYSTOLIC BLOOD PRESSURE: 121 MMHG | DIASTOLIC BLOOD PRESSURE: 77 MMHG | RESPIRATION RATE: 20 BRPM

## 2022-12-08 DIAGNOSIS — R05.9 COUGH, UNSPECIFIED TYPE: ICD-10-CM

## 2022-12-08 DIAGNOSIS — J06.9 VIRAL URI: Primary | ICD-10-CM

## 2022-12-08 LAB
CTP QC/QA: YES
CTP QC/QA: YES
POC MOLECULAR INFLUENZA A AGN: NEGATIVE
POC MOLECULAR INFLUENZA B AGN: NEGATIVE
SARS-COV-2 AG RESP QL IA.RAPID: NEGATIVE

## 2022-12-08 PROCEDURE — 3044F PR MOST RECENT HEMOGLOBIN A1C LEVEL <7.0%: ICD-10-PCS | Mod: CPTII,S$GLB,, | Performed by: PHYSICIAN ASSISTANT

## 2022-12-08 PROCEDURE — 87502 INFLUENZA DNA AMP PROBE: CPT | Mod: QW,S$GLB,, | Performed by: PHYSICIAN ASSISTANT

## 2022-12-08 PROCEDURE — 99213 OFFICE O/P EST LOW 20 MIN: CPT | Mod: S$GLB,,, | Performed by: PHYSICIAN ASSISTANT

## 2022-12-08 PROCEDURE — 1160F PR REVIEW ALL MEDS BY PRESCRIBER/CLIN PHARMACIST DOCUMENTED: ICD-10-PCS | Mod: CPTII,S$GLB,, | Performed by: PHYSICIAN ASSISTANT

## 2022-12-08 PROCEDURE — 87811 SARS-COV-2 COVID19 W/OPTIC: CPT | Mod: QW,S$GLB,, | Performed by: PHYSICIAN ASSISTANT

## 2022-12-08 PROCEDURE — 3008F PR BODY MASS INDEX (BMI) DOCUMENTED: ICD-10-PCS | Mod: CPTII,S$GLB,, | Performed by: PHYSICIAN ASSISTANT

## 2022-12-08 PROCEDURE — 1159F PR MEDICATION LIST DOCUMENTED IN MEDICAL RECORD: ICD-10-PCS | Mod: CPTII,S$GLB,, | Performed by: PHYSICIAN ASSISTANT

## 2022-12-08 PROCEDURE — 87502 POCT INFLUENZA A/B MOLECULAR: ICD-10-PCS | Mod: QW,S$GLB,, | Performed by: PHYSICIAN ASSISTANT

## 2022-12-08 PROCEDURE — 3078F PR MOST RECENT DIASTOLIC BLOOD PRESSURE < 80 MM HG: ICD-10-PCS | Mod: CPTII,S$GLB,, | Performed by: PHYSICIAN ASSISTANT

## 2022-12-08 PROCEDURE — 3074F PR MOST RECENT SYSTOLIC BLOOD PRESSURE < 130 MM HG: ICD-10-PCS | Mod: CPTII,S$GLB,, | Performed by: PHYSICIAN ASSISTANT

## 2022-12-08 PROCEDURE — 1160F RVW MEDS BY RX/DR IN RCRD: CPT | Mod: CPTII,S$GLB,, | Performed by: PHYSICIAN ASSISTANT

## 2022-12-08 PROCEDURE — 87811 SARS CORONAVIRUS 2 ANTIGEN POCT, MANUAL READ: ICD-10-PCS | Mod: QW,S$GLB,, | Performed by: PHYSICIAN ASSISTANT

## 2022-12-08 PROCEDURE — 1159F MED LIST DOCD IN RCRD: CPT | Mod: CPTII,S$GLB,, | Performed by: PHYSICIAN ASSISTANT

## 2022-12-08 PROCEDURE — 3044F HG A1C LEVEL LT 7.0%: CPT | Mod: CPTII,S$GLB,, | Performed by: PHYSICIAN ASSISTANT

## 2022-12-08 PROCEDURE — 3008F BODY MASS INDEX DOCD: CPT | Mod: CPTII,S$GLB,, | Performed by: PHYSICIAN ASSISTANT

## 2022-12-08 PROCEDURE — 3074F SYST BP LT 130 MM HG: CPT | Mod: CPTII,S$GLB,, | Performed by: PHYSICIAN ASSISTANT

## 2022-12-08 PROCEDURE — 3078F DIAST BP <80 MM HG: CPT | Mod: CPTII,S$GLB,, | Performed by: PHYSICIAN ASSISTANT

## 2022-12-08 PROCEDURE — 99213 PR OFFICE/OUTPT VISIT, EST, LEVL III, 20-29 MIN: ICD-10-PCS | Mod: S$GLB,,, | Performed by: PHYSICIAN ASSISTANT

## 2022-12-08 RX ORDER — ALBUTEROL SULFATE 90 UG/1
2 AEROSOL, METERED RESPIRATORY (INHALATION) EVERY 4 HOURS PRN
Qty: 18 G | Refills: 0 | Status: SHIPPED | OUTPATIENT
Start: 2022-12-08 | End: 2024-01-02

## 2022-12-08 NOTE — LETTER
December 8, 2022      Urgent Care [address] [address]  Phone: [phone]  Fax: [phone]       Patient: Geri Mosqueda   YOB: 1985  Date of Visit: 12/08/2022    To Whom It May Concern:    Vazquez Mosqueda  was at [facility] on 12/08/2022. The patient may return to work/school on Monday 12/12/2022 with no restrictions. If you have any questions or concerns, or if I can be of further assistance, please do not hesitate to contact me.    Sincerely,          Brendan Smith PA-C

## 2022-12-08 NOTE — PROGRESS NOTES
"Subjective:       Patient ID: Geri Mosqueda is a 37 y.o. female.    Vitals:  height is 5' 11" (1.803 m) and weight is 112.5 kg (248 lb). Her temperature is 98.1 °F (36.7 °C). Her blood pressure is 121/77 and her pulse is 84. Her respiration is 20 and oxygen saturation is 98%.     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 On Tuesday, her cough became productive with nasal congestion, body aches, and headache. Patient has 3/10 pain with headache. Patient has tried motrin, flonase, and cough medicine with mild relief. Patient did take an at home covid test last night which was negative, however she was recently exposed to flu and covid at work.     Cough  This is a new problem. The current episode started in the past 7 days. The problem has been unchanged. The problem occurs every few minutes. The cough is Productive of sputum. Associated symptoms include headaches, myalgias, nasal congestion and wheezing. Pertinent negatives include no chest pain, ear pain, fever, rash, sore throat or shortness of breath. Nothing aggravates the symptoms. She has tried OTC cough suppressant and rest for the symptoms. The treatment provided moderate relief.     Constitution: Negative for fever.   HENT:  Positive for congestion. Negative for ear pain and sore throat.    Neck: Negative for neck pain and painful lymph nodes.   Cardiovascular:  Negative for chest pain.   Respiratory:  Positive for cough and wheezing. Negative for shortness of breath.    Gastrointestinal:  Negative for abdominal pain, nausea, vomiting and diarrhea.   Musculoskeletal:  Positive for muscle ache.   Skin:  Negative for rash.   Neurological:  Positive for headaches.   Hematologic/Lymphatic: Negative for swollen lymph nodes.     Objective:      Physical Exam   Constitutional: She is oriented to person, place, and time. She appears well-developed. She is cooperative.  Non-toxic appearance. She " does not appear ill. No distress.      Comments:Patient coughing throughout exam.     HENT:   Head: Normocephalic and atraumatic.   Ears:   Right Ear: Hearing, tympanic membrane, external ear and ear canal normal.   Left Ear: Hearing, tympanic membrane, external ear and ear canal normal.   Nose: Mucosal edema and rhinorrhea present. No purulent discharge or nasal deformity. No epistaxis. Right sinus exhibits no maxillary sinus tenderness and no frontal sinus tenderness. Left sinus exhibits no maxillary sinus tenderness and no frontal sinus tenderness.   Mouth/Throat: Uvula is midline, oropharynx is clear and moist and mucous membranes are normal. No trismus in the jaw. Normal dentition. No uvula swelling. No oropharyngeal exudate, posterior oropharyngeal edema or posterior oropharyngeal erythema.   Eyes: Conjunctivae and lids are normal. No scleral icterus.   Neck: Trachea normal and phonation normal. Neck supple. No edema present. No erythema present. No neck rigidity present.   Cardiovascular: Normal rate, regular rhythm, normal heart sounds and normal pulses.   Pulmonary/Chest: Effort normal and breath sounds normal. No respiratory distress. She has no decreased breath sounds. She has no rhonchi.   Abdominal: Normal appearance.   Musculoskeletal: Normal range of motion.         General: No deformity. Normal range of motion.   Neurological: She is alert and oriented to person, place, and time. She exhibits normal muscle tone. Coordination normal.   Skin: Skin is warm, dry, intact, not diaphoretic and not pale.   Psychiatric: Her speech is normal and behavior is normal. Judgment and thought content normal.   Nursing note and vitals reviewed.        Results for orders placed or performed in visit on 12/08/22   POCT Influenza A/B MOLECULAR   Result Value Ref Range    POC Molecular Influenza A Ag Negative Negative, Not Reported    POC Molecular Influenza B Ag Negative Negative, Not Reported      Acceptable Yes    SARS Coronavirus 2 Antigen, POCT Manual Read   Result Value Ref Range    SARS Coronavirus 2 Antigen Negative Negative     Acceptable Yes        Assessment:       1. Viral URI    2. Cough, unspecified type          Plan:         Viral URI  -     albuterol (PROVENTIL/VENTOLIN HFA) 90 mcg/actuation inhaler; Inhale 2 puffs into the lungs every 4 (four) hours as needed for Wheezing or Shortness of Breath. Rescue  Dispense: 18 g; Refill: 0    Cough, unspecified type  -     POCT Influenza A/B MOLECULAR  -     SARS Coronavirus 2 Antigen, POCT Manual Read  -     albuterol (PROVENTIL/VENTOLIN HFA) 90 mcg/actuation inhaler; Inhale 2 puffs into the lungs every 4 (four) hours as needed for Wheezing or Shortness of Breath. Rescue  Dispense: 18 g; Refill: 0       Patient Instructions   Please drink plenty of fluids.    Please get plenty of rest.    Please return here or go to the Emergency Department for any concerns or worsening of condition.    If you were prescribed antibiotics, please take them to completion.    If you were prescribed a narcotic medication, do not drive or operate heavy equipment or machinery while taking these medications.    OVER THE COUNTER RECOMMENDATIONS/SUGGESTIONS.     Use Nasal Saline to mechanically move any post nasal drip from your eustachian tube or from the back of your throat.     Use warm salt water gargles to ease your throat pain. Warm salt water gargles as needed for sore throat-  1/2 tsp salt to 1 cup warm water, gargle as desired.     Use an antihistamine such as Claritin, Zyrtec or Allegra to dry you out.      Use pseudoephedrine (behind the counter) to decongest. Pseudoephedrine  30 mg up to 240 mg /day. It can raise your blood pressure and give you palpitations.    If you do not have Hypertension or any history of palpitations, it is ok to take over the counter Sudafed or Mucinex D or Allegra-D or Claritin-D or Zyrtec-D.  If you do take one of the above,  it is ok to combine that with plain over the counter Mucinex or Allegra or Claritin or Zyrtec.  If for example you are taking Zyrtec -D, you can combine that with Mucinex, but not Mucinex-D.  If you are taking Mucinex-D, you can combine that with plain Allegra or Claritin or Zyrtec.     If you do have Hypertension or palpitations, it is safe to take Coricidin HBP for relief of sinus symptoms.     Use Afrin in each nare for no longer than 3 days, as it is addictive. It can also dry out your mucous membranes and cause elevated blood pressure. This is especially useful if you are flying.     Use Flonase 1-2 sprays/nostril per day. It is a local acting steroid nasal spray, if you develop a bloody nose, stop using the medication immediately.     Sometimes Nyquil at night is beneficial to help you get some rest, however it is sedating and it does have an antihistamine, and tylenol.     Honey is a natural cough suppressant that can be used.     Tylenol up to 4,000 mg a day is safe for short periods and can be used for body aches, pain, and fever. However in high doses and prolonged use it can cause liver irritation.     Ibuprofen is a non-steroidal anti-inflammatory that can be used for body aches, pain, and fever.However it can also cause stomach irritation if over used.    If not allergic, please take over the counter Tylenol (Acetaminophen) and/or Motrin (Ibuprofen) as directed for control of pain and/or fever.    Please follow up with your primary care doctor or specialist as needed.    If you  smoke, please stop smoking.

## 2022-12-13 ENCOUNTER — NURSE TRIAGE (OUTPATIENT)
Dept: ADMINISTRATIVE | Facility: CLINIC | Age: 37
End: 2022-12-13
Payer: COMMERCIAL

## 2022-12-13 ENCOUNTER — HOSPITAL ENCOUNTER (EMERGENCY)
Facility: OTHER | Age: 37
Discharge: HOME | End: 2022-12-14
Attending: EMERGENCY MEDICINE
Payer: COMMERCIAL

## 2022-12-13 VITALS
TEMPERATURE: 98 F | SYSTOLIC BLOOD PRESSURE: 118 MMHG | HEIGHT: 71 IN | DIASTOLIC BLOOD PRESSURE: 70 MMHG | WEIGHT: 245 LBS | BODY MASS INDEX: 34.3 KG/M2 | OXYGEN SATURATION: 97 % | RESPIRATION RATE: 18 BRPM | HEART RATE: 83 BPM

## 2022-12-13 DIAGNOSIS — R06.02 SOB (SHORTNESS OF BREATH): ICD-10-CM

## 2022-12-13 DIAGNOSIS — B34.9 ACUTE VIRAL SYNDROME: ICD-10-CM

## 2022-12-13 DIAGNOSIS — N39.0 URINARY TRACT INFECTION WITH HEMATURIA, SITE UNSPECIFIED: Primary | ICD-10-CM

## 2022-12-13 DIAGNOSIS — R07.9 CHEST PAIN: ICD-10-CM

## 2022-12-13 DIAGNOSIS — R31.9 URINARY TRACT INFECTION WITH HEMATURIA, SITE UNSPECIFIED: Primary | ICD-10-CM

## 2022-12-13 LAB
ALBUMIN SERPL BCP-MCNC: 4 G/DL (ref 3.5–5.2)
ALP SERPL-CCNC: 78 U/L (ref 55–135)
ALT SERPL W/O P-5'-P-CCNC: 23 U/L (ref 10–44)
ANION GAP SERPL CALC-SCNC: 11 MMOL/L (ref 8–16)
AST SERPL-CCNC: 27 U/L (ref 10–40)
BACTERIA #/AREA URNS HPF: ABNORMAL /HPF
BASOPHILS # BLD AUTO: 0.06 K/UL (ref 0–0.2)
BASOPHILS NFR BLD: 0.8 % (ref 0–1.9)
BILIRUB SERPL-MCNC: 0.6 MG/DL (ref 0.1–1)
BILIRUB UR QL STRIP: NEGATIVE
BUN SERPL-MCNC: 10 MG/DL (ref 6–20)
CALCIUM SERPL-MCNC: 9.4 MG/DL (ref 8.7–10.5)
CHLORIDE SERPL-SCNC: 111 MMOL/L (ref 95–110)
CLARITY UR: ABNORMAL
CO2 SERPL-SCNC: 18 MMOL/L (ref 23–29)
COLOR UR: YELLOW
CREAT SERPL-MCNC: 0.8 MG/DL (ref 0.5–1.4)
DIFFERENTIAL METHOD: ABNORMAL
EOSINOPHIL # BLD AUTO: 0.1 K/UL (ref 0–0.5)
EOSINOPHIL NFR BLD: 2 % (ref 0–8)
ERYTHROCYTE [DISTWIDTH] IN BLOOD BY AUTOMATED COUNT: 13.2 % (ref 11.5–14.5)
EST. GFR  (NO RACE VARIABLE): >60 ML/MIN/1.73 M^2
GLUCOSE SERPL-MCNC: 83 MG/DL (ref 70–110)
GLUCOSE UR QL STRIP: NEGATIVE
HCT VFR BLD AUTO: 43.5 % (ref 37–48.5)
HCV AB SERPL QL IA: NEGATIVE
HGB BLD-MCNC: 15.1 G/DL (ref 12–16)
HGB UR QL STRIP: ABNORMAL
HIV 1+2 AB+HIV1 P24 AG SERPL QL IA: NEGATIVE
IMM GRANULOCYTES # BLD AUTO: 0.05 K/UL (ref 0–0.04)
IMM GRANULOCYTES NFR BLD AUTO: 0.7 % (ref 0–0.5)
KETONES UR QL STRIP: NEGATIVE
LEUKOCYTE ESTERASE UR QL STRIP: ABNORMAL
LYMPHOCYTES # BLD AUTO: 2.8 K/UL (ref 1–4.8)
LYMPHOCYTES NFR BLD: 39.5 % (ref 18–48)
MCH RBC QN AUTO: 26.7 PG (ref 27–31)
MCHC RBC AUTO-ENTMCNC: 34.7 G/DL (ref 32–36)
MCV RBC AUTO: 77 FL (ref 82–98)
MICROSCOPIC COMMENT: ABNORMAL
MONOCYTES # BLD AUTO: 0.8 K/UL (ref 0.3–1)
MONOCYTES NFR BLD: 11.6 % (ref 4–15)
NEUTROPHILS # BLD AUTO: 3.2 K/UL (ref 1.8–7.7)
NEUTROPHILS NFR BLD: 45.4 % (ref 38–73)
NITRITE UR QL STRIP: NEGATIVE
NRBC BLD-RTO: 0 /100 WBC
PH UR STRIP: 5 [PH] (ref 5–8)
PLATELET # BLD AUTO: 357 K/UL (ref 150–450)
PMV BLD AUTO: 9.3 FL (ref 9.2–12.9)
POTASSIUM SERPL-SCNC: 5.1 MMOL/L (ref 3.5–5.1)
PROT SERPL-MCNC: 7.8 G/DL (ref 6–8.4)
PROT UR QL STRIP: NEGATIVE
RBC # BLD AUTO: 5.66 M/UL (ref 4–5.4)
RBC #/AREA URNS HPF: >100 /HPF (ref 0–4)
SODIUM SERPL-SCNC: 140 MMOL/L (ref 136–145)
SP GR UR STRIP: 1.02 (ref 1–1.03)
SQUAMOUS #/AREA URNS HPF: 3 /HPF
TROPONIN I SERPL DL<=0.01 NG/ML-MCNC: <0.006 NG/ML (ref 0–0.03)
URN SPEC COLLECT METH UR: ABNORMAL
UROBILINOGEN UR STRIP-ACNC: NEGATIVE EU/DL
WBC # BLD AUTO: 7.07 K/UL (ref 3.9–12.7)
WBC #/AREA URNS HPF: 8 /HPF (ref 0–5)

## 2022-12-13 PROCEDURE — 96361 HYDRATE IV INFUSION ADD-ON: CPT

## 2022-12-13 PROCEDURE — 93005 ELECTROCARDIOGRAM TRACING: CPT

## 2022-12-13 PROCEDURE — 85025 COMPLETE CBC W/AUTO DIFF WBC: CPT | Performed by: PHYSICIAN ASSISTANT

## 2022-12-13 PROCEDURE — 93010 EKG 12-LEAD: ICD-10-PCS | Mod: ,,, | Performed by: INTERNAL MEDICINE

## 2022-12-13 PROCEDURE — 80053 COMPREHEN METABOLIC PANEL: CPT | Performed by: PHYSICIAN ASSISTANT

## 2022-12-13 PROCEDURE — 96374 THER/PROPH/DIAG INJ IV PUSH: CPT

## 2022-12-13 PROCEDURE — 99285 EMERGENCY DEPT VISIT HI MDM: CPT | Mod: 25

## 2022-12-13 PROCEDURE — 93010 ELECTROCARDIOGRAM REPORT: CPT | Mod: ,,, | Performed by: INTERNAL MEDICINE

## 2022-12-13 PROCEDURE — 84484 ASSAY OF TROPONIN QUANT: CPT | Performed by: PHYSICIAN ASSISTANT

## 2022-12-13 PROCEDURE — 25000003 PHARM REV CODE 250: Performed by: EMERGENCY MEDICINE

## 2022-12-13 PROCEDURE — 87389 HIV-1 AG W/HIV-1&-2 AB AG IA: CPT | Performed by: EMERGENCY MEDICINE

## 2022-12-13 PROCEDURE — 63600175 PHARM REV CODE 636 W HCPCS: Performed by: EMERGENCY MEDICINE

## 2022-12-13 PROCEDURE — 81000 URINALYSIS NONAUTO W/SCOPE: CPT | Performed by: PHYSICIAN ASSISTANT

## 2022-12-13 PROCEDURE — 86803 HEPATITIS C AB TEST: CPT | Performed by: EMERGENCY MEDICINE

## 2022-12-13 RX ORDER — NAPROXEN 500 MG/1
500 TABLET ORAL 2 TIMES DAILY WITH MEALS
Qty: 60 TABLET | Refills: 0 | Status: SHIPPED | OUTPATIENT
Start: 2022-12-13 | End: 2022-12-22

## 2022-12-13 RX ORDER — ACETAMINOPHEN 500 MG
1000 TABLET ORAL
Status: COMPLETED | OUTPATIENT
Start: 2022-12-13 | End: 2022-12-13

## 2022-12-13 RX ORDER — KETOROLAC TROMETHAMINE 30 MG/ML
30 INJECTION, SOLUTION INTRAMUSCULAR; INTRAVENOUS
Status: COMPLETED | OUTPATIENT
Start: 2022-12-13 | End: 2022-12-13

## 2022-12-13 RX ORDER — BENZONATATE 100 MG/1
100 CAPSULE ORAL 3 TIMES DAILY PRN
Qty: 20 CAPSULE | Refills: 0 | Status: SHIPPED | OUTPATIENT
Start: 2022-12-13 | End: 2022-12-23

## 2022-12-13 RX ORDER — CEPHALEXIN 500 MG/1
500 CAPSULE ORAL
Status: COMPLETED | OUTPATIENT
Start: 2022-12-13 | End: 2022-12-13

## 2022-12-13 RX ORDER — LORATADINE 10 MG/1
10 TABLET ORAL DAILY
Qty: 30 TABLET | Refills: 11 | Status: SHIPPED | OUTPATIENT
Start: 2022-12-13 | End: 2024-01-16

## 2022-12-13 RX ORDER — CEPHALEXIN 500 MG/1
500 CAPSULE ORAL 4 TIMES DAILY
Qty: 20 CAPSULE | Refills: 0 | Status: SHIPPED | OUTPATIENT
Start: 2022-12-13 | End: 2022-12-21 | Stop reason: ALTCHOICE

## 2022-12-13 RX ADMIN — ACETAMINOPHEN 1000 MG: 500 TABLET, FILM COATED ORAL at 11:12

## 2022-12-13 RX ADMIN — SODIUM CHLORIDE 1000 ML: 0.9 INJECTION, SOLUTION INTRAVENOUS at 10:12

## 2022-12-13 RX ADMIN — KETOROLAC TROMETHAMINE 30 MG: 30 INJECTION, SOLUTION INTRAMUSCULAR; INTRAVENOUS at 10:12

## 2022-12-13 RX ADMIN — CEPHALEXIN 500 MG: 500 CAPSULE ORAL at 11:12

## 2022-12-14 NOTE — ED TRIAGE NOTES
Pt c/o URI symptoms and chills. Pt was seen at urgent care and told to go to ED if symptoms worsens, states she is feeling worse. Resp even and unlabored, in no acute distress at this time.

## 2022-12-14 NOTE — TELEPHONE ENCOUNTER
Pt calls stating that she went to [facility] on Wednesday for cough and nasal congestion. Pt notes that today she feels worse with sxs of weakness, diarrhea, chills, and a continued cough.     Care Advice recommends that pt call  now. Pt states that she will not be calling 911 but is going to have her roommate take her to the ED. Pt is advised to call back with any new/worsening sxs, questions, or concerns; pt verbalizes understanding.   Reason for Disposition   Shock suspected (e.g., cold/pale/clammy skin, too weak to stand, low BP, rapid pulse)    Additional Information   Negative: SEVERE difficulty breathing (e.g., struggling for each breath, speaks in single words)   Negative: [1] Difficulty breathing or swallowing AND [2] started suddenly after medicine, an allergic food or bee sting    Protocols used: Dizziness - Ermjiucjwylbecs-I-EG

## 2022-12-14 NOTE — ED PROVIDER NOTES
"  Source of History:  Medical record, patient.    Chief complaint:  Per triage note: "Shortness of Breath (Reports SOB at rest and activity, chest discomfort, nasal, congestion , headache, bodyaches, diarrhea (6 episodes w/in 24 hrs) since last Monday. Seen at urgent care, dx with viral upper resp infx, given albuterol inhaler, viral panel negative at this visit. Symptoms continue to worsen)  "    HPI:    Geri Mosqueda is a 37 y.o. female,  who presents to the ED with chills, diarrhea, and generalized body aches for the past week. The patient states she initially developed dyspnea, headache, and cough around 8 days ago. She reports going to urgent care 6 days ago, being told she had a URI, and being discharged with a steroid inhaler. The patient also reports weakness and intermittent chest pain since this morning that she describes as a dull ache. She notes her chest pain and dyspnea are not brought on or worsened with exertion or inhalation. She denies any other alleviating or exacerbating factors. She notes she has had 6 episodes of diarrhea over the past day, though she denies any nausea. The patient reports a history of a left leg DVT that traveled to her lungs around 9 years ago after she had a thyroid operation.  She states she was placed on Coumadin and Lovenox afterwards for around 10 months. She denies any chance of pregnancy. She reports a known allergy to Percocet which she states causes her to itch.  Patient reports that the chest pain that she is experiencing now is different than the pleuritic pain she wants to experience where she had a  pulmonary embolism.  Patient's chest pain is worse with palpation and coughing.    This is the extent of the patient's complaints at this time.     ROS:   As per HPI and below:   General: No fever. +Chills.   HENT: No facial pain.   Eyes: No eye pain.   Cardiovascular: +Chest pain.    Respiratory:  +Dyspnea, +cough.   GI: No abdominal pain. No nausea. No " "vomiting. +Diarrhea.   Skin: No rashes.   Neuro:  No syncope.  No focal deficits. +Headache.    Musculoskeletal: No extremity pain. +Body aches.   All other systems reviewed and are negative.      Review of patient's allergies indicates:   Allergen Reactions    Eggplant Anaphylaxis, Itching and Other (See Comments)    Percocet [oxycodone-acetaminophen]        PMH:  As per HPI and below:  Past Medical History:   Diagnosis Date    Allergy     seasonal    Blood clotting tendency     DVT (deep venous thrombosis) 2/11/2014    after surgery    Follicular adenoma of thyroid gland 2/6/2014    Keloid cicatrix     PE (pulmonary embolism) 2/11/2014    after surgery       Past Surgical History:   Procedure Laterality Date    COLONOSCOPY N/A 7/30/2019    Procedure: COLONOSCOPY/Suprep;  Surgeon: Emre Carcamo MD;  Location: [facility];  Service: Endoscopy;  Laterality: N/A;    NASAL SEPTUM SURGERY  03/2019    Dr. Aceves    THYROID LOBECTOMY Left 2/6/2014    and isthmusectomy    TONSILLECTOMY         Social History     Tobacco Use    Smoking status: Never    Smokeless tobacco: Never   Substance Use Topics    Alcohol use: No    Drug use: No       Physical Exam:      Nursing note and vitals reviewed.  Blood Pressure 137/85   Pulse (Abnormal) 113   Temperature 98.2 °F (36.8 °C) (Oral)   Respiration 20   Height 5' 11" (1.803 m)   Weight 111.1 kg (245 lb)   Oxygen Saturation 96%   Body Mass Index 34.17 kg/m²     Physical Exam  Vitals and nursing note reviewed.   Constitutional:       General: She is not in acute distress.     Appearance: She is well-developed. She is not ill-appearing.      Interventions: She is not intubated.  HENT:      Head: Normocephalic and atraumatic.   Eyes:      Extraocular Movements: Extraocular movements intact.      Pupils: Pupils are equal, round, and reactive to light.   Neck:      Thyroid: No thyromegaly.   Cardiovascular:      Rate and Rhythm: Normal rate and regular rhythm.   Pulmonary:      " Effort: Pulmonary effort is normal. No tachypnea, bradypnea, accessory muscle usage or respiratory distress. She is not intubated.      Breath sounds: Normal breath sounds. No stridor.   Musculoskeletal:      Cervical back: Normal range of motion.   Lymphadenopathy:      Cervical: No cervical adenopathy.   Skin:     General: Skin is warm.   Neurological:      General: No focal deficit present.      Mental Status: She is alert and oriented to person, place, and time.       Procedures       EKG:  EKG interpreted independent Cardiology.  Sinus tachycardia rate 104 beats per minute normal axis normal intervals no ST changes no ectopy.  X-Ray:      MDM:    I decided to obtain the patient's medical records.    Labs Reviewed    No visits with results within 1 Day(s) from this visit.   Latest known visit with results is:   Office Visit on 12/08/2022   Component Date Value Ref Range Status    POC Molecular Influenza A Ag 12/08/2022 Negative  Negative, Not Reported Final    POC Molecular Influenza B Ag 12/08/2022 Negative  Negative, Not Reported Final     Acceptable 12/08/2022 Yes   Final    SARS Coronavirus 2 Antigen 12/08/2022 Negative  Negative Final     Acceptable 12/08/2022 Yes   Final        Imaging Reviewed    Imaging Results              X-Ray Chest PA And Lateral (Final result)  Result time 12/13/22 21:25:11      Final result by Sheila Lee MD (12/13/22 21:25:11)               Impression:      No acute cardiopulmonary process identified.      Electronically signed by: Sheila Lee MD  Date:    12/13/2022  Time:    21:25             Narrative:    EXAMINATION:  XR CHEST PA AND LATERAL    CLINICAL HISTORY:  Shortness of breath    TECHNIQUE:  PA and lateral views of the chest were performed.    COMPARISON:  May 2021.    FINDINGS:  Cardiac silhouette is normal in size.  Lungs are symmetrically expanded.  No evidence of focal consolidative process, pneumothorax, or significant pleural  effusion.  No acute osseous abnormality identified.                                    Medications given in ED         Medications   sodium chloride 0.9% bolus 1,000 mL 1,000 mL (1,000 mLs Intravenous New Bag 12/13/22 7535)   ketorolac injection 30 mg (has no administration in time range)              I, Brendan Cerrato, scribed for, and in the presence of, Dr. Cotter. I performed the scribed service and the documentation accurately describes the services I performed. I attest to the accuracy of the note.     Physician Attestation for Scribe:   I, Eber Cotter MD, reviewed documentation as scribed in my presence, which is both accurate and complete.    Diagnostic Impression:    1. SOB (shortness of breath)    2. Chest pain               Eber Cotter MD  12/14/22 0757

## 2022-12-14 NOTE — FIRST PROVIDER EVALUATION
Emergency Department TeleTriage Encounter Note      CHIEF COMPLAINT    Chief Complaint   Patient presents with    Shortness of Breath     Reports SOB at rest and activity, chest discomfort, nasal, congestion , headache, bodyaches, diarrhea (6 episodes w/in 24 hrs) since last Monday. Seen at urgent care, dx with viral upper resp infx, given albuterol inhaler, viral panel negative at this visit. Symptoms continue to worsen       VITAL SIGNS   Initial Vitals [12/13/22 2023]   BP Pulse Resp Temp SpO2   137/85 (!) 113 20 98.2 °F (36.8 °C) 96 %      MAP       --            ALLERGIES    Review of patient's allergies indicates:   Allergen Reactions    Eggplant Anaphylaxis, Itching and Other (See Comments)    Percocet [oxycodone-acetaminophen]        PROVIDER TRIAGE NOTE  Otherwise healthy well-appearing 37-year-old female presents with multiple complaints.  Patient presents with diarrhea for the past couple of days.  Shortness of breath with activity and at rest.  Chest discomfort generalized malaise chest congestion.  Vital signs reveal tachycardia 113, otherwise no acute findings noted.      ORDERS  Labs Reviewed   HIV 1 / 2 ANTIBODY   HEPATITIS C ANTIBODY   URINALYSIS, REFLEX TO URINE CULTURE   CBC W/ AUTO DIFFERENTIAL   COMPREHENSIVE METABOLIC PANEL   TROPONIN I       ED Orders (720h ago, onward)      Start Ordered     Status Ordering Provider    12/13/22 2042 12/13/22 2041  X-Ray Chest PA And Lateral  1 time imaging         Ordered JD RAMSEY    12/13/22 2042 12/13/22 2041  EKG 12-lead  Once         Ordered JD RAMSEY    12/13/22 2042 12/13/22 2041  Urinalysis, Reflex to Urine Culture  STAT         Ordered JD RAMSEY    12/13/22 2042 12/13/22 2041  Saline lock IV  Once         Ordered JD RAMSEY    12/13/22 2042 12/13/22 2041  CBC Auto Differential  STAT         Ordered JD RAMESY    12/13/22 2042 12/13/22 2041  Comprehensive Metabolic Panel  STAT         Ordered JD RAMSEY     12/13/22 2042 12/13/22 2041  Troponin I  STAT         Ordered JD RAMSEY.    12/13/22 2027 12/13/22 2026  HIV 1/2 Ag/Ab (4th Gen)  STAT         Ordered EFFIE PETTY.    12/13/22 2027 12/13/22 2026  Hepatitis C Antibody  STAT         Ordered EFFIE PETTY.              Virtual Visit Note: The provider triage portion of this emergency department evaluation and documentation was performed via [employer], a HIPAA-compliant telemedicine application, in concert with a tele-presenter in the room. A face to face patient evaluation with one of my colleagues will occur once the patient is placed in an emergency department room.      DISCLAIMER: This note was prepared with [employer] voice recognition transcription software. Garbled syntax, mangled pronouns, and other bizarre constructions may be attributed to that software system.

## 2022-12-21 ENCOUNTER — OFFICE VISIT (OUTPATIENT)
Dept: PRIMARY CARE CLINIC | Facility: CLINIC | Age: 37
End: 2022-12-21
Payer: COMMERCIAL

## 2022-12-21 VITALS
TEMPERATURE: 98 F | HEART RATE: 76 BPM | SYSTOLIC BLOOD PRESSURE: 118 MMHG | HEIGHT: 71 IN | BODY MASS INDEX: 33.02 KG/M2 | DIASTOLIC BLOOD PRESSURE: 74 MMHG | OXYGEN SATURATION: 99 % | WEIGHT: 235.88 LBS

## 2022-12-21 DIAGNOSIS — M54.9 BACK PAIN, UNSPECIFIED BACK LOCATION, UNSPECIFIED BACK PAIN LATERALITY, UNSPECIFIED CHRONICITY: Primary | ICD-10-CM

## 2022-12-21 DIAGNOSIS — M41.9 SCOLIOSIS, UNSPECIFIED SCOLIOSIS TYPE, UNSPECIFIED SPINAL REGION: Primary | ICD-10-CM

## 2022-12-21 DIAGNOSIS — M54.9 CHRONIC MIDLINE BACK PAIN, UNSPECIFIED BACK LOCATION: ICD-10-CM

## 2022-12-21 DIAGNOSIS — G89.29 CHRONIC MIDLINE BACK PAIN, UNSPECIFIED BACK LOCATION: ICD-10-CM

## 2022-12-21 PROCEDURE — 1159F PR MEDICATION LIST DOCUMENTED IN MEDICAL RECORD: ICD-10-PCS | Mod: CPTII,S$GLB,, | Performed by: STUDENT IN AN ORGANIZED HEALTH CARE EDUCATION/TRAINING PROGRAM

## 2022-12-21 PROCEDURE — 3008F BODY MASS INDEX DOCD: CPT | Mod: CPTII,S$GLB,, | Performed by: STUDENT IN AN ORGANIZED HEALTH CARE EDUCATION/TRAINING PROGRAM

## 2022-12-21 PROCEDURE — 3044F PR MOST RECENT HEMOGLOBIN A1C LEVEL <7.0%: ICD-10-PCS | Mod: CPTII,S$GLB,, | Performed by: STUDENT IN AN ORGANIZED HEALTH CARE EDUCATION/TRAINING PROGRAM

## 2022-12-21 PROCEDURE — 1160F PR REVIEW ALL MEDS BY PRESCRIBER/CLIN PHARMACIST DOCUMENTED: ICD-10-PCS | Mod: CPTII,S$GLB,, | Performed by: STUDENT IN AN ORGANIZED HEALTH CARE EDUCATION/TRAINING PROGRAM

## 2022-12-21 PROCEDURE — 3074F SYST BP LT 130 MM HG: CPT | Mod: CPTII,S$GLB,, | Performed by: STUDENT IN AN ORGANIZED HEALTH CARE EDUCATION/TRAINING PROGRAM

## 2022-12-21 PROCEDURE — 99999 PR PBB SHADOW E&M-EST. PATIENT-LVL V: ICD-10-PCS | Mod: PBBFAC,,, | Performed by: STUDENT IN AN ORGANIZED HEALTH CARE EDUCATION/TRAINING PROGRAM

## 2022-12-21 PROCEDURE — 1160F RVW MEDS BY RX/DR IN RCRD: CPT | Mod: CPTII,S$GLB,, | Performed by: STUDENT IN AN ORGANIZED HEALTH CARE EDUCATION/TRAINING PROGRAM

## 2022-12-21 PROCEDURE — 99213 PR OFFICE/OUTPT VISIT, EST, LEVL III, 20-29 MIN: ICD-10-PCS | Mod: S$GLB,,, | Performed by: STUDENT IN AN ORGANIZED HEALTH CARE EDUCATION/TRAINING PROGRAM

## 2022-12-21 PROCEDURE — 3074F PR MOST RECENT SYSTOLIC BLOOD PRESSURE < 130 MM HG: ICD-10-PCS | Mod: CPTII,S$GLB,, | Performed by: STUDENT IN AN ORGANIZED HEALTH CARE EDUCATION/TRAINING PROGRAM

## 2022-12-21 PROCEDURE — 3078F PR MOST RECENT DIASTOLIC BLOOD PRESSURE < 80 MM HG: ICD-10-PCS | Mod: CPTII,S$GLB,, | Performed by: STUDENT IN AN ORGANIZED HEALTH CARE EDUCATION/TRAINING PROGRAM

## 2022-12-21 PROCEDURE — 99999 PR PBB SHADOW E&M-EST. PATIENT-LVL V: CPT | Mod: PBBFAC,,, | Performed by: STUDENT IN AN ORGANIZED HEALTH CARE EDUCATION/TRAINING PROGRAM

## 2022-12-21 PROCEDURE — 3078F DIAST BP <80 MM HG: CPT | Mod: CPTII,S$GLB,, | Performed by: STUDENT IN AN ORGANIZED HEALTH CARE EDUCATION/TRAINING PROGRAM

## 2022-12-21 PROCEDURE — 1159F MED LIST DOCD IN RCRD: CPT | Mod: CPTII,S$GLB,, | Performed by: STUDENT IN AN ORGANIZED HEALTH CARE EDUCATION/TRAINING PROGRAM

## 2022-12-21 PROCEDURE — 3044F HG A1C LEVEL LT 7.0%: CPT | Mod: CPTII,S$GLB,, | Performed by: STUDENT IN AN ORGANIZED HEALTH CARE EDUCATION/TRAINING PROGRAM

## 2022-12-21 PROCEDURE — 99213 OFFICE O/P EST LOW 20 MIN: CPT | Mod: S$GLB,,, | Performed by: STUDENT IN AN ORGANIZED HEALTH CARE EDUCATION/TRAINING PROGRAM

## 2022-12-21 PROCEDURE — 3008F PR BODY MASS INDEX (BMI) DOCUMENTED: ICD-10-PCS | Mod: CPTII,S$GLB,, | Performed by: STUDENT IN AN ORGANIZED HEALTH CARE EDUCATION/TRAINING PROGRAM

## 2022-12-21 RX ORDER — FLUCONAZOLE 150 MG/1
150 TABLET ORAL DAILY PRN
Qty: 1 TABLET | Refills: 1 | Status: SHIPPED | OUTPATIENT
Start: 2022-12-21 | End: 2024-03-20

## 2022-12-21 RX ORDER — PROMETHAZINE HYDROCHLORIDE 6.25 MG/5ML
SYRUP ORAL
COMMUNITY
Start: 2022-07-11 | End: 2023-06-08 | Stop reason: ALTCHOICE

## 2022-12-21 RX ORDER — CYCLOBENZAPRINE HCL 10 MG
10 TABLET ORAL DAILY
COMMUNITY
Start: 2022-11-02 | End: 2023-09-06

## 2022-12-21 RX ORDER — IBUPROFEN 800 MG/1
800 TABLET ORAL 2 TIMES DAILY PRN
COMMUNITY
Start: 2022-11-02 | End: 2024-03-20

## 2022-12-21 RX ORDER — DICLOFENAC SODIUM 10 MG/G
2 GEL TOPICAL DAILY
Qty: 50 G | Refills: 1 | Status: SHIPPED | OUTPATIENT
Start: 2022-12-21 | End: 2024-01-16

## 2022-12-21 NOTE — PROGRESS NOTES
DATE: 12/22/2022  PATIENT: Geri Mosqueda    Supervising Physician: Wesley Heredia M.D.    CHIEF COMPLAINT: mid/low back pain    HISTORY:  Geri Mosqueda is a 37 y.o. female with a pmhx of scoliosis here for initial evaluation of mid/low back and left leg pain (Back - 7, Leg - 7).  The pain in the lower back is what bothers her most.  The pain has been present for years, worsening in the last few months. Pt says as an adolescent she was diagnosed with scoliosis and did some chiropractic rx but no PT, bracing, or surgery. She says for years she has had chronic mid/low back pain. The patient describes the pain as aching, sharp, and burning with some new pain shooting down the back of her left leg.  The pain is worse with activity and improved by nothing. There is positive associated numbness and tingling in both legs. There is negative subjective weakness. Prior treatments have included chiropractic rx, but no PT, ESIs, surgery.    The patient denies myelopathic symptoms such as handwriting changes or difficulty with buttons/coins/keys. Denies perineal paresthesias, bowel/bladder dysfunction.    PAST MEDICAL/SURGICAL HISTORY:  Past Medical History:   Diagnosis Date    Allergy     seasonal    Blood clotting tendency     DVT (deep venous thrombosis) 2/11/2014    after surgery    Follicular adenoma of thyroid gland 2/6/2014    Keloid cicatrix     PE (pulmonary embolism) 2/11/2014    after surgery     Past Surgical History:   Procedure Laterality Date    COLONOSCOPY N/A 7/30/2019    Procedure: COLONOSCOPY/Suprep;  Surgeon: Emre Carcamo MD;  Location: [facility];  Service: Endoscopy;  Laterality: N/A;    NASAL SEPTUM SURGERY  03/2019    Dr. Aceves    THYROID LOBECTOMY Left 2/6/2014    and isthmusectomy    TONSILLECTOMY         Medications:   Current Outpatient Medications on File Prior to Visit   Medication Sig Dispense Refill    albuterol (PROVENTIL/VENTOLIN HFA) 90 mcg/actuation inhaler Inhale 2  puffs into the lungs every 4 (four) hours as needed for Wheezing or Shortness of Breath. Rescue 18 g 0    benzonatate (TESSALON) 100 MG capsule Take 1 capsule (100 mg total) by mouth 3 (three) times daily as needed for Cough. 20 capsule 0    busPIRone (BUSPAR) 7.5 MG tablet Take 1 tablet (7.5 mg total) by mouth 2 (two) times daily. 180 tablet 0    cyclobenzaprine (FLEXERIL) 10 MG tablet Take 10 mg by mouth once daily.      diclofenac sodium (VOLTAREN) 1 % Gel Apply 2 g topically once daily. 50 g 1    DULoxetine (CYMBALTA) 60 MG capsule Take 1 capsule (60 mg total) by mouth once daily. 90 capsule 3    fluconazole (DIFLUCAN) 150 MG Tab Take 1 tablet (150 mg total) by mouth daily as needed (yeast infection). 1 tablet 1    ibuprofen (ADVIL,MOTRIN) 800 MG tablet Take 800 mg by mouth 2 (two) times daily as needed.      loratadine (CLARITIN) 10 mg tablet Take 1 tablet (10 mg total) by mouth once daily. 30 tablet 11    multivitamin capsule Take 1 capsule by mouth once daily.      promethazine (PHENERGAN) 6.25 mg/5 mL syrup TAKE 5 TO 10 ML BY MOUTH EVERY 6 HOURS AS NEEDED FOR NAUSEA OR COUGH      tacrolimus (PROTOPIC) 0.1 % ointment Apply topically 2 (two) times daily. 100 g 2    [DISCONTINUED] naproxen (NAPROSYN) 500 MG tablet Take 1 tablet (500 mg total) by mouth 2 (two) times daily with meals. 60 tablet 0    aspirin (ECOTRIN) 325 MG EC tablet Take 325 mg by mouth.      levonorgestrel (MIRENA) 20 mcg/24 hr (5 years) IUD 1 Intra Uterine Device by Intrauterine route once. for 1 dose 1 Intra Uterine Device 0     No current facility-administered medications on file prior to visit.       Social History:   Social History     Socioeconomic History    Marital status: Single   Occupational History     Employer: [employer]   Tobacco Use    Smoking status: Some Days     Types: Cigars    Smokeless tobacco: Never   Substance and Sexual Activity    Alcohol use: No    Drug use: No    Sexual activity: Yes     Partners: Male  "    Birth control/protection: I.U.D.   Social History Narrative    Living with her boyfriend. She is from [place]. Her mother and step-father live in [place] [facility]. She is a  and teaches voice in elementary. She attended school at [employer] and graduated from [employer]. 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        REVIEW OF SYSTEMS:  Constitution: Negative. Negative for chills, fever and night sweats.   Cardiovascular: Negative for chest pain and syncope.   Respiratory: Negative for cough and shortness of breath.   Gastrointestinal: See HPI. Negative for nausea/vomiting. Negative for abdominal pain.  Genitourinary: See HPI. Negative for discoloration or dysuria.  Skin: Negative for dry skin, itching and rash.   Hematologic/Lymphatic: Negative for bleeding problem. Does not bruise/bleed easily.   Musculoskeletal: Negative for falls and muscle weakness.   Neurological: See HPI. No seizures.   Endocrine: Negative for polydipsia, polyphagia and polyuria.   Allergic/Immunologic: Negative for hives and persistent infections.     EXAM:  Ht 5' 11" (1.803 m)   Wt 107.5 kg (236 lb 14.2 oz)   BMI 33.04 kg/m²     General: The patient is a very pleasant 37 y.o. female in no apparent distress, the patient is oriented to person, place and time.  Psych: Normal mood and affect  HEENT: Vision grossly intact, hearing intact to the spoken word.  Lungs: Respirations unlabored.  Gait: Normal station and gait, no difficulty with toe or heel walk.   Skin: Dorsal lumbar skin negative for rashes, lesions, hairy patches and surgical scars. There is mild generalized lumbar tenderness to palpation.  Range of motion: Lumbar range of motion is acceptable.  Spinal Balance: Global saggital and coronal spinal balance acceptable, not significant for scoliosis and kyphosis.  Musculoskeletal: No pain with the range of motion of the bilateral hips. No trochanteric tenderness to palpation.  Vascular: Bilateral lower extremities warm and " well perfused, dorsalis pedis pulses 2+ bilaterally.  Neurological: Normal strength and tone in all major motor groups in the bilateral lower extremities. Normal sensation to light touch in the L2-S1 dermatomes bilaterally.  Deep tendon reflexes symmetric 2+ in the bilateral lower extremities.  Negative Babinski bilaterally. Straight leg raise negative bilaterally.    IMAGING:      Today I personally reviewed AP, Lat scoli films that demonstrate  minimal lower thoracic levocurvature and lumbar dextrocurvature. Reversal of normal cervical lordosis. Mild disc space narrowing at L5-S1.     Body mass index is 33.04 kg/m².    Hemoglobin A1C   Date Value Ref Range Status   11/22/2022 5.3 4.0 - 5.6 % Final     Comment:     ADA Screening Guidelines:  5.7-6.4%  Consistent with prediabetes  >or=6.5%  Consistent with diabetes    High levels of fetal hemoglobin interfere with the HbA1C  assay. Heterozygous hemoglobin variants (HbS, HgC, etc)do  not significantly interfere with this assay.   However, presence of multiple variants may affect accuracy.     05/12/2018 5.3 4.0 - 5.6 % Final     Comment:     According to ADA guidelines, hemoglobin A1c <7.0% represents  optimal control in non-pregnant diabetic patients. Different  metrics may apply to specific patient populations.   Standards of Medical Care in Diabetes-2016.  For the purpose of screening for the presence of diabetes:  <5.7%     Consistent with the absence of diabetes  5.7-6.4%  Consistent with increasing risk for diabetes   (prediabetes)  >or=6.5%  Consistent with diabetes  Currently, no consensus exists for use of hemoglobin A1c  for diagnosis of diabetes for children.  This Hemoglobin A1c assay has significant interference with fetal   hemoglobin   (HbF). The results are invalid for patients with abnormal amounts of   HbF,   including those with known Hereditary Persistence   of Fetal Hemoglobin. Heterozygous hemoglobin variants (HbAS, HbAC,   HbAD, HbAE, HbA2) do  not significantly interfere with this assay;   however, presence of multiple variants in a sample may impact the %   interference.     03/27/2017 5.3 4.5 - 6.2 % Final     Comment:     According to ADA guidelines, hemoglobin A1C <7.0% represents  optimal control in non-pregnant diabetic patients.  Different  metrics may apply to specific populations.   Standards of Medical Care in Diabetes - 2016.  For the purpose of screening for the presence of diabetes:  <5.7%     Consistent with the absence of diabetes  5.7-6.4%  Consistent with increasing risk for diabetes   (prediabetes)  >or=6.5%  Consistent with diabetes  Currently no consensus exists for use of hemoglobin A1C  for diagnosis of diabetes for children.             ASSESSMENT/PLAN:    Geri was seen today for mid-back pain and low-back pain.    Diagnoses and all orders for this visit:    Chronic midline back pain, unspecified back location  -     Ambulatory referral/consult to Orthopedics    Other orders  -     meloxicam (MOBIC) 15 MG tablet; Take 1 tablet (15 mg total) by mouth once daily.  -     methocarbamoL (ROBAXIN) 750 MG Tab; Take 1 tablet (750 mg total) by mouth nightly as needed.        Today we discussed at length all of the different treatment options including anti-inflammatories, acetaminophen, rest, ice, heat, physical therapy including strengthening and stretching exercises, home exercises, ROM, aerobic conditioning, aqua therapy, other modalities including ultrasound, massage, and dry needling, epidural steroid injections and finally surgical intervention.      Pt presents with chronic mid/low back pain with radiculopathy. Will schedule PT eval at [facility] and send mobic and robaxin to pharmacy. Pt will fu if pain persists.
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
## 2022-12-22 ENCOUNTER — OFFICE VISIT (OUTPATIENT)
Dept: ORTHOPEDICS | Facility: CLINIC | Age: 37
End: 2022-12-22
Payer: COMMERCIAL

## 2022-12-22 ENCOUNTER — HOSPITAL ENCOUNTER (OUTPATIENT)
Dept: RADIOLOGY | Facility: HOSPITAL | Age: 37
Discharge: HOME | End: 2022-12-22
Attending: ORTHOPAEDIC SURGERY
Payer: COMMERCIAL

## 2022-12-22 VITALS — BODY MASS INDEX: 33.16 KG/M2 | WEIGHT: 236.88 LBS | HEIGHT: 71 IN

## 2022-12-22 DIAGNOSIS — G89.29 CHRONIC MIDLINE BACK PAIN, UNSPECIFIED BACK LOCATION: ICD-10-CM

## 2022-12-22 DIAGNOSIS — M54.9 CHRONIC MIDLINE BACK PAIN, UNSPECIFIED BACK LOCATION: ICD-10-CM

## 2022-12-22 DIAGNOSIS — M41.9 SCOLIOSIS, UNSPECIFIED SCOLIOSIS TYPE, UNSPECIFIED SPINAL REGION: ICD-10-CM

## 2022-12-22 PROCEDURE — 72082 X-RAY EXAM ENTIRE SPI 2/3 VW: CPT | Mod: 26,,, | Performed by: RADIOLOGY

## 2022-12-22 PROCEDURE — 3044F PR MOST RECENT HEMOGLOBIN A1C LEVEL <7.0%: ICD-10-PCS | Mod: CPTII,S$GLB,, | Performed by: ORTHOPAEDIC SURGERY

## 2022-12-22 PROCEDURE — 99214 PR OFFICE/OUTPT VISIT, EST, LEVL IV, 30-39 MIN: ICD-10-PCS | Mod: S$GLB,,, | Performed by: ORTHOPAEDIC SURGERY

## 2022-12-22 PROCEDURE — 99999 PR PBB SHADOW E&M-EST. PATIENT-LVL III: CPT | Mod: PBBFAC,,, | Performed by: ORTHOPAEDIC SURGERY

## 2022-12-22 PROCEDURE — 1159F PR MEDICATION LIST DOCUMENTED IN MEDICAL RECORD: ICD-10-PCS | Mod: CPTII,S$GLB,, | Performed by: ORTHOPAEDIC SURGERY

## 2022-12-22 PROCEDURE — 99214 OFFICE O/P EST MOD 30 MIN: CPT | Mod: S$GLB,,, | Performed by: ORTHOPAEDIC SURGERY

## 2022-12-22 PROCEDURE — 3008F PR BODY MASS INDEX (BMI) DOCUMENTED: ICD-10-PCS | Mod: CPTII,S$GLB,, | Performed by: ORTHOPAEDIC SURGERY

## 2022-12-22 PROCEDURE — 3044F HG A1C LEVEL LT 7.0%: CPT | Mod: CPTII,S$GLB,, | Performed by: ORTHOPAEDIC SURGERY

## 2022-12-22 PROCEDURE — 72082 XR SCOLIOSIS COMPLETE: ICD-10-PCS | Mod: 26,,, | Performed by: RADIOLOGY

## 2022-12-22 PROCEDURE — 99999 PR PBB SHADOW E&M-EST. PATIENT-LVL III: ICD-10-PCS | Mod: PBBFAC,,, | Performed by: ORTHOPAEDIC SURGERY

## 2022-12-22 PROCEDURE — 72082 X-RAY EXAM ENTIRE SPI 2/3 VW: CPT | Mod: TC

## 2022-12-22 PROCEDURE — 3008F BODY MASS INDEX DOCD: CPT | Mod: CPTII,S$GLB,, | Performed by: ORTHOPAEDIC SURGERY

## 2022-12-22 PROCEDURE — 1159F MED LIST DOCD IN RCRD: CPT | Mod: CPTII,S$GLB,, | Performed by: ORTHOPAEDIC SURGERY

## 2022-12-22 RX ORDER — METHOCARBAMOL 750 MG/1
750 TABLET, FILM COATED ORAL NIGHTLY PRN
Qty: 90 TABLET | Refills: 0 | Status: SHIPPED | OUTPATIENT
Start: 2022-12-22 | End: 2023-01-21

## 2022-12-22 RX ORDER — MELOXICAM 15 MG/1
15 TABLET ORAL DAILY
Qty: 30 TABLET | Refills: 0 | Status: SHIPPED | OUTPATIENT
Start: 2022-12-22

## 2023-01-19 ENCOUNTER — CLINICAL SUPPORT (OUTPATIENT)
Dept: REHABILITATION | Facility: HOSPITAL | Age: 38
End: 2023-01-19
Payer: COMMERCIAL

## 2023-01-19 DIAGNOSIS — G89.29 CHRONIC MIDLINE BACK PAIN, UNSPECIFIED BACK LOCATION: ICD-10-CM

## 2023-01-19 DIAGNOSIS — M54.9 CHRONIC MIDLINE BACK PAIN, UNSPECIFIED BACK LOCATION: ICD-10-CM

## 2023-01-19 DIAGNOSIS — M54.50 CHRONIC LEFT-SIDED LOW BACK PAIN WITHOUT SCIATICA: ICD-10-CM

## 2023-01-19 DIAGNOSIS — M25.69 BACK STIFFNESS: ICD-10-CM

## 2023-01-19 DIAGNOSIS — R29.898 LEG WEAKNESS, BILATERAL: ICD-10-CM

## 2023-01-19 DIAGNOSIS — G89.29 CHRONIC LEFT-SIDED LOW BACK PAIN WITHOUT SCIATICA: ICD-10-CM

## 2023-01-19 PROCEDURE — 97162 PT EVAL MOD COMPLEX 30 MIN: CPT | Mod: 97

## 2023-01-19 PROCEDURE — 97110 THERAPEUTIC EXERCISES: CPT

## 2023-01-19 NOTE — PROGRESS NOTES
See evaluation in POC for goals and assessment     Eval Date: 01/23/2023    Sandra Velasquez, PT, DPT, CLT

## 2023-01-23 PROBLEM — G89.29 CHRONIC LEFT-SIDED LOW BACK PAIN: Status: ACTIVE | Noted: 2023-01-23

## 2023-01-23 PROBLEM — M54.50 CHRONIC LEFT-SIDED LOW BACK PAIN: Status: ACTIVE | Noted: 2023-01-23

## 2023-01-23 PROBLEM — M25.69 BACK STIFFNESS: Status: ACTIVE | Noted: 2023-01-23

## 2023-01-23 PROBLEM — R29.898 LEG WEAKNESS, BILATERAL: Status: ACTIVE | Noted: 2023-01-23
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
## 2023-03-21 ENCOUNTER — TELEPHONE (OUTPATIENT)
Dept: PRIMARY CARE CLINIC | Facility: CLINIC | Age: 38
End: 2023-03-21
Payer: COMMERCIAL

## 2023-05-18 ENCOUNTER — TELEPHONE (OUTPATIENT)
Dept: PRIMARY CARE CLINIC | Facility: CLINIC | Age: 38
End: 2023-05-18
Payer: COMMERCIAL

## 2023-06-08 ENCOUNTER — OFFICE VISIT (OUTPATIENT)
Dept: PRIMARY CARE CLINIC | Facility: CLINIC | Age: 38
End: 2023-06-08
Payer: COMMERCIAL

## 2023-06-08 DIAGNOSIS — M25.541 ARTHRALGIA OF BOTH HANDS: ICD-10-CM

## 2023-06-08 DIAGNOSIS — F41.9 ANXIETY: ICD-10-CM

## 2023-06-08 DIAGNOSIS — Z13.39 ADHD (ATTENTION DEFICIT HYPERACTIVITY DISORDER) EVALUATION: Primary | ICD-10-CM

## 2023-06-08 DIAGNOSIS — M25.542 ARTHRALGIA OF BOTH HANDS: ICD-10-CM

## 2023-06-08 PROCEDURE — 99213 PR OFFICE/OUTPT VISIT, EST, LEVL III, 20-29 MIN: ICD-10-PCS | Mod: 95,,, | Performed by: STUDENT IN AN ORGANIZED HEALTH CARE EDUCATION/TRAINING PROGRAM

## 2023-06-08 PROCEDURE — 99213 OFFICE O/P EST LOW 20 MIN: CPT | Mod: 95,,, | Performed by: STUDENT IN AN ORGANIZED HEALTH CARE EDUCATION/TRAINING PROGRAM

## 2023-06-08 NOTE — PROGRESS NOTES
Telehealth Visit    The patient location is: [place]   The chief complaint leading to consultation is: Follow up     Visit type: audiovisual    Face to Face time with patient: 10 minutes   28 minutes of total time spent on the encounter, which includes face to face time and non-face to face time preparing to see the patient (eg, review of tests), Obtaining and/or reviewing separately obtained history, Documenting clinical information in the electronic or other health record, Independently interpreting results (not separately reported) and communicating results to the patient/family/caregiver, or Care coordination (not separately reported).       HPI    Patient is a 37 y.o.   Geri Mosqueda  has a past medical history of Allergy, Blood clotting tendency, DVT (deep venous thrombosis) (2/11/2014), Follicular adenoma of thyroid gland (2/6/2014), Keloid cicatrix, and PE (pulmonary embolism) (2/11/2014).    Patient reports joint pain in fingers b/l. She reports that at times she is unable to finish washing her hair. Reports associated joint pain in knees b/l. Typically pain is the worst in the morning and improves throughout the day. Little improvement with use of Cymbalta. No joint swelling     She would also like evaluation for ADHD and autism    She has been experiencing worsening of anxiety related to psychosocial stressors       Social History     Social History Narrative    Living with her boyfriend. She is from [place]. Her mother and step-father live in [facility]. She is a  and teaches voice in elementary. She attended school at [employer] and graduated from [employer]. 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      Geri Mosqueda family history includes Cancer in her brother; Colon cancer in her maternal grandmother; Depression in her mother; Diabetes in her mother; Heart attack in her maternal grandmother; Hypertension in her daughter, maternal grandmother, and  mother.    Active Medications:  Review of patient's allergies indicates:   Allergen Reactions    Eggplant Anaphylaxis, Itching and Other (See Comments)    Percocet [oxycodone-acetaminophen]      States she can tolerate tylenol     Current Outpatient Medications   Medication Instructions    albuterol (PROVENTIL/VENTOLIN HFA) 90 mcg/actuation inhaler 2 puffs, Inhalation, Every 4 hours PRN, Rescue    busPIRone (BUSPAR) 7.5 mg, Oral, 2 times daily    cyclobenzaprine (FLEXERIL) 10 mg, Oral, Daily    diclofenac sodium (VOLTAREN) 2 g, Topical (Top), Daily    DULoxetine (CYMBALTA) 60 mg, Oral, Daily    fluconazole (DIFLUCAN) 150 mg, Oral, Daily PRN    ibuprofen (ADVIL,MOTRIN) 800 mg, Oral, 2 times daily PRN    levonorgestrel (MIRENA) 20 mcg/24 hr (5 years) IUD 1 Intra Uterine Device, Intrauterine, Once    loratadine (CLARITIN) 10 mg, Oral, Daily    meloxicam (MOBIC) 15 mg, Oral, Daily    multivitamin capsule 1 capsule, Oral, Daily    tacrolimus (PROTOPIC) 0.1 % ointment Topical (Top), 2 times daily       Physical Exam    General: Does not appear to be in acute distress    Assessment and Plan     Arthralgias   She has been taking Cymbalta with little improvement in pain   Referral placed to rheumatology     ADHD evaluation   Referral placed to psychiatry      Anxiety   Continue Buspar       Orders Placed This Visit  Orders Placed This Encounter   Procedures    Ambulatory referral/consult to Psychiatry     Standing Status:   Future     Standing Expiration Date:   7/8/2024     Referral Priority:   Routine     Referral Type:   Psychiatric     Referral Reason:   Specialty Services Required     Requested Specialty:   Psychiatry     Number of Visits Requested:   1    Ambulatory referral/consult to Rheumatology     Standing Status:   Future     Standing Expiration Date:   7/8/2024     Referral Priority:   Routine     Referral Type:   Consultation     Referral Reason:   Specialty Services Required     Requested Specialty:    Rheumatology     Number of Visits Requested:   1           Upcoming Scheduled Appointments and Follow Up:    No future appointments.    Follow Up [facility] (with who? when?): No follow-ups on file.        Extended Emergency Contact Information  Primary Emergency Contact: Annia Mcneill  Address: [address] [place] [address], [place] [place] [address] [place]  Mobile Phone: [phone]  Relation: Mother  Secondary Emergency Contact: Cornelia Lermaina  Address: [address] [place] [address], [place] [place] [address] [place]  Mobile Phone: [phone]  Relation: Roommate      Monae Chapin MD   Attending Physician  Primary Care  6/8/2023 - 11:47 AM        Each patient to whom he or she provides medical services by telemedicine is:  (1) informed of the relationship between the physician and patient and the respective role of any other health care provider with respect to management of the patient; and (2) notified that he or she may decline to receive medical services by telemedicine and may withdraw from such care at any time.

## 2023-09-06 ENCOUNTER — HOSPITAL ENCOUNTER (OUTPATIENT)
Dept: RADIOLOGY | Facility: HOSPITAL | Age: 38
Discharge: HOME | End: 2023-09-06
Attending: INTERNAL MEDICINE
Payer: COMMERCIAL

## 2023-09-06 ENCOUNTER — OFFICE VISIT (OUTPATIENT)
Dept: RHEUMATOLOGY | Facility: CLINIC | Age: 38
End: 2023-09-06
Payer: COMMERCIAL

## 2023-09-06 VITALS
BODY MASS INDEX: 34.63 KG/M2 | HEART RATE: 80 BPM | WEIGHT: 247.38 LBS | HEIGHT: 71 IN | SYSTOLIC BLOOD PRESSURE: 119 MMHG | DIASTOLIC BLOOD PRESSURE: 81 MMHG

## 2023-09-06 DIAGNOSIS — F32.A ANXIETY AND DEPRESSION: ICD-10-CM

## 2023-09-06 DIAGNOSIS — Z55.9 EDUCATIONAL CIRCUMSTANCE: ICD-10-CM

## 2023-09-06 DIAGNOSIS — M25.50 PAIN IN JOINT INVOLVING MULTIPLE SITES: ICD-10-CM

## 2023-09-06 DIAGNOSIS — E66.9 OBESITY (BMI 30-39.9): ICD-10-CM

## 2023-09-06 DIAGNOSIS — M25.542 ARTHRALGIA OF BOTH HANDS: ICD-10-CM

## 2023-09-06 DIAGNOSIS — M25.541 ARTHRALGIA OF BOTH HANDS: ICD-10-CM

## 2023-09-06 DIAGNOSIS — R53.82 CHRONIC FATIGUE: ICD-10-CM

## 2023-09-06 DIAGNOSIS — M25.542 ARTHRALGIA OF BOTH HANDS: Primary | ICD-10-CM

## 2023-09-06 DIAGNOSIS — F41.9 ANXIETY AND DEPRESSION: ICD-10-CM

## 2023-09-06 DIAGNOSIS — Z86.711 HISTORY OF PULMONARY EMBOLUS (PE): ICD-10-CM

## 2023-09-06 DIAGNOSIS — M25.541 ARTHRALGIA OF BOTH HANDS: Primary | ICD-10-CM

## 2023-09-06 DIAGNOSIS — M79.7 FIBROMYALGIA: ICD-10-CM

## 2023-09-06 PROCEDURE — 99999 PR PBB SHADOW E&M-EST. PATIENT-LVL V: CPT | Mod: PBBFAC,,, | Performed by: INTERNAL MEDICINE

## 2023-09-06 PROCEDURE — 77077 JOINT SURVEY SINGLE VIEW: CPT | Mod: 26,,, | Performed by: RADIOLOGY

## 2023-09-06 PROCEDURE — 73130 X-RAY EXAM OF HAND: CPT | Mod: TC,50

## 2023-09-06 PROCEDURE — 73130 X-RAY EXAM OF HAND: CPT | Mod: 26,,, | Performed by: RADIOLOGY

## 2023-09-06 PROCEDURE — 3074F SYST BP LT 130 MM HG: CPT | Mod: CPTII,S$GLB,, | Performed by: INTERNAL MEDICINE

## 2023-09-06 PROCEDURE — 73130 XR HAND COMPLETE 3 VIEWS BILATERAL: ICD-10-PCS | Mod: 26,,, | Performed by: RADIOLOGY

## 2023-09-06 PROCEDURE — 99204 PR OFFICE/OUTPT VISIT, NEW, LEVL IV, 45-59 MIN: ICD-10-PCS | Mod: S$GLB,,, | Performed by: INTERNAL MEDICINE

## 2023-09-06 PROCEDURE — 3079F PR MOST RECENT DIASTOLIC BLOOD PRESSURE 80-89 MM HG: ICD-10-PCS | Mod: CPTII,S$GLB,, | Performed by: INTERNAL MEDICINE

## 2023-09-06 PROCEDURE — 99999 PR PBB SHADOW E&M-EST. PATIENT-LVL V: ICD-10-PCS | Mod: PBBFAC,,, | Performed by: INTERNAL MEDICINE

## 2023-09-06 PROCEDURE — 3008F BODY MASS INDEX DOCD: CPT | Mod: CPTII,S$GLB,, | Performed by: INTERNAL MEDICINE

## 2023-09-06 PROCEDURE — 1159F MED LIST DOCD IN RCRD: CPT | Mod: CPTII,S$GLB,, | Performed by: INTERNAL MEDICINE

## 2023-09-06 PROCEDURE — 77077 JOINT SURVEY SINGLE VIEW: CPT | Mod: TC

## 2023-09-06 PROCEDURE — 3074F PR MOST RECENT SYSTOLIC BLOOD PRESSURE < 130 MM HG: ICD-10-PCS | Mod: CPTII,S$GLB,, | Performed by: INTERNAL MEDICINE

## 2023-09-06 PROCEDURE — 77077 XR ARTHRITIS SURVEY: ICD-10-PCS | Mod: 26,,, | Performed by: RADIOLOGY

## 2023-09-06 PROCEDURE — 1159F PR MEDICATION LIST DOCUMENTED IN MEDICAL RECORD: ICD-10-PCS | Mod: CPTII,S$GLB,, | Performed by: INTERNAL MEDICINE

## 2023-09-06 PROCEDURE — 3079F DIAST BP 80-89 MM HG: CPT | Mod: CPTII,S$GLB,, | Performed by: INTERNAL MEDICINE

## 2023-09-06 PROCEDURE — 99204 OFFICE O/P NEW MOD 45 MIN: CPT | Mod: S$GLB,,, | Performed by: INTERNAL MEDICINE

## 2023-09-06 PROCEDURE — 3008F PR BODY MASS INDEX (BMI) DOCUMENTED: ICD-10-PCS | Mod: CPTII,S$GLB,, | Performed by: INTERNAL MEDICINE

## 2023-09-06 SDOH — SOCIAL DETERMINANTS OF HEALTH (SDOH): PROBLEMS RELATED TO EDUCATION AND LITERACY, UNSPECIFIED: Z55.9

## 2023-09-06 ASSESSMENT — ROUTINE ASSESSMENT OF PATIENT INDEX DATA (RAPID3)
MDHAQ FUNCTION SCORE: 0.1
FATIGUE SCORE: 10
PATIENT GLOBAL ASSESSMENT SCORE: 6.5
PSYCHOLOGICAL DISTRESS SCORE: 3.3
TOTAL RAPID3 SCORE: 5.61
AM STIFFNESS SCORE: 1, YES
PAIN SCORE: 10

## 2023-09-06 NOTE — PATIENT INSTRUCTIONS
Read on fibromyalgia  Check out fmaware.org    Begin a program of daily, low impact, dedicated, aerobic exercise.  Always warm up & cool down.  20 minutes of aerobic activity.  Don't overdo & Don't underdo.    Make an appointment with Psychiatry for medication adjustment.  Consider the Functional Restoration Program at [facility].    Consider a paraffin wax bath

## 2023-09-06 NOTE — PROGRESS NOTES
Subjective:     Patient ID: Geri Mosqueda is a 38 y.o. female sent by Monae Chapin MD for bilateral hand pain.    Chief Complaint: Disease Management       HPI    Always has had some aches.  Had fx both ankles as a youngster, several times.  2019 fx R ankle --has hardware.  Began to have some hand pain at least 6 - 9 month; pain from MCPs on down; pain all the time--dull & achey.  No swelling. Meloxicam and motrin don't help much.  Had taken in past in 2014 for thyroid mass  Had DVT & PE was on lovenox & warfarin.  Was on OCP at the time.  Never pregnant.  Maternal aunt may have had some blood clots (heavy smokers);       AM stiffness & before going to bed; ~ 1 hr.   .  joint pain   LBP +chronic  joint swelling  no  Raynaud's no  Dysphagia --chokes easily-  upper esophagus  tight skin  Alopecia--some thinning,   oral/nasal ulcers   parotid gland swelling no  dry eyes no   dry mouth yes  pleurisy  pericarditis  Photosensitivity--sun hurts eyes; photophobia  skin rashes--some; chronic; itchy; burning; dx EDP but nothing has helped.  Miscarriages--0/0  Thromboses +  muscle weakness no  Fevers no  Headaches + clenches jaw all the time; mouth device; hears popping  chronic or bloody diarrhea no  constipation  vaginal/urethral D/C   paresthesias no  thyroid issues as above; mass benign  Family hx of CTDs or SpA    ON duloxetine 60 mg/d (long term) + recent buspirone  help w anxiety depression    CSI =63 = Extreme  Patient answers always or often to:   Feeling tired & unrefreshed upon awakening  Muscles feel stiff and achy.    Grinding or clenching teeth  Diarrhea and/or constipation  Tires easily when physically active  Pain all over body    Difficulty concentration  Skin issues: dryness/itchiness/rashes  Physical sxs worsened by stress  Sadness or depression  Low energy  Muscle tension in neck & shoulders  Jaw pain  Suffered trauma as a child    Prior DX:  TMJ  Migraine/Tension HAs  Multiple Chemical  Sensitivities  Neck Injury (including whiplash)  Anxiety or Panic attacks  Depression.     Fibroassess  Widespread pain index--7  Symptom severity score--10                                                       Current Outpatient Medications   Medication Sig Dispense Refill    albuterol (PROVENTIL/VENTOLIN HFA) 90 mcg/actuation inhaler Inhale 2 puffs into the lungs every 4 (four) hours as needed for Wheezing or Shortness of Breath. Rescue 18 g 0    busPIRone (BUSPAR) 7.5 MG tablet Take 1 tablet (7.5 mg total) by mouth 2 (two) times daily. 180 tablet 3    DULoxetine (CYMBALTA) 60 MG capsule Take 1 capsule (60 mg total) by mouth once daily. 90 capsule 3    fluconazole (DIFLUCAN) 150 MG Tab Take 1 tablet (150 mg total) by mouth daily as needed (yeast infection). 1 tablet 1    ibuprofen (ADVIL,MOTRIN) 800 MG tablet Take 800 mg by mouth 2 (two) times daily as needed.      loratadine (CLARITIN) 10 mg tablet Take 1 tablet (10 mg total) by mouth once daily. 30 tablet 11    meloxicam (MOBIC) 15 MG tablet Take 1 tablet (15 mg total) by mouth once daily. 30 tablet 0    multivitamin capsule Take 1 capsule by mouth once daily.      diclofenac sodium (VOLTAREN) 1 % Gel Apply 2 g topically once daily. (Patient not taking: Reported on 9/6/2023) 50 g 1    levonorgestrel (MIRENA) 20 mcg/24 hr (5 years) IUD 1 Intra Uterine Device by Intrauterine route once. for 1 dose 1 Intra Uterine Device 0     No current facility-administered medications for this visit.       Never started voltaren gel    Review of patient's allergies indicates:   Allergen Reactions    Eggplant Anaphylaxis, Itching and Other (See Comments)    Percocet [oxycodone-acetaminophen]      States she can tolerate tylenol       Review of Systems   Constitutional:  Positive for diaphoresis and fatigue (non restorative). Negative for fever and unexpected weight change.   HENT:  Negative for mouth sores and trouble swallowing.    Eyes:  Negative for redness.   Respiratory:   Negative for cough and shortness of breath.    Cardiovascular:  Negative for chest pain.   Gastrointestinal:  Negative for constipation and diarrhea.   Genitourinary:  Positive for menstrual problem (hx of ovarian cysts; better on Mirena). Negative for dysuria and genital sores.   Skin:  Negative for rash.   Neurological:  Positive for syncope (x 2; overheated.). Negative for dizziness, seizures, weakness, numbness and headaches.   Hematological:  Does not bruise/bleed easily.   Psychiatric/Behavioral:  Positive for sleep disturbance (staying asleep a problem). Negative for dysphoric mood.        Past Medical History:   Diagnosis Date    Allergy     seasonal    Blood clotting tendency     DVT (deep venous thrombosis) 2014    after surgery    Follicular adenoma of thyroid gland 2014    Keloid cicatrix     PE (pulmonary embolism) 2014    after surgery       Past Surgical History:   Procedure Laterality Date    COLONOSCOPY N/A 2019    Procedure: COLONOSCOPY/Suprep;  Surgeon: Emre Carcamo MD;  Location: [facility];  Service: Endoscopy;  Laterality: N/A;    NASAL SEPTUM SURGERY  2019    Dr. Aceves    THYROID LOBECTOMY Left 2014    and isthmusectomy    TONSILLECTOMY         Family History   Problem Relation Age of Onset    Depression Mother     Diabetes Mother     Hypertension Mother     Hypertension Daughter     Heart attack Maternal Grandmother     Hypertension Maternal Grandmother     Colon cancer Maternal Grandmother     Cancer Brother     Heart disease Neg Hx     Anemia Neg Hx     Arrhythmia Neg Hx     Asthma Neg Hx     Clotting disorder Neg Hx     Fainting Neg Hx     Heart failure Neg Hx     Hyperlipidemia Neg Hx     Melanoma Neg Hx     Breast cancer Neg Hx     Ovarian cancer Neg Hx    Maternal uncle  of MI age 40  M:~ 59;  DM/Celiac Disease/Gastroparesis/HTN/on antidepressants  F: 74  4 younger brother: one asthma; gets tubes in his ears; sinus issues;  1 brother recovering  "addict.    Social History     Tobacco Use    Smoking status: Some Days     Types: Cigars    Smokeless tobacco: Never   Substance Use Topics    Alcohol use: No    Drug use: No   Very occasionally cigars;     .    Objective:     /81   Pulse 80   Ht 5' 11" (1.803 m)   Wt 112.2 kg (247 lb 5.7 oz)   BMI 34.50 kg/m²     Physical Exam   Constitutional: She is oriented to person, place, and time. She appears well-developed, well-nourished and obese. No distress.   HENT:   Head: Normocephalic and atraumatic.   Mouth/Throat: Oropharynx is clear and moist. Mucous membranes are moist. No oropharyngeal exudate.   No facial rashes  Parotids not enlarged     Eyes: Pupils are equal, round, and reactive to light. Conjunctivae are normal. Right eye exhibits no discharge. Left eye exhibits no discharge. No scleral icterus.   Neck: No JVD present. No tracheal deviation present. No thyromegaly present.   Cardiovascular: Normal rate and regular rhythm. Exam reveals no gallop and no friction rub.   No murmur heard.  Pulmonary/Chest: Effort normal and breath sounds normal. No respiratory distress. She has no wheezes. She has no rales. She exhibits no tenderness.   Abdominal: Soft. Bowel sounds are normal. She exhibits no distension and no mass. There is no abdominal tenderness. There is no rebound and no guarding.   Musculoskeletal:         General: Tenderness (multiple fibro tender pts: SI; traps; 2nd CC) present. No deformity. Normal range of motion.      Cervical back: Normal range of motion and neck supple.      Comments: FROM all jts;  No synovitis anywhere.  Ganglion cyst dorsum R hand. Non TTP       Lymphadenopathy:     She has no cervical adenopathy.   Neurological: She is alert and oriented to person, place, and time. She has normal reflexes. No cranial nerve deficit. Gait normal.   Skin: Skin is warm and dry. No rash noted. She is not diaphoretic.   Psychiatric: Her behavior is normal. Mood, " memory, affect, judgment and thought content normal.   Vitals reviewed.        1213/22: CBC/CMP/HCV/HIV neg or wnl  5/12/18: DASHA/SSA neg; C3 & C4 ok;   Assessment:   Bilateral hand arthralgia  Joint pain multiple sites  Fibromyalgia associated w anxiety/depression/chronic fatigue  Ganglion cyst dorsum R hand.  S/P PE & DVT 2/11/14   Provoked: post surgery & on OCP  Obesity  Plan:   Discussed lack of evidence supporting a rheumatic disorder for her joint pain.  In an abundance of caution, however will get labs and imaging.   The patient was educated on fibromyalgia/central sensitivity syndromes  Symptoms/presentations, non-pharmacologic and pharmacologic treatments and their efficacies were reviewed.   Non-pharmacologic approaches were stressed.  ExPRESS was reviewed in detail.  Regular/daily dedicated, low impact aerobic exercise was especially emphasized. Strategies for success were offered.  Additional mind body exercise such as yoga or variations (chair yoga) and/or Bob Chi have been validated.  Cognitive behavioral therapy is very helpful.  [employer] (online) may be helpful.  Pharmacologic treatments approved and otherwise were reviewed.  Duloxetine, Milnacipran, Pregabalin were reviewed, including side effects and toxicities.   Patient was provided with written information and referred to several websites for further information.  She was urged to f/u w Psychiatry & her PCP.  The [facility] was reviewed and patient was given info on it.  She will discuss w PCP.    As I do not manage or follow fibromyalgia, the patient may be followed by her PCP and/or her other clinicians.      We will contact her if based on her w/u we need to see her again.         CC: Monae Chapin MD      Addendum: 9/6/23: ESR <2; CRP 15; CBC ok; RF/CCP neg TSH ok; vit D 38;     9/6/23: Bilateral hands: personally viewed: foreshortening bilateral ulnar length--mild negative ulnar variance.  Bone, joint soft  tissues otherwise normal.  9/6/23: Arthritis survey: personally viewed: Flexion view of cervical spine Some endplate osteophytes and anterior longitudinal ligament calcification about C5-C6 level.   AP view of both hands demonstrate no acute fractures.  Preserved bone density and joint spaces.   AP view of both knees demonstrate no acute fractures.  Preserved bone density and joint spaces.  AP view of both feet demonstrate no acute fractures.  Preserved bone density and joint spaces.  Postoperative changes-hardware about distal tibia and fibula.

## 2023-09-06 NOTE — PROGRESS NOTES
9/6/2023    12:41 PM   Rapid3 Question Responses and Scores   MDHAQ Score 0.1   Psychologic Score 3.3   Pain Score 10   When you awakened in the morning OVER THE LAST WEEK, did you feel stiff? Yes   Fatigue Score 10   Global Health Score 6.5   RAPID3 Score 5.61

## 2023-10-02 ENCOUNTER — HOSPITAL ENCOUNTER (EMERGENCY)
Facility: OTHER | Age: 38
Discharge: HOME | End: 2023-10-02
Attending: EMERGENCY MEDICINE
Payer: COMMERCIAL

## 2023-10-02 VITALS
BODY MASS INDEX: 37.1 KG/M2 | SYSTOLIC BLOOD PRESSURE: 124 MMHG | WEIGHT: 265 LBS | TEMPERATURE: 98 F | HEART RATE: 73 BPM | HEIGHT: 71 IN | RESPIRATION RATE: 18 BRPM | OXYGEN SATURATION: 100 % | DIASTOLIC BLOOD PRESSURE: 68 MMHG

## 2023-10-02 DIAGNOSIS — R51.9 NONINTRACTABLE HEADACHE, UNSPECIFIED CHRONICITY PATTERN, UNSPECIFIED HEADACHE TYPE: Primary | ICD-10-CM

## 2023-10-02 LAB
ANION GAP SERPL CALC-SCNC: 9 MMOL/L (ref 8–16)
B-HCG UR QL: NEGATIVE
BASOPHILS # BLD AUTO: 0.05 K/UL (ref 0–0.2)
BASOPHILS NFR BLD: 0.9 % (ref 0–1.9)
BUN SERPL-MCNC: 8 MG/DL (ref 6–20)
CALCIUM SERPL-MCNC: 9 MG/DL (ref 8.7–10.5)
CHLORIDE SERPL-SCNC: 108 MMOL/L (ref 95–110)
CO2 SERPL-SCNC: 22 MMOL/L (ref 23–29)
CREAT SERPL-MCNC: 0.8 MG/DL (ref 0.5–1.4)
CTP QC/QA: YES
DIFFERENTIAL METHOD: ABNORMAL
EOSINOPHIL # BLD AUTO: 0.1 K/UL (ref 0–0.5)
EOSINOPHIL NFR BLD: 2.3 % (ref 0–8)
ERYTHROCYTE [DISTWIDTH] IN BLOOD BY AUTOMATED COUNT: 13.9 % (ref 11.5–14.5)
EST. GFR  (NO RACE VARIABLE): >60 ML/MIN/1.73 M^2
GLUCOSE SERPL-MCNC: 77 MG/DL (ref 70–110)
HCT VFR BLD AUTO: 37.3 % (ref 37–48.5)
HGB BLD-MCNC: 12.6 G/DL (ref 12–16)
IMM GRANULOCYTES # BLD AUTO: 0.03 K/UL (ref 0–0.04)
IMM GRANULOCYTES NFR BLD AUTO: 0.5 % (ref 0–0.5)
LYMPHOCYTES # BLD AUTO: 2.5 K/UL (ref 1–4.8)
LYMPHOCYTES NFR BLD: 43.6 % (ref 18–48)
MCH RBC QN AUTO: 26.5 PG (ref 27–31)
MCHC RBC AUTO-ENTMCNC: 33.8 G/DL (ref 32–36)
MCV RBC AUTO: 79 FL (ref 82–98)
MONOCYTES # BLD AUTO: 0.6 K/UL (ref 0.3–1)
MONOCYTES NFR BLD: 11.1 % (ref 4–15)
NEUTROPHILS # BLD AUTO: 2.4 K/UL (ref 1.8–7.7)
NEUTROPHILS NFR BLD: 41.6 % (ref 38–73)
NRBC BLD-RTO: 0 /100 WBC
PLATELET # BLD AUTO: 274 K/UL (ref 150–450)
PMV BLD AUTO: 9.5 FL (ref 9.2–12.9)
POTASSIUM SERPL-SCNC: 3.7 MMOL/L (ref 3.5–5.1)
RBC # BLD AUTO: 4.75 M/UL (ref 4–5.4)
SODIUM SERPL-SCNC: 139 MMOL/L (ref 136–145)
WBC # BLD AUTO: 5.76 K/UL (ref 3.9–12.7)

## 2023-10-02 PROCEDURE — 25000003 PHARM REV CODE 250: Performed by: EMERGENCY MEDICINE

## 2023-10-02 PROCEDURE — 63600175 PHARM REV CODE 636 W HCPCS: Performed by: EMERGENCY MEDICINE

## 2023-10-02 PROCEDURE — 99285 EMERGENCY DEPT VISIT HI MDM: CPT | Mod: 25

## 2023-10-02 PROCEDURE — 81025 URINE PREGNANCY TEST: CPT | Performed by: PHYSICIAN ASSISTANT

## 2023-10-02 PROCEDURE — 96374 THER/PROPH/DIAG INJ IV PUSH: CPT

## 2023-10-02 PROCEDURE — 80048 BASIC METABOLIC PNL TOTAL CA: CPT | Performed by: EMERGENCY MEDICINE

## 2023-10-02 PROCEDURE — 85025 COMPLETE CBC W/AUTO DIFF WBC: CPT | Performed by: EMERGENCY MEDICINE

## 2023-10-02 PROCEDURE — 96375 TX/PRO/DX INJ NEW DRUG ADDON: CPT

## 2023-10-02 RX ORDER — BUTALBITAL, ACETAMINOPHEN AND CAFFEINE 50; 325; 40 MG/1; MG/1; MG/1
1 TABLET ORAL EVERY 4 HOURS PRN
Qty: 20 TABLET | Refills: 0 | Status: SHIPPED | OUTPATIENT
Start: 2023-10-02 | End: 2023-11-01

## 2023-10-02 RX ORDER — KETOROLAC TROMETHAMINE 30 MG/ML
10 INJECTION, SOLUTION INTRAMUSCULAR; INTRAVENOUS
Status: COMPLETED | OUTPATIENT
Start: 2023-10-02 | End: 2023-10-02

## 2023-10-02 RX ORDER — IBUPROFEN 600 MG/1
600 TABLET ORAL EVERY 6 HOURS PRN
Qty: 20 TABLET | Refills: 0 | Status: SHIPPED | OUTPATIENT
Start: 2023-10-02 | End: 2024-03-20

## 2023-10-02 RX ORDER — DIPHENHYDRAMINE HYDROCHLORIDE 50 MG/ML
12.5 INJECTION INTRAMUSCULAR; INTRAVENOUS
Status: COMPLETED | OUTPATIENT
Start: 2023-10-02 | End: 2023-10-02

## 2023-10-02 RX ORDER — METOCLOPRAMIDE HYDROCHLORIDE 5 MG/ML
10 INJECTION INTRAMUSCULAR; INTRAVENOUS
Status: COMPLETED | OUTPATIENT
Start: 2023-10-02 | End: 2023-10-02

## 2023-10-02 RX ADMIN — DIPHENHYDRAMINE HYDROCHLORIDE 12.5 MG: 50 INJECTION, SOLUTION INTRAMUSCULAR; INTRAVENOUS at 02:10

## 2023-10-02 RX ADMIN — METOCLOPRAMIDE 10 MG: 5 INJECTION, SOLUTION INTRAMUSCULAR; INTRAVENOUS at 02:10

## 2023-10-02 RX ADMIN — KETOROLAC TROMETHAMINE 10 MG: 30 INJECTION, SOLUTION INTRAMUSCULAR at 02:10

## 2023-10-02 RX ADMIN — SODIUM CHLORIDE 1000 ML: 9 INJECTION, SOLUTION INTRAVENOUS at 02:10

## 2023-10-02 NOTE — ED TRIAGE NOTES
Pt presents to ED with c/o headache x1 month. Pt reports seeing urgent care provider on the 21st and received Rx that provided some relief. Endorses photophobia and N/V. Denies blurred vision or radiating pain. AAOx4, NAD noted.

## 2023-10-02 NOTE — ED NOTES
Patient states that the pain is all over her head X1 month , pt states she is nausea with the pain and at times has problems with her vision.

## 2023-10-02 NOTE — Clinical Note
"Geri"Hakan Mosqueda was seen and treated in our emergency department on 10/2/2023.  She may return to work on 10/05/2023.       If you have any questions or concerns, please don't hesitate to call.      Eber Powell MD"

## 2023-10-02 NOTE — FIRST PROVIDER EVALUATION
Emergency Department TeleTriage Encounter Note      CHIEF COMPLAINT    Chief Complaint   Patient presents with    Headache     X 1 month, pt states that she was recently prescribed meds at , however her insurance will only cover 9 pills in 25 days        VITAL SIGNS   Initial Vitals [10/02/23 1256]   BP Pulse Resp Temp SpO2   134/74 88 18 98.6 °F (37 °C) 98 %      MAP       --            ALLERGIES    Review of patient's allergies indicates:   Allergen Reactions    Eggplant Anaphylaxis, Itching and Other (See Comments)    Percocet [oxycodone-acetaminophen]      States she can tolerate tylenol       PROVIDER TRIAGE NOTE  This is a teletriage evaluation of a 38 y.o. female presenting to the ED complaining of headache. Patient reports intractable headache for 4 weeks. Was given headache medication from  but insurance will only cover 9 pills per month. She denies nausea, vomiting, neck pain, fever, vision changes, numbness, tingling,or weakness. She has photophobia. Patient does not have history of similar headaches.     Patient is alert and oriented. She speaks in complete sentences. She is sitting upright in the chair in no distress.     Initial orders will be placed and care will be transferred to an alternate provider when patient is roomed for a full evaluation. Any additional orders and the final disposition will be determined by that provider.         ORDERS  Labs Reviewed - No data to display    ED Orders (720h ago, onward)      Start Ordered     Status Ordering Provider    10/02/23 1308 10/02/23 1307  POCT urine pregnancy  Once         Ordered CLAUDIA GUEVARA    10/02/23 1306 10/02/23 1306  CT Head Without Contrast  1 time imaging         Ordered CLAUDIA GUEVARA              Virtual Visit Note: The provider triage portion of this emergency department evaluation and documentation was performed via [employer], a HIPAA-compliant telemedicine application, in concert with a tele-presenter in the room. A face  to face patient evaluation with one of my colleagues will occur once the patient is placed in an emergency department room.      DISCLAIMER: This note was prepared with [employer] voice recognition transcription software. Garbled syntax, mangled pronouns, and other bizarre constructions may be attributed to that software system.

## 2023-10-02 NOTE — ED NOTES
URINE COLLECTION PENDING: Pt states she does not need to void at this time. Sterile specimen container and urine clean catch instructions given to patient. Pt instructed to notify nurse immediately once urine specimen has been obtained. Pt verbalize understanding. Will continue to monitor.

## 2023-10-02 NOTE — ED PROVIDER NOTES
Encounter Date: 10/2/2023    SCRIBE #1 NOTE: I, Maverick Otilia, am scribing for, and in the presence of,  Eber Powell MD.       History     Chief Complaint   Patient presents with    Headache     X 1 month, pt states that she was recently prescribed meds at , however her insurance will only cover 9 pills in 25 days      Time seen by provider: 2:31 PM    Geri Mosqueda is a 38 y.o. female, with a PMHx of PE, depression, DVT, and fibromyalgia, who presents to the ED with a headache for the last month. The patient describes her headache as a constant stabbing pain throughout the front and back of her head accompanied by dizziness, nausea, and light sensitivity. She denies any fevers, chills, or cough. Patient states she had received her COVID booster when symptoms began but denies any coinciding injury, illness, or prior episode. She reports taking ibuprofen and methocarbamol with no relief. Patient was seen at  on Sept 21st with her symptoms and tested positive for ear infection. She was given amoxicillin reporting today she has five days left on her course as well as imitrex which provided positive headache relief. She notes she was only approved for nine pills of imitrex by her insurance and has already used her medication. Patient reports daily prescription use of 60 mg cymbalta and 7.5 mg buspar twice a day. She notes use of mirena IUD with regular cycles, LMP Sept 3rd. Patient reports daily use of glasses for nearsightedness. SHx includes occasional smoking and rare drinking but denies any drug use. This is the extent of the patient's complaints today in the Emergency Department.      The history is provided by the patient.     Review of patient's allergies indicates:   Allergen Reactions    Eggplant Anaphylaxis, Itching and Other (See Comments)    Percocet [oxycodone-acetaminophen]      States she can tolerate tylenol     Past Medical History:   Diagnosis Date    Allergy     seasonal     Blood clotting tendency     DVT (deep venous thrombosis) 2/11/2014    after surgery    Follicular adenoma of thyroid gland 2/6/2014    Keloid cicatrix     PE (pulmonary embolism) 2/11/2014    after surgery     Past Surgical History:   Procedure Laterality Date    COLONOSCOPY N/A 7/30/2019    Procedure: COLONOSCOPY/Suprep;  Surgeon: Emre Carcamo MD;  Location: [facility];  Service: Endoscopy;  Laterality: N/A;    NASAL SEPTUM SURGERY  03/2019    Dr. Aceves    THYROID LOBECTOMY Left 2/6/2014    and isthmusectomy    TONSILLECTOMY       Family History   Problem Relation Age of Onset    Depression Mother     Diabetes Mother     Hypertension Mother     Hypertension Daughter     Heart attack Maternal Grandmother     Hypertension Maternal Grandmother     Colon cancer Maternal Grandmother     Cancer Brother     Heart disease Neg Hx     Anemia Neg Hx     Arrhythmia Neg Hx     Asthma Neg Hx     Clotting disorder Neg Hx     Fainting Neg Hx     Heart failure Neg Hx     Hyperlipidemia Neg Hx     Melanoma Neg Hx     Breast cancer Neg Hx     Ovarian cancer Neg Hx      Social History     Tobacco Use    Smoking status: Some Days     Types: Cigars    Smokeless tobacco: Never   Substance Use Topics    Alcohol use: No    Drug use: No     Review of Systems  Constitutional-no fever  HEENT-no congestion  Eyes-no redness, light sensitivity  Respiratory-no shortness of breath  Cardio-no chest pain  GI-no abdominal pain, nausea  Endocrine-no cold intolerance  -no difficulty urinating  MSK-no myalgias  Skin-no rashes  Allergy-no environmental allergy  Neurologic-, headaches, dizziness  Hematology-no swollen nodes  Behavioral-no confusion   Physical Exam     Initial Vitals [10/02/23 1256]   BP Pulse Resp Temp SpO2   134/74 88 18 98.6 °F (37 °C) 98 %      MAP       --         Physical Exam  Constitutional:  Uncomfortable appearing 38-year-old female in moderate distress  Eyes: Conjunctivae normal.  ENT       Head: Normocephalic,  atraumatic.       Nose: No congestion.       Mouth/Throat: Mucous membranes are moist.  Hematological/Lymphatic/Immunilogical: No cervical lymphadenopathy.  Cardiovascular: Normal rate, regular rhythm. Normal and symmetric distal pulses.  Respiratory: Normal respiratory effort. Breath sounds are normal.  Gastrointestinal: Soft, nontender.   Musculoskeletal: Normal range of motion in all extremities. No obvious deformities or swelling.  Neurologic: Alert, oriented. Normal speech and language. No gross focal neurologic deficits are appreciated.  NIH 0  GCS- 15  CN2-12 intact  5/5 strength all 4 extremities  Normal gait  Negative rhomberg  No appreciable drift  Skin: Skin is warm, dry. No rash noted.  Psychiatric: Mood and affect are normal.    ED Course   Procedures  Labs Reviewed   CBC W/ AUTO DIFFERENTIAL - Abnormal; Notable for the following components:       Result Value    MCV 79 (*)     MCH 26.5 (*)     All other components within normal limits   BASIC METABOLIC PANEL - Abnormal; Notable for the following components:    CO2 22 (*)     All other components within normal limits   POCT URINE PREGNANCY          Imaging Results              CT Head Without Contrast (Final result)  Result time 10/02/23 15:29:14      Final result by Dayna Archibald MD (10/02/23 15:29:14)                   Impression:      No acute abnormality.      Electronically signed by: Dayna Archibald  Date:    10/02/2023  Time:    15:29               Narrative:    EXAMINATION:  CT HEAD WITHOUT CONTRAST    CLINICAL HISTORY:  Headache, chronic, new features or increased frequency;    TECHNIQUE:  Low dose axial CT images obtained throughout the head without intravenous contrast. Sagittal and coronal reconstructions were performed.    COMPARISON:  None.    FINDINGS:  Intracranial compartment:    Ventricles are normal in size for age and midline. Sulci and basilar cisterns are preserved.   No acute extra-axial fluid collections.    No parenchymal  mass, hemorrhage, edema or major vascular distribution infarct.    Skull/extracranial contents (limited evaluation): No depressed skull fracture. Mastoid air cells are clear.Paranasal sinuses are essentially clear.                                    X-Rays:   Independently Interpreted Readings:   Other Readings:  CT head-independently reviewed CT imaging of the head and agree with Radiology interpretation    Medications   metoclopramide HCl injection 10 mg (10 mg Intravenous Given 10/2/23 1443)   ketorolac injection 9.999 mg (9.999 mg Intravenous Given 10/2/23 1442)   diphenhydrAMINE injection 12.5 mg (12.5 mg Intravenous Given 10/2/23 1442)   sodium chloride 0.9% bolus 1,000 mL 1,000 mL (1,000 mLs Intravenous New Bag 10/2/23 1443)     Medical Decision Making  Headache represents a broad differential with many sinister and benign causes.  Consideration of such causes and a workup with regard to such causes as - Subarachnoid, meningitis, hypertensive urgency and emergency, skull fractures, malignancies, tension headache migraine among a myriad of other causes.  There is evidence throughout literature that a negative head CT within 6 hr of onset of headache for subarachnoid hemorrhage is greater than 99% sensitive for this diagnosis.  Because headaches offer a broad differential and a wide range of severity from mild discomfort to debilitating or life-threatening a discussion was undertaken with the patient regarding the risks and benefits of investigation including but not limited to imaging, lumbar puncture, referral for specialty evaluation, and admission.  The disposition reflects the likely cause of the headache and a discussion of next steps in evaluation were discussed with patient.     Problems Addressed:  Nonintractable headache, unspecified chronicity pattern, unspecified headache type: complicated acute illness or injury    Amount and/or Complexity of Data Reviewed  External Data Reviewed: labs, radiology,  ECG and notes.     Details: Previous evaluation in the outpatient setting for headache, controlled with Imitrex previously  Labs: ordered.  Radiology: ordered and independent interpretation performed.    Risk  Prescription drug management.            Scribe Attestation:   Scribe #1: I performed the above scribed service and the documentation accurately describes the services I performed. I attest to the accuracy of the note.    Physician Attestation for Scribe: I, eber powell, reviewed documentation as scribed in my presence, which is both accurate and complete.                       Clinical Impression:   Final diagnoses:  [R51.9] Nonintractable headache, unspecified chronicity pattern, unspecified headache type (Primary)        ED Disposition Condition    Discharge Stable          ED Prescriptions       Medication Sig Dispense Start Date End Date Auth. Provider    butalbital-acetaminophen-caffeine -40 mg (FIORICET, ESGIC) -40 mg per tablet Take 1 tablet by mouth every 4 (four) hours as needed for Pain. 20 tablet 10/2/2023 11/1/2023 Eber Powell MD    ibuprofen (ADVIL,MOTRIN) 600 MG tablet Take 1 tablet (600 mg total) by mouth every 6 (six) hours as needed for Pain. 20 tablet 10/2/2023 -- Eber Powell MD          Follow-up Information       Follow up With Specialties Details Why Contact Info    Monae Chapin MD Internal Medicine Call in 1 day If symptoms worsen, For a follow up visit about today [id number]  [facility] [id number]  [phone]               Eber Powell MD  10/04/23 [id number]

## 2023-10-02 NOTE — DISCHARGE INSTRUCTIONS
Mrs. Mosqueda,    Thank you for letting me care for you today! It was nice meeting you, and I hope you feel better soon.   If you would like access to your chart and what was done today please utilize the [facility].   Please come back to [facility] for all of your future medical needs.    Our goal in the emergency department is to always give you outstanding care and exceptional service. You may receive a survey by mail or e-mail in the next week regarding your experience in our ED. We would greatly appreciate you completing and returning the survey. Your feedback provides us with a way to recognize our staff who give very good care and it helps us learn how to improve when your experience was below our aspiration of excellence.     Sincerely,    Eber Powell MD  Board Certified Emergency Physician

## 2023-11-13 ENCOUNTER — PATIENT MESSAGE (OUTPATIENT)
Dept: ADMINISTRATIVE | Facility: HOSPITAL | Age: 38
End: 2023-11-13
Payer: COMMERCIAL

## 2023-11-22 ENCOUNTER — LAB VISIT (OUTPATIENT)
Dept: LAB | Facility: HOSPITAL | Age: 38
End: 2023-11-22
Attending: STUDENT IN AN ORGANIZED HEALTH CARE EDUCATION/TRAINING PROGRAM
Payer: COMMERCIAL

## 2023-11-22 ENCOUNTER — OFFICE VISIT (OUTPATIENT)
Dept: PRIMARY CARE CLINIC | Facility: CLINIC | Age: 38
End: 2023-11-22
Payer: COMMERCIAL

## 2023-11-22 VITALS
SYSTOLIC BLOOD PRESSURE: 106 MMHG | HEIGHT: 71 IN | DIASTOLIC BLOOD PRESSURE: 80 MMHG | BODY MASS INDEX: 34.69 KG/M2 | HEART RATE: 98 BPM | WEIGHT: 247.81 LBS | OXYGEN SATURATION: 98 % | TEMPERATURE: 99 F

## 2023-11-22 DIAGNOSIS — F51.01 PRIMARY INSOMNIA: ICD-10-CM

## 2023-11-22 DIAGNOSIS — G44.219 EPISODIC TENSION-TYPE HEADACHE, NOT INTRACTABLE: Primary | ICD-10-CM

## 2023-11-22 DIAGNOSIS — G47.33 OSA (OBSTRUCTIVE SLEEP APNEA): ICD-10-CM

## 2023-11-22 DIAGNOSIS — I26.99 PULMONARY EMBOLISM, BILATERAL: ICD-10-CM

## 2023-11-22 DIAGNOSIS — G44.219 EPISODIC TENSION-TYPE HEADACHE, NOT INTRACTABLE: ICD-10-CM

## 2023-11-22 LAB
B-HCG UR QL: NEGATIVE
CTP QC/QA: YES
MAGNESIUM SERPL-MCNC: 2.1 MG/DL (ref 1.6–2.6)

## 2023-11-22 PROCEDURE — 3079F PR MOST RECENT DIASTOLIC BLOOD PRESSURE 80-89 MM HG: ICD-10-PCS | Mod: CPTII,S$GLB,, | Performed by: STUDENT IN AN ORGANIZED HEALTH CARE EDUCATION/TRAINING PROGRAM

## 2023-11-22 PROCEDURE — 99214 PR OFFICE/OUTPT VISIT, EST, LEVL IV, 30-39 MIN: ICD-10-PCS | Mod: S$GLB,,, | Performed by: STUDENT IN AN ORGANIZED HEALTH CARE EDUCATION/TRAINING PROGRAM

## 2023-11-22 PROCEDURE — 3079F DIAST BP 80-89 MM HG: CPT | Mod: CPTII,S$GLB,, | Performed by: STUDENT IN AN ORGANIZED HEALTH CARE EDUCATION/TRAINING PROGRAM

## 2023-11-22 PROCEDURE — 99214 OFFICE O/P EST MOD 30 MIN: CPT | Mod: S$GLB,,, | Performed by: STUDENT IN AN ORGANIZED HEALTH CARE EDUCATION/TRAINING PROGRAM

## 2023-11-22 PROCEDURE — 99999 PR PBB SHADOW E&M-EST. PATIENT-LVL IV: ICD-10-PCS | Mod: PBBFAC,,, | Performed by: STUDENT IN AN ORGANIZED HEALTH CARE EDUCATION/TRAINING PROGRAM

## 2023-11-22 PROCEDURE — 36415 COLL VENOUS BLD VENIPUNCTURE: CPT | Mod: PN | Performed by: STUDENT IN AN ORGANIZED HEALTH CARE EDUCATION/TRAINING PROGRAM

## 2023-11-22 PROCEDURE — 1159F PR MEDICATION LIST DOCUMENTED IN MEDICAL RECORD: ICD-10-PCS | Mod: CPTII,S$GLB,, | Performed by: STUDENT IN AN ORGANIZED HEALTH CARE EDUCATION/TRAINING PROGRAM

## 2023-11-22 PROCEDURE — 3074F PR MOST RECENT SYSTOLIC BLOOD PRESSURE < 130 MM HG: ICD-10-PCS | Mod: CPTII,S$GLB,, | Performed by: STUDENT IN AN ORGANIZED HEALTH CARE EDUCATION/TRAINING PROGRAM

## 2023-11-22 PROCEDURE — 83735 ASSAY OF MAGNESIUM: CPT | Performed by: STUDENT IN AN ORGANIZED HEALTH CARE EDUCATION/TRAINING PROGRAM

## 2023-11-22 PROCEDURE — 81025 POCT URINE PREGNANCY: ICD-10-PCS | Mod: S$GLB,,, | Performed by: STUDENT IN AN ORGANIZED HEALTH CARE EDUCATION/TRAINING PROGRAM

## 2023-11-22 PROCEDURE — 3008F BODY MASS INDEX DOCD: CPT | Mod: CPTII,S$GLB,, | Performed by: STUDENT IN AN ORGANIZED HEALTH CARE EDUCATION/TRAINING PROGRAM

## 2023-11-22 PROCEDURE — 3008F PR BODY MASS INDEX (BMI) DOCUMENTED: ICD-10-PCS | Mod: CPTII,S$GLB,, | Performed by: STUDENT IN AN ORGANIZED HEALTH CARE EDUCATION/TRAINING PROGRAM

## 2023-11-22 PROCEDURE — 3074F SYST BP LT 130 MM HG: CPT | Mod: CPTII,S$GLB,, | Performed by: STUDENT IN AN ORGANIZED HEALTH CARE EDUCATION/TRAINING PROGRAM

## 2023-11-22 PROCEDURE — 99999 PR PBB SHADOW E&M-EST. PATIENT-LVL IV: CPT | Mod: PBBFAC,,, | Performed by: STUDENT IN AN ORGANIZED HEALTH CARE EDUCATION/TRAINING PROGRAM

## 2023-11-22 PROCEDURE — 1159F MED LIST DOCD IN RCRD: CPT | Mod: CPTII,S$GLB,, | Performed by: STUDENT IN AN ORGANIZED HEALTH CARE EDUCATION/TRAINING PROGRAM

## 2023-11-22 PROCEDURE — 81025 URINE PREGNANCY TEST: CPT | Mod: S$GLB,,, | Performed by: STUDENT IN AN ORGANIZED HEALTH CARE EDUCATION/TRAINING PROGRAM

## 2023-11-22 NOTE — PROGRESS NOTES
"Primary Care  Office Visit - In Person  11/22/2023      HPI    Patient is a 38 y.o.   Geri Mosqueda  has a past medical history of Allergy, Blood clotting tendency, DVT (deep venous thrombosis) (2/11/2014), Follicular adenoma of thyroid gland (2/6/2014), Keloid cicatrix, and PE (pulmonary embolism) (2/11/2014).    Patient presents with     Reports daily headaches - frontal, bandlike headache. Symptoms are associated with nausea   Headaches are occurring about twice/week. 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 Stress levels are at baseline   No alcohol   PO intake - rarely skips meals, water intake could be greater   No new medications     Active Medications:  Current Outpatient Medications   Medication Instructions    albuterol (PROVENTIL/VENTOLIN HFA) 90 mcg/actuation inhaler 2 puffs, Inhalation, Every 4 hours PRN, Rescue    busPIRone (BUSPAR) 7.5 mg, Oral, 2 times daily    diclofenac sodium (VOLTAREN) 2 g, Topical (Top), Daily    DULoxetine (CYMBALTA) 60 mg, Oral, Daily    fluconazole (DIFLUCAN) 150 mg, Oral, Daily PRN    ibuprofen (ADVIL,MOTRIN) 800 mg, Oral, 2 times daily PRN    ibuprofen (ADVIL,MOTRIN) 600 mg, Oral, Every 6 hours PRN    levonorgestrel (MIRENA) 20 mcg/24 hr (5 years) IUD 1 Intra Uterine Device, Intrauterine, Once    loratadine (CLARITIN) 10 mg, Oral, Daily    meloxicam (MOBIC) 15 mg, Oral, Daily    multivitamin capsule 1 capsule, Oral, Daily       Vitals:    11/22/23 1303   BP: 106/80   BP Location: Right arm   Patient Position: Sitting   Pulse: 98   Temp: 98.6 °F (37 °C)   SpO2: 98%   Weight: 112.4 kg (247 lb 12.8 oz)   Height: 5' 11" (1.803 m)       Physical Exam  Vitals reviewed.   Constitutional:       General: She is not in acute distress.  Cardiovascular:      Rate and Rhythm: Normal " rate and regular rhythm.      Pulses: Normal pulses.      Heart sounds: Normal heart sounds.   Pulmonary:      Effort: Pulmonary effort is normal.      Breath sounds: Normal breath sounds.   Musculoskeletal:      Right lower leg: No edema.      Left lower leg: No edema.   Neurological:      General: No focal deficit present.      Mental Status: She is oriented to person, place, and time.      Cranial Nerves: Cranial nerves 2-12 are intact.      Sensory: Sensation is intact.      Motor: Motor function is intact. No weakness or pronator drift.      Coordination: Coordination is intact. Finger-Nose-Finger Test and Heel to Shin Test normal.      Gait: Gait is intact.          Assessment and Plan     1. Episodic tension-type headache, not intractable  Comments:  Counseled on sleep hygiene, increasing water intake, and LAYNE  Unremarklable CT head  Previous workup for hypercoagulable state negative following VTE in 2014  Orders:  -     Magnesium; Future; Expected date: 11/22/2023  -     POCT Urine Pregnancy    2. Pulmonary embolism, bilateral    3. Primary insomnia    4. LAYNE (obstructive sleep apnea)                   Upcoming Scheduled Appointments and Follow Up:    Future Appointments   Date Time Provider Department Center   2/22/2024  2:30 PM Monae Chapin MD [facility]       Follow Up [facility] Care (with who? when?): Follow up in about 3 months (around 2/22/2024).      Extended Emergency Contact Information  Primary Emergency Contact: Annia Mcneill  Address: [address] [place] [address], [place] [address] [place]  Mobile Phone: [phone]  Relation: Mother  Secondary Emergency Contact: Latrice Lerma  Address: [address] [place] [address], [place] [address] [place]  Mobile Phone: [phone]  Relation: Roommate      Monae Chapin MD   Internal Medicine  11/22/2023 - 1:20 PM    I spent a total of 30 minutes on the day of the  visit.This includes face to face time and non-face to face time preparing to see the patient (eg, review of tests), obtaining and/or reviewing separately obtained history, documenting clinical information in the electronic or other health record, independently interpreting results and communicating results to the patient/family/caregiver, or care coordinator.    While patients have the right to access their medical record, it is essential to recognize that progress notes primarily serve as a means of communication among healthcare professionals.

## 2023-12-17 RX ORDER — DULOXETIN HYDROCHLORIDE 60 MG/1
60 CAPSULE, DELAYED RELEASE ORAL
Qty: 90 CAPSULE | Refills: 3 | Status: SHIPPED | OUTPATIENT
Start: 2023-12-17

## 2023-12-17 NOTE — TELEPHONE ENCOUNTER
No care due was identified.  [facility] Embedded Care Due Messages. Reference number: [id number].   12/17/2023 3:33:11 AM CST

## 2023-12-17 NOTE — TELEPHONE ENCOUNTER
Refill Decision Note   Nevarez Panda  is requesting a refill authorization.  Brief Assessment and Rationale for Refill:  Approve     Medication Therapy Plan:       Medication Reconciliation Completed: No   Comments:     No Care Gaps recommended.     Note composed:11:04 AM 12/17/2023

## 2023-12-26 ENCOUNTER — HOSPITAL ENCOUNTER (OUTPATIENT)
Dept: RADIOLOGY | Facility: OTHER | Age: 38
Discharge: HOME | End: 2023-12-26
Attending: STUDENT IN AN ORGANIZED HEALTH CARE EDUCATION/TRAINING PROGRAM
Payer: COMMERCIAL

## 2023-12-26 DIAGNOSIS — Z12.31 ENCOUNTER FOR SCREENING MAMMOGRAM FOR BREAST CANCER: ICD-10-CM

## 2023-12-26 PROCEDURE — 77067 SCR MAMMO BI INCL CAD: CPT | Mod: 26,,, | Performed by: RADIOLOGY

## 2023-12-26 PROCEDURE — 77067 MAMMO DIGITAL SCREENING BILAT WITH TOMO: ICD-10-PCS | Mod: 26,,, | Performed by: RADIOLOGY

## 2023-12-26 PROCEDURE — 77063 MAMMO DIGITAL SCREENING BILAT WITH TOMO: ICD-10-PCS | Mod: 26,,, | Performed by: RADIOLOGY

## 2023-12-26 PROCEDURE — 77067 SCR MAMMO BI INCL CAD: CPT | Mod: TC

## 2023-12-26 PROCEDURE — 77063 BREAST TOMOSYNTHESIS BI: CPT | Mod: 26,,, | Performed by: RADIOLOGY

## 2024-01-02 ENCOUNTER — OFFICE VISIT (OUTPATIENT)
Dept: OBSTETRICS AND GYNECOLOGY | Facility: CLINIC | Age: 39
End: 2024-01-02
Payer: COMMERCIAL

## 2024-01-02 VITALS
BODY MASS INDEX: 35.4 KG/M2 | SYSTOLIC BLOOD PRESSURE: 128 MMHG | HEIGHT: 71 IN | DIASTOLIC BLOOD PRESSURE: 88 MMHG | WEIGHT: 252.88 LBS

## 2024-01-02 DIAGNOSIS — R10.2 FEMALE PELVIC PAIN: ICD-10-CM

## 2024-01-02 DIAGNOSIS — B97.7 HPV IN FEMALE: Primary | ICD-10-CM

## 2024-01-02 LAB
BILIRUB SERPL-MCNC: NORMAL MG/DL
BLOOD URINE, POC: NORMAL
CLARITY, POC UA: NORMAL
COLOR, POC UA: NORMAL
GLUCOSE UR QL STRIP: NORMAL
KETONES UR QL STRIP: NORMAL
LEUKOCYTE ESTERASE URINE, POC: NORMAL
NITRITE, POC UA: NORMAL
PH, POC UA: NORMAL
PROTEIN, POC: NORMAL
SPECIFIC GRAVITY, POC UA: NORMAL
UROBILINOGEN, POC UA: NORMAL

## 2024-01-02 PROCEDURE — 87491 CHLMYD TRACH DNA AMP PROBE: CPT | Performed by: STUDENT IN AN ORGANIZED HEALTH CARE EDUCATION/TRAINING PROGRAM

## 2024-01-02 PROCEDURE — 87624 HPV HI-RISK TYP POOLED RSLT: CPT | Performed by: STUDENT IN AN ORGANIZED HEALTH CARE EDUCATION/TRAINING PROGRAM

## 2024-01-02 PROCEDURE — 99999 PR PBB SHADOW E&M-EST. PATIENT-LVL III: CPT | Mod: PBBFAC,,, | Performed by: STUDENT IN AN ORGANIZED HEALTH CARE EDUCATION/TRAINING PROGRAM

## 2024-01-02 PROCEDURE — 81002 URINALYSIS NONAUTO W/O SCOPE: CPT | Mod: S$GLB,,, | Performed by: STUDENT IN AN ORGANIZED HEALTH CARE EDUCATION/TRAINING PROGRAM

## 2024-01-02 PROCEDURE — 3008F BODY MASS INDEX DOCD: CPT | Mod: CPTII,S$GLB,, | Performed by: STUDENT IN AN ORGANIZED HEALTH CARE EDUCATION/TRAINING PROGRAM

## 2024-01-02 PROCEDURE — 3079F DIAST BP 80-89 MM HG: CPT | Mod: CPTII,S$GLB,, | Performed by: STUDENT IN AN ORGANIZED HEALTH CARE EDUCATION/TRAINING PROGRAM

## 2024-01-02 PROCEDURE — 1159F MED LIST DOCD IN RCRD: CPT | Mod: CPTII,S$GLB,, | Performed by: STUDENT IN AN ORGANIZED HEALTH CARE EDUCATION/TRAINING PROGRAM

## 2024-01-02 PROCEDURE — 3074F SYST BP LT 130 MM HG: CPT | Mod: CPTII,S$GLB,, | Performed by: STUDENT IN AN ORGANIZED HEALTH CARE EDUCATION/TRAINING PROGRAM

## 2024-01-02 PROCEDURE — 99214 OFFICE O/P EST MOD 30 MIN: CPT | Mod: S$GLB,,, | Performed by: STUDENT IN AN ORGANIZED HEALTH CARE EDUCATION/TRAINING PROGRAM

## 2024-01-02 PROCEDURE — 88175 CYTOPATH C/V AUTO FLUID REDO: CPT | Performed by: STUDENT IN AN ORGANIZED HEALTH CARE EDUCATION/TRAINING PROGRAM

## 2024-01-02 NOTE — PROGRESS NOTES
History & Physical  Gynecology      SUBJECTIVE:     Chief Complaint: Consult       History of Present Illness:  Geri is here to discuss pelvic pain  Mirena placed 2018 for PCOS. Gets menses every month. Not painful. Still heavy. On worst day goes through 5.   However, she has started to experience more pains like she had prior to the mirena  Right pelvic pain for the past few months. Daily. Not all day. Comes and goes. Lasts a few seconds. No meds needed because gone quickly. Not related to bowels or urination. No bleeding. Sexually active not at this time. Does desire future child bearing. No vaginal discharge. Not positional pain, no aggravating/alleviating correlations .     Review of patient's allergies indicates:   Allergen Reactions    Eggplant Anaphylaxis, Itching and Other (See Comments)    Percocet [oxycodone-acetaminophen]      States she can tolerate tylenol       Past Medical History:   Diagnosis Date    Allergy     seasonal    Blood clotting tendency     DVT (deep venous thrombosis) 2014    after surgery    Follicular adenoma of thyroid gland 2014    Keloid cicatrix     PCOS (polycystic ovarian syndrome)     PE (pulmonary embolism) 2014    after surgery     Past Surgical History:   Procedure Laterality Date    COLONOSCOPY N/A 2019    Procedure: COLONOSCOPY/Suprep;  Surgeon: Emre Carcamo MD;  Location: [facility];  Service: Endoscopy;  Laterality: N/A;    NASAL SEPTUM SURGERY  2019    Dr. Aceves    THYROID LOBECTOMY Left 2014    and isthmusectomy    TONSILLECTOMY       OB History          0    Para   0    Term   0       0    AB   0    Living   0         SAB   0    IAB   0    Ectopic   0    Multiple   0    Live Births   0               Family History   Problem Relation Age of Onset    Depression Mother     Diabetes Mother     Hypertension Mother     Hypertension Daughter     Heart attack Maternal Grandmother     Hypertension Maternal Grandmother      Colon cancer Maternal Grandmother     Cancer Brother     Heart disease Neg Hx     Anemia Neg Hx     Arrhythmia Neg Hx     Asthma Neg Hx     Clotting disorder Neg Hx     Fainting Neg Hx     Heart failure Neg Hx     Hyperlipidemia Neg Hx     Melanoma Neg Hx     Breast cancer Neg Hx     Ovarian cancer Neg Hx      Social History     Tobacco Use    Smoking status: Some Days     Types: Cigars    Smokeless tobacco: Never   Substance Use Topics    Alcohol use: Yes     Comment: occ    Drug use: Yes     Frequency: 3.0 times per week     Types: Marijuana       Current Outpatient Medications   Medication Sig    busPIRone (BUSPAR) 7.5 MG tablet Take 1 tablet (7.5 mg total) by mouth 2 (two) times daily.    DULoxetine (CYMBALTA) 60 MG capsule TAKE 1 CAPSULE(60 MG) BY MOUTH EVERY DAY    ibuprofen (ADVIL,MOTRIN) 600 MG tablet Take 1 tablet (600 mg total) by mouth every 6 (six) hours as needed for Pain.    ibuprofen (ADVIL,MOTRIN) 800 MG tablet Take 800 mg by mouth 2 (two) times daily as needed.    meloxicam (MOBIC) 15 MG tablet Take 1 tablet (15 mg total) by mouth once daily.    multivitamin capsule Take 1 capsule by mouth once daily.    albuterol (PROVENTIL/VENTOLIN HFA) 90 mcg/actuation inhaler Inhale 2 puffs into the lungs every 4 (four) hours as needed for Wheezing or Shortness of Breath. Rescue (Patient not taking: Reported on 11/22/2023)    diclofenac sodium (VOLTAREN) 1 % Gel Apply 2 g topically once daily. (Patient not taking: Reported on 9/6/2023)    fluconazole (DIFLUCAN) 150 MG Tab Take 1 tablet (150 mg total) by mouth daily as needed (yeast infection). (Patient not taking: Reported on 11/22/2023)    levonorgestrel (MIRENA) 20 mcg/24 hr (5 years) IUD 1 Intra Uterine Device by Intrauterine route once. for 1 dose    loratadine (CLARITIN) 10 mg tablet Take 1 tablet (10 mg total) by mouth once daily.     No current facility-administered medications for this visit.         Review of Systems:  Review of Systems    Gastrointestinal:  Positive for abdominal pain. Negative for blood in stool, constipation and diarrhea.   Genitourinary:  Positive for pelvic pain. Negative for dysmenorrhea, dyspareunia, dysuria, frequency, hematuria, menorrhagia, menstrual problem, vaginal bleeding, vaginal discharge, vaginal pain and vaginal dryness.        OBJECTIVE:     Physical Exam:  Physical Exam  Vitals reviewed.   Constitutional:       General: She is not in acute distress.     Appearance: She is well-developed. She is not diaphoretic.   HENT:      Head: Normocephalic and atraumatic.   Eyes:      Conjunctiva/sclera: Conjunctivae normal.   Cardiovascular:      Rate and Rhythm: Normal rate.   Pulmonary:      Effort: Pulmonary effort is normal.   Abdominal:      General: There is no distension.      Palpations: Abdomen is soft. There is no mass.      Tenderness: There is no abdominal tenderness. There is no guarding or rebound.   Genitourinary:     Labia:         Right: No rash, tenderness, lesion or injury.         Left: No rash, tenderness, lesion or injury.       Vagina: No signs of injury and foreign body. No vaginal discharge, erythema, tenderness or bleeding.      Cervix: No cervical motion tenderness, discharge or friability.      Uterus: Not deviated, not enlarged, not fixed and not tender.       Adnexa:         Right: No mass, tenderness or fullness.          Left: No mass, tenderness or fullness.     Musculoskeletal:         General: Normal range of motion.      Cervical back: Normal range of motion.   Skin:     General: Skin is warm and dry.   Neurological:      Mental Status: She is alert.           ASSESSMENT:     No diagnosis found.       Plan:      Discussed the many possible etiologies for pelvic pain including, but not limited to GI/ issues,GYN issues, MSK issues.   Pap smear collected  GC/CT collected  Urine dip negative  Will start with remove and replace of IUD  If still an issue, consider TVUS to assess for adnexal  masses  If still negative, consider PFPT    Face to Face time with patient: 30    45 minutes of total time spent on the encounter, which includes face to face time and non-face to face time preparing to see the patient (eg, review of tests), Obtaining and/or reviewing separately obtained history, Documenting clinical information in the electronic or other health record, Independently interpreting results (not separately reported) and communicating results to the patient/family/caregiver, or Care coordination (not separately reported).        Dyan Castorena

## 2024-01-03 LAB
C TRACH DNA SPEC QL NAA+PROBE: NOT DETECTED
N GONORRHOEA DNA SPEC QL NAA+PROBE: NOT DETECTED

## 2024-01-09 LAB
HPV HR 12 DNA SPEC QL NAA+PROBE: POSITIVE
HPV16 AG SPEC QL: NEGATIVE
HPV18 DNA SPEC QL NAA+PROBE: NEGATIVE

## 2024-01-11 LAB
FINAL PATHOLOGIC DIAGNOSIS: NORMAL
Lab: NORMAL

## 2024-01-12 ENCOUNTER — TELEPHONE (OUTPATIENT)
Dept: OBSTETRICS AND GYNECOLOGY | Facility: CLINIC | Age: 39
End: 2024-01-12
Payer: COMMERCIAL

## 2024-01-16 ENCOUNTER — PROCEDURE VISIT (OUTPATIENT)
Dept: OBSTETRICS AND GYNECOLOGY | Facility: CLINIC | Age: 39
End: 2024-01-16
Payer: COMMERCIAL

## 2024-01-16 VITALS
SYSTOLIC BLOOD PRESSURE: 121 MMHG | BODY MASS INDEX: 34.84 KG/M2 | WEIGHT: 249.81 LBS | DIASTOLIC BLOOD PRESSURE: 78 MMHG

## 2024-01-16 DIAGNOSIS — Z97.5 IUD (INTRAUTERINE DEVICE) IN PLACE: Primary | ICD-10-CM

## 2024-01-16 DIAGNOSIS — Z30.430 ENCOUNTER FOR IUD INSERTION: ICD-10-CM

## 2024-01-16 PROCEDURE — 99499 UNLISTED E&M SERVICE: CPT | Mod: S$GLB,,, | Performed by: STUDENT IN AN ORGANIZED HEALTH CARE EDUCATION/TRAINING PROGRAM

## 2024-01-16 PROCEDURE — 58300 INSERT INTRAUTERINE DEVICE: CPT | Mod: S$GLB,,, | Performed by: STUDENT IN AN ORGANIZED HEALTH CARE EDUCATION/TRAINING PROGRAM

## 2024-01-16 PROCEDURE — 58301 REMOVE INTRAUTERINE DEVICE: CPT | Mod: S$GLB,,, | Performed by: STUDENT IN AN ORGANIZED HEALTH CARE EDUCATION/TRAINING PROGRAM

## 2024-01-16 NOTE — PROCEDURES
Insertion of IUD    Date/Time: 1/16/2024 4:15 PM    Performed by: Dyan Castorena MD  Authorized by: Dyan Castorena MD    Consent:     Consent obtained:  Prior to procedure the appropriate consent was completed and verified    Consent given by:  Patient    Procedure risks and benefits discussed: yes      Patient questions answered: yes      Patient agrees, verbalizes understanding, and wants to proceed: yes     Device to be inserted was verified by patient: yes    Educational handouts given: yes      Instructions and paperwork completed: yes    Insertion Procedure:   1 Intra Uterine Device levonorgestreL 21 mcg/24 hours (8 yrs) 52 mg       Negative urine pregnancy test: on period.      Cervix cleaned and prepped: yes      Speculum placed in vagina: yes      Tenaculum applied to cervix: yes      Uterus sounded: yes      Uterus sound depth (cm):  6.5    IUD inserted with no complications: yes      IUD type:  Mirena    Strings trimmed: yes    Post-procedure:     Patient tolerated procedure well: yes    Comments:      Previous IUD removed intact with IUD hook as strings were not visible

## 2024-02-05 ENCOUNTER — CLINICAL SUPPORT (OUTPATIENT)
Dept: PSYCHIATRY | Facility: CLINIC | Age: 39
End: 2024-02-05
Payer: COMMERCIAL

## 2024-02-05 ENCOUNTER — PATIENT MESSAGE (OUTPATIENT)
Dept: PSYCHIATRY | Facility: CLINIC | Age: 39
End: 2024-02-05
Payer: COMMERCIAL

## 2024-02-05 DIAGNOSIS — Z13.39 ADHD (ATTENTION DEFICIT HYPERACTIVITY DISORDER) EVALUATION: ICD-10-CM

## 2024-02-05 PROCEDURE — 99999 PR PBB SHADOW E&M-EST. PATIENT-LVL I: CPT | Mod: PBBFAC,,,

## 2024-02-05 PROCEDURE — 99499 UNLISTED E&M SERVICE: CPT | Mod: S$GLB,,, | Performed by: PSYCHOLOGIST

## 2024-02-05 NOTE — PROGRESS NOTES
ADHD Clinic Orientation Seminar       Patient's attendance: Attended in Full     Group Focus: ADHD Clinic Orientation Seminar    Group Start Time:  4:00 PM  Group End Time:   4:35 PM  Groups Date: 02/05/2024   Group Topic:  Behavioral Health  Group Department: [facility]  Group Facilitators:  Aury Licona, PhD; Eliza Lipscomb

## 2024-02-26 ENCOUNTER — CLINICAL SUPPORT (OUTPATIENT)
Dept: PSYCHIATRY | Facility: CLINIC | Age: 39
End: 2024-02-26
Payer: COMMERCIAL

## 2024-02-26 ENCOUNTER — PATIENT MESSAGE (OUTPATIENT)
Dept: PSYCHIATRY | Facility: CLINIC | Age: 39
End: 2024-02-26

## 2024-02-26 DIAGNOSIS — Z13.39 ADHD (ATTENTION DEFICIT HYPERACTIVITY DISORDER) EVALUATION: Primary | ICD-10-CM

## 2024-02-26 PROBLEM — I26.99 PULMONARY EMBOLISM, BILATERAL: Status: RESOLVED | Noted: 2023-11-22 | Resolved: 2024-02-26

## 2024-02-26 PROCEDURE — 99499 UNLISTED E&M SERVICE: CPT | Mod: 95,,, | Performed by: PSYCHOLOGIST
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
## 2024-03-06 ENCOUNTER — PATIENT MESSAGE (OUTPATIENT)
Dept: OBSTETRICS AND GYNECOLOGY | Facility: CLINIC | Age: 39
End: 2024-03-06
Payer: COMMERCIAL

## 2024-03-20 ENCOUNTER — PROCEDURE VISIT (OUTPATIENT)
Dept: OBSTETRICS AND GYNECOLOGY | Facility: CLINIC | Age: 39
End: 2024-03-20
Payer: COMMERCIAL

## 2024-03-20 VITALS
WEIGHT: 245.56 LBS | HEIGHT: 71 IN | BODY MASS INDEX: 34.38 KG/M2 | SYSTOLIC BLOOD PRESSURE: 118 MMHG | DIASTOLIC BLOOD PRESSURE: 74 MMHG

## 2024-03-20 DIAGNOSIS — B97.7 HIGH RISK HPV INFECTION: Primary | ICD-10-CM

## 2024-03-20 PROCEDURE — 57454 BX/CURETT OF CERVIX W/SCOPE: CPT | Mod: S$GLB,,, | Performed by: STUDENT IN AN ORGANIZED HEALTH CARE EDUCATION/TRAINING PROGRAM

## 2024-03-20 PROCEDURE — 88305 TISSUE EXAM BY PATHOLOGIST: CPT | Mod: 26,,, | Performed by: PATHOLOGY

## 2024-03-20 PROCEDURE — 88305 TISSUE EXAM BY PATHOLOGIST: CPT | Performed by: PATHOLOGY

## 2024-03-20 PROCEDURE — 99499 UNLISTED E&M SERVICE: CPT | Mod: S$GLB,,, | Performed by: STUDENT IN AN ORGANIZED HEALTH CARE EDUCATION/TRAINING PROGRAM

## 2024-03-20 NOTE — PROCEDURES
Colposcopy    Date/Time: 3/20/2024 10:45 AM    Performed by: Dyan Castorena MD  Authorized by: Dyan Castorena MD    Consent obatined:  Prior to procedure the appropriate consent was completed and verified  Timeout:Immediately prior to procedure a time out was called to verify the correct patient, procedure, equipment, support staff and site/side marked as required    Colposcopy Site:  Cervix  Acrowhite Lesion? Yes    Atypical Vessels: No    Transformation Zone Adequate?: Yes    Biopsy?: Yes         Location:  Cervix ((3 00))  ECC Performed?: Yes    LEEP Performed?: No    Estimated blood loss (cc):  10   Patient tolerated the procedure well with no immediate complications.   Post-operative instructions were provided for the patient.     Hemostatic after monsels  IUD remained in place

## 2024-03-25 LAB
FINAL PATHOLOGIC DIAGNOSIS: NORMAL
GROSS: NORMAL
Lab: NORMAL

## 2024-04-03 ENCOUNTER — OFFICE VISIT (OUTPATIENT)
Dept: PRIMARY CARE CLINIC | Facility: CLINIC | Age: 39
End: 2024-04-03
Payer: COMMERCIAL

## 2024-04-03 VITALS
HEART RATE: 112 BPM | OXYGEN SATURATION: 99 % | DIASTOLIC BLOOD PRESSURE: 78 MMHG | BODY MASS INDEX: 34.57 KG/M2 | SYSTOLIC BLOOD PRESSURE: 114 MMHG | TEMPERATURE: 99 F | HEIGHT: 71 IN | WEIGHT: 246.94 LBS

## 2024-04-03 DIAGNOSIS — R11.0 INTRACTABLE NAUSEA: ICD-10-CM

## 2024-04-03 DIAGNOSIS — R11.0 INTRACTABLE NAUSEA: Primary | ICD-10-CM

## 2024-04-03 PROCEDURE — 3008F BODY MASS INDEX DOCD: CPT | Mod: CPTII,S$GLB,, | Performed by: STUDENT IN AN ORGANIZED HEALTH CARE EDUCATION/TRAINING PROGRAM

## 2024-04-03 PROCEDURE — 3074F SYST BP LT 130 MM HG: CPT | Mod: CPTII,S$GLB,, | Performed by: STUDENT IN AN ORGANIZED HEALTH CARE EDUCATION/TRAINING PROGRAM

## 2024-04-03 PROCEDURE — 1159F MED LIST DOCD IN RCRD: CPT | Mod: CPTII,S$GLB,, | Performed by: STUDENT IN AN ORGANIZED HEALTH CARE EDUCATION/TRAINING PROGRAM

## 2024-04-03 PROCEDURE — 99999 PR PBB SHADOW E&M-EST. PATIENT-LVL III: CPT | Mod: PBBFAC,,, | Performed by: STUDENT IN AN ORGANIZED HEALTH CARE EDUCATION/TRAINING PROGRAM

## 2024-04-03 PROCEDURE — 3078F DIAST BP <80 MM HG: CPT | Mod: CPTII,S$GLB,, | Performed by: STUDENT IN AN ORGANIZED HEALTH CARE EDUCATION/TRAINING PROGRAM

## 2024-04-03 PROCEDURE — 99214 OFFICE O/P EST MOD 30 MIN: CPT | Mod: S$GLB,,, | Performed by: STUDENT IN AN ORGANIZED HEALTH CARE EDUCATION/TRAINING PROGRAM

## 2024-04-03 RX ORDER — PANTOPRAZOLE SODIUM 40 MG/1
40 TABLET, DELAYED RELEASE ORAL DAILY
Qty: 30 TABLET | Refills: 0 | Status: SHIPPED | OUTPATIENT
Start: 2024-04-03 | End: 2024-05-22

## 2024-04-03 RX ORDER — ONDANSETRON 4 MG/1
4 TABLET, FILM COATED ORAL EVERY 6 HOURS PRN
Qty: 30 TABLET | Refills: 0 | Status: ON HOLD | OUTPATIENT
Start: 2024-04-03 | End: 2024-05-24 | Stop reason: CLARIF

## 2024-04-03 NOTE — TELEPHONE ENCOUNTER
No care due was identified.  [facility] Embedded Care Due Messages. Reference number: [id number].   4/03/2024 3:18:50 PM CDT

## 2024-04-03 NOTE — PROGRESS NOTES
"Primary Care  Office Visit - In Person  4/3/2024      HPI    Patient is a 38 y.o.   Geri Mosqueda  has a past medical history of Allergy, Blood clotting tendency, DVT (deep venous thrombosis) (02/11/2014), Follicular adenoma of thyroid gland (02/06/2014), Keloid cicatrix, PCOS (polycystic ovarian syndrome), and PE (pulmonary embolism) (02/11/2014).    Patient presents with nausea     Following COVID in Feb she has been experiencing nausea daily   Symptoms accompanied by dizziness and whooshing sound in ear. Dizziness does seem to worsen with positional changes   She denies sensation of room spinning     She reports occasional constipation and diarrhea. She does have occasional dysphagia     Coffee intake is rare  Alcohol use is rare about 0-1 drinks/week   She eats spicy food frequently but has adhered to a more bland diet recently       Active Medications:  Current Outpatient Medications   Medication Instructions    busPIRone (BUSPAR) 7.5 mg, Oral, 2 times daily    DULoxetine (CYMBALTA) 60 mg, Oral    loratadine (CLARITIN) 10 mg, Oral, Daily    meloxicam (MOBIC) 15 mg, Oral, Daily    multivitamin capsule 1 capsule, Oral, Daily    ondansetron (ZOFRAN) 4 mg, Oral, Every 6 hours PRN    pantoprazole (PROTONIX) 40 mg, Oral, Daily       Vitals:    04/03/24 1433   BP: 114/78   BP Location: Right arm   Pulse: (!) 112   Temp: 98.6 °F (37 °C)   SpO2: 99%   Weight: 112 kg (246 lb 14.6 oz)   Height: 5' 11" (1.803 m)       Physical Exam  Vitals reviewed.   Constitutional:       General: She is not in acute distress.  Cardiovascular:      Rate and Rhythm: Normal rate and regular rhythm.      Pulses: Normal pulses.      Heart sounds: Normal heart sounds.   Pulmonary:      Effort: Pulmonary effort is normal.      Breath sounds: Normal breath sounds.   Abdominal:      General: Abdomen is flat. Bowel sounds are normal.      Palpations: Abdomen is soft.   Musculoskeletal:      Right lower leg: No edema.      Left lower " leg: No edema.   Neurological:      General: No focal deficit present.      Mental Status: She is oriented to person, place, and time.          Assessment and Plan     1. Intractable nausea  Comments:  GERD v BPPV   Will treat empirically for refluxive symptoms. R/o H pylori   If no improvement consider referral to PT for vestibular therapy  Orders:  -     pantoprazole (PROTONIX) 40 MG tablet; Take 1 tablet (40 mg total) by mouth once daily.  Dispense: 30 tablet; Refill: 0  -     ondansetron (ZOFRAN) 4 MG tablet; Take 1 tablet (4 mg total) by mouth every 6 (six) hours as needed for Nausea.  Dispense: 30 tablet; Refill: 0  -     H. pylori antigen, stool; Future; Expected date: 04/03/2024        Previous labs, records, and notes reviewed and considered for their impact on clinical decision making today.                Upcoming Scheduled Appointments and Follow Up:    Future Appointments   Date Time Provider Department Center   5/3/2024 11:45 AM Monae Chapin MD [facility]       Follow Up [facility] (with who? when?): Follow up in about 1 month (around 5/3/2024) for Virtual Visit.      Extended Emergency Contact Information  Primary Emergency Contact: Annia Mcneill  Address: [address] [place] [address]. [place] [address] [place]  Mobile Phone: [phone]  Relation: Mother  Secondary Emergency Contact: Latrice Lerma  Address: [address] [place] [address]. [place] [address] [place]  Mobile Phone: [phone]  Relation: Roommate      Monae Chapin MD   Internal Medicine  4/3/2024 - 2:45 PM    I spent a total of 20 minutes on the day of the visit.This includes face to face time and non-face to face time preparing to see the patient (eg, review of tests), obtaining and/or reviewing separately obtained history, documenting clinical information in the electronic or other health record, independently interpreting results and  communicating results to the patient/family/caregiver, or care coordinator.    While patients have the right to access their medical record, it is essential to recognize that progress notes primarily serve as a means of communication among healthcare professionals.

## 2024-04-04 RX ORDER — PANTOPRAZOLE SODIUM 40 MG/1
40 TABLET, DELAYED RELEASE ORAL
Qty: 90 TABLET | OUTPATIENT
Start: 2024-04-04

## 2024-04-04 NOTE — TELEPHONE ENCOUNTER
Refill Decision Note   Nevarez Panda  is requesting a refill authorization.  Brief Assessment and Rationale for Refill:  Quick Discontinue     Medication Therapy Plan:         Comments:     Note composed:3:09 PM 04/04/2024

## 2024-04-19 ENCOUNTER — PATIENT MESSAGE (OUTPATIENT)
Dept: PRIMARY CARE CLINIC | Facility: CLINIC | Age: 39
End: 2024-04-19
Payer: COMMERCIAL

## 2024-04-19 DIAGNOSIS — R11.0 INTRACTABLE NAUSEA: Primary | ICD-10-CM

## 2024-04-30 ENCOUNTER — PATIENT MESSAGE (OUTPATIENT)
Dept: PRIMARY CARE CLINIC | Facility: CLINIC | Age: 39
End: 2024-04-30
Payer: COMMERCIAL

## 2024-05-01 ENCOUNTER — OFFICE VISIT (OUTPATIENT)
Dept: PRIMARY CARE CLINIC | Facility: CLINIC | Age: 39
End: 2024-05-01
Payer: COMMERCIAL

## 2024-05-01 DIAGNOSIS — R11.0 NAUSEA: ICD-10-CM

## 2024-05-01 DIAGNOSIS — R42 DIZZINESS: Primary | ICD-10-CM

## 2024-05-01 PROCEDURE — 99214 OFFICE O/P EST MOD 30 MIN: CPT | Mod: 95,,, | Performed by: STUDENT IN AN ORGANIZED HEALTH CARE EDUCATION/TRAINING PROGRAM

## 2024-05-01 RX ORDER — MECLIZINE HYDROCHLORIDE 25 MG/1
25 TABLET ORAL 3 TIMES DAILY PRN
Qty: 30 TABLET | Refills: 1 | Status: SHIPPED | OUTPATIENT
Start: 2024-05-01

## 2024-05-01 NOTE — PROGRESS NOTES
Telehealth Visit    The patient location is: [place]   The chief complaint leading to consultation is: Lab follow up     Visit type: audiovisual    Face to Face time with patient: 10 minutes  15 minutes of total time spent on the encounter, which includes face to face time and non-face to face time preparing to see the patient (eg, review of tests), Obtaining and/or reviewing separately obtained history, Documenting clinical information in the electronic or other health record, Independently interpreting results (not separately reported) and communicating results to the patient/family/caregiver, or Care coordination (not separately reported).       HPI    Patient is a 38 y.o.   Geri Garcia Pleasant  has a past medical history of Allergy, Blood clotting tendency, DVT (deep venous thrombosis) (02/11/2014), Follicular adenoma of thyroid gland (02/06/2014), Keloid cicatrix, PCOS (polycystic ovarian syndrome), and PE (pulmonary embolism) (02/11/2014).    Patient presenting for follow up   She reports that nausea has persisted. Vomiting has improved somewhat  Symptoms are now accompanied by b/l ear itching and ear ache      Active Medications:  Current Outpatient Medications   Medication Instructions    busPIRone (BUSPAR) 7.5 mg, Oral, 2 times daily    DULoxetine (CYMBALTA) 60 mg, Oral    loratadine (CLARITIN) 10 mg, Oral, Daily    meclizine (ANTIVERT) 25 mg, Oral, 3 times daily PRN, Do not take with zofran    meloxicam (MOBIC) 15 mg, Oral, Daily    multivitamin capsule 1 capsule, Oral, Daily    ondansetron (ZOFRAN) 4 mg, Oral, Every 6 hours PRN    pantoprazole (PROTONIX) 40 mg, Oral, Daily       Physical Exam    General: Does not appear to be in acute distress    Assessment and Plan     Unclear if dizziness and nausea could be related to BPPV v primary GI etiology with unrelated ear symptoms     1. Dizziness  -     Ambulatory referral/consult to Physical/Occupational Therapy; Future; Expected date: 05/08/2024  -      meclizine (ANTIVERT) 25 mg tablet; Take 1 tablet (25 mg total) by mouth 3 (three) times daily as needed for Dizziness or Nausea. Do not take with zofran  Dispense: 30 tablet; Refill: 1    2. Nausea  -     Ambulatory referral/consult to Physical/Occupational Therapy; Future; Expected date: 05/08/2024  -     meclizine (ANTIVERT) 25 mg tablet; Take 1 tablet (25 mg total) by mouth 3 (three) times daily as needed for Dizziness or Nausea. Do not take with zofran  Dispense: 30 tablet; Refill: 1                 Upcoming Scheduled Appointments and Follow Up:    Future Appointments   Date Time Provider Department Center   5/17/2024  3:40 PM Almita Bryan NP [facility]       Follow Up [facility] (with who? when?): No follow-ups on file.        Extended Emergency Contact Information  Primary Emergency Contact: Annia Mcneill  Address: [address] [place] [address]. [place] [address] [place]  Mobile Phone: [phone]  Relation: Mother  Secondary Emergency Contact: FlorentinLatrice  Address: [address] [place] [address]. [place] [address] [place]  Mobile Phone: [phone]  Relation: Roommate      Monae Chapin MD   Internal Medicine  5/1/2024 - 11:48 AM        Each patient to whom he or she provides medical services by telemedicine is:  (1) informed of the relationship between the physician and patient and the respective role of any other health care provider with respect to management of the patient; and (2) notified that he or she may decline to receive medical services by telemedicine and may withdraw from such care at any time.    While patients have the right to access their medical record, it is essential to recognize that progress notes primarily serve as a means of communication among healthcare professionals.

## 2024-05-01 NOTE — LETTER
May 2, 2024    Geri Mosqueda  [address] [place] [id number]             [facility]  Primary Care  [address] [facility] [address]  Phone: [phone]  Fax: [phone]   May 2, 2024     Patient: Geri Mosqueda   YOB: 1985   Date of Visit: 5/1/2024       To Whom it May Concern:    Geri Mosqueda was seen in my clinic on 5/1/2024 for ongoing medial assessments and treatments.     Please excuse her from any work missed over the past 6 months.    If you have any questions or concerns, please don't hesitate to call.    Sincerely,         Monae Chapin MD

## 2024-05-02 ENCOUNTER — CLINICAL SUPPORT (OUTPATIENT)
Dept: REHABILITATION | Facility: HOSPITAL | Age: 39
End: 2024-05-02
Attending: STUDENT IN AN ORGANIZED HEALTH CARE EDUCATION/TRAINING PROGRAM
Payer: COMMERCIAL

## 2024-05-02 DIAGNOSIS — R11.0 NAUSEA: ICD-10-CM

## 2024-05-02 DIAGNOSIS — R42 DIZZINESS: ICD-10-CM

## 2024-05-02 PROCEDURE — 97162 PT EVAL MOD COMPLEX 30 MIN: CPT | Mod: PO
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
## 2024-05-03 RX ORDER — BUSPIRONE HYDROCHLORIDE 7.5 MG/1
7.5 TABLET ORAL 2 TIMES DAILY
Qty: 180 TABLET | Refills: 3 | Status: SHIPPED | OUTPATIENT
Start: 2024-05-03

## 2024-05-03 NOTE — TELEPHONE ENCOUNTER
No care due was identified.  [facility] Embedded Care Due Messages. Reference number: [id number].   5/03/2024 5:41:58 AM CDT

## 2024-05-17 ENCOUNTER — CLINICAL SUPPORT (OUTPATIENT)
Dept: REHABILITATION | Facility: HOSPITAL | Age: 39
End: 2024-05-17
Payer: COMMERCIAL

## 2024-05-17 ENCOUNTER — OFFICE VISIT (OUTPATIENT)
Dept: GASTROENTEROLOGY | Facility: CLINIC | Age: 39
End: 2024-05-17
Payer: COMMERCIAL

## 2024-05-17 ENCOUNTER — LAB VISIT (OUTPATIENT)
Dept: LAB | Facility: HOSPITAL | Age: 39
End: 2024-05-17
Payer: COMMERCIAL

## 2024-05-17 VITALS — HEIGHT: 71 IN | WEIGHT: 252 LBS | BODY MASS INDEX: 35.28 KG/M2

## 2024-05-17 DIAGNOSIS — R42 DIZZINESS: Primary | ICD-10-CM

## 2024-05-17 DIAGNOSIS — R11.0 INTRACTABLE NAUSEA: ICD-10-CM

## 2024-05-17 DIAGNOSIS — R11.0 INTRACTABLE NAUSEA: Primary | ICD-10-CM

## 2024-05-17 DIAGNOSIS — R19.8 ABNORMAL BOWEL MOVEMENT: ICD-10-CM

## 2024-05-17 DIAGNOSIS — Z12.11 SCREENING FOR COLON CANCER: ICD-10-CM

## 2024-05-17 LAB — IGA SERPL-MCNC: 148 MG/DL (ref 40–350)

## 2024-05-17 PROCEDURE — 97112 NEUROMUSCULAR REEDUCATION: CPT | Mod: PO

## 2024-05-17 PROCEDURE — 99204 OFFICE O/P NEW MOD 45 MIN: CPT | Mod: S$GLB,,,

## 2024-05-17 PROCEDURE — 97530 THERAPEUTIC ACTIVITIES: CPT | Mod: PO

## 2024-05-17 PROCEDURE — 86364 TISS TRNSGLTMNASE EA IG CLAS: CPT

## 2024-05-17 PROCEDURE — 3008F BODY MASS INDEX DOCD: CPT | Mod: CPTII,S$GLB,,

## 2024-05-17 PROCEDURE — 99999 PR PBB SHADOW E&M-EST. PATIENT-LVL IV: CPT | Mod: PBBFAC,,,

## 2024-05-17 PROCEDURE — 86677 HELICOBACTER PYLORI ANTIBODY: CPT

## 2024-05-17 PROCEDURE — 1159F MED LIST DOCD IN RCRD: CPT | Mod: CPTII,S$GLB,,

## 2024-05-17 PROCEDURE — 82784 ASSAY IGA/IGD/IGG/IGM EACH: CPT

## 2024-05-17 RX ORDER — SODIUM, POTASSIUM,MAG SULFATES 17.5-3.13G
1 SOLUTION, RECONSTITUTED, ORAL ORAL DAILY
Qty: 1 EACH | Refills: 0 | Status: ON HOLD | OUTPATIENT
Start: 2024-05-17 | End: 2024-05-24 | Stop reason: CLARIF

## 2024-05-17 NOTE — PATIENT INSTRUCTIONS
SUPREP Instructions    [facility] [address]  Clinic Office [phone]  Endoscopy Lab [phone]    You are scheduled for a Colonoscopy with Dr. Raphael  on May 24, 2024  at [facility] in [place].  Someone from the hospital will call you 7 days prior to the procedure with an arrival time.  Check in at the Hospital -1st floor, Information desk.   Call [phone] to reschedule.    An adult friend/family member must come with you to drive you home.  You cannot drive, take a taxi, Uber/Lyft or bus to leave the Endoscopy Center alone.  If you do not have someone to drive you home, your test will be cancelled.     Please follow the directions of your doctor if you take any pills that thin your blood. If you take these meds: Aggrenox, Brilinta, Effient, Eliquis, Lovenox, Plavix, Pletal, Pradaxa, Ticilid, Xarelto or Coumadin, let the doctor's office know.    Please hold any GLP-1 medications prior to the procedure: Dulaglutide Trulicity(hold week prior), Exenatide Byetta (hold the morning of procedure), Semaglutide Ozempic (hold week prior), Liraglutide Victoza, Saxenda(hold week prior), Lixisenatide Adlyxin (hold the morning of procedure), Semaglutide Rybelsus (hold the morning of procedure), Tirzepatide Mounjaro (hold week prior)     DON'T: On the morning of the test do not take insulin or pills for diabetes.     DO: On the morning of the test, do take any pills for blood pressure, heart, anti-rejection and or seizures with a small sip of water. Bring any inhalers with you.    To have a good prep, you must follow these instructions - please do not use the directions from the pharmacy.    The doctor will send a prescription for the SUPREP.      The Day Before the test:    How to take prep:    SUPREP Bowel Prep Kit is a (2-day) prep.   Both 6-ounce bottles are required for a complete preparation for colonoscopy. Dilute the solution concentrate as directed prior to use.  You must drink water with each dose of SUPREP, and additional water after each dose.    DOSE 1--Day Before Colonoscopy    Drink at least 6 to 8 glasses of clear liquids from time you wake up until you begin your prep and then continue until bedtime to avoid dehydration.     You CAN have:  Water, Coffee or decaf coffee (no milk or cream)  Tea  Soft drinks - regular and sugar free  Jello (green or yellow)  Apple Juice, white grape juice, white cranberry juice  Gatorade, Power Aid, Crystal Light, Quincy Aid  Lemonade and Limeade  Bouillon, clear soup  Snowball, popsicles  YOU CAN'T DRINK ANYTHING RED, PURPLE ORANGE OR BLUE   YOU CAN'T DRINK ALCOHOL  ONLY DRINK WHAT IS ON THE LIST      5:00 pm:    You must complete Steps 1 through 4 using one (1) 6-ounce bottle before going to bed as shown below:    Step 1-Pour ONE (1) 6-ounce bottle of SUPREP liquid into the mixing container.  Step 2-Add cool drinking water to the 16-ounce line on the container and mix.  Step 3-Drink ALL the liquid in the container.  Step 4-You must drink two (2) more 16-ounce containers of water over the next 1 hour.  IMPORTANT: If you experience preparation-related symptoms (for example, nausea, bloating, or cramping), stop, or slow the rate of drinking the additional water until your symptoms decrease.    DOSE 2--Day of the Colonoscopy _________________ at 2-3 AM.    For this dose, repeat Steps 1 through 4 shown above using the other 6-ounce bottle.   You may continue drinking water/clear liquids until   4 hours before your colonoscopy or as directed by the scheduling nurse      For information about your procedure, two (2) things to view prior to colonoscopy:  Please watch this informational video. It is important to watch this animated consent video prior to your arrival. If you haven't watched the video prior to arriving, you are required to watch it during admission which can causes delays.    Options for viewing:   Using a keyboard:  press and  hold the control tab (Ctrl) and left mouse click to follow links.           Colonoscopy Instructional Video                                                                                   OR    Type link address into your web browser's address bar:  [email or web address]      Educational Booklet with pictures:      Colonoscopy Prep - Liquid      Leave all valuables and jewelry at home. You will be at the hospital for 2-4 hours.    Call the Endoscopy department at [phone] with any questions about your procedure.

## 2024-05-17 NOTE — H&P (VIEW-ONLY)
GASTROENTEROLOGY CLINIC NOTE    Reason for visit: The primary encounter diagnosis was Intractable nausea. Diagnoses of Abnormal bowel movement and Screening for colon cancer were also pertinent to this visit.  Referring provider/PCP: Monae Chapin MD    HPI:  Geri Mosqueda is a 38 y.o. female here today for multiple GI symptoms, she is accompanied by her mom. She reports last September developing nausea, came on gradually and then worsened. Associated vomiting as well. She reports vomiting was severe, multiple times/day, vomiting food and dry heaving. She was started on pantoprazole and anti-nausea medications. She reports no longer vomiting, does have nausea still. Nausea occurs daily, random, not always associated with eating. She denies abd pain or reflux symptoms, no decrease in appetite or early satiety. She does report significant NSAID use months ago d/t chronic HA's. She also reports change in BM's, has never had regular BM's but now alternates between constipation and diarrhea. She is seeing PT for nausea and dizziness. FH of celiac and gastroparesis in mother, CRC in grandmother.     Prior Endoscopy:  EGD:  Colon: 2019 with Dr. Carcamo:   - One 5 mm polyp in the ascending colon, removed with a hot snare. Resected and retrieved.               - The examination was otherwise normal on direct and retroflexion views.              - Repeat colonoscopy in 5 years for surveillance.     PATH:   1. Ascending colon polyp: Colon mucosa: strips of non-adenomatous colonic mucosa, benign mucosal lymphoid aggregate.     (Portions of this note were dictated using voice recognition software and may contain dictation related errors in spelling/grammar/syntax not found on text review)    Review of Systems   Gastrointestinal:  Positive for nausea and vomiting (resolved). Negative for abdominal pain and heartburn.       Past Medical History: has a past medical history of Allergy, Blood clotting tendency,  "DVT (deep venous thrombosis), Follicular adenoma of thyroid gland, Keloid cicatrix, PCOS (polycystic ovarian syndrome), and PE (pulmonary embolism).    Past Surgical History: has a past surgical history that includes Tonsillectomy; Thyroid lobectomy (Left, 2/6/2014); Colonoscopy (N/A, 7/30/2019); and Nasal septum surgery (03/2019).    Home medications:   Current Outpatient Medications on File Prior to Visit   Medication Sig Dispense Refill    busPIRone (BUSPAR) 7.5 MG tablet TAKE 1 TABLET(7.5 MG) BY MOUTH TWICE DAILY 180 tablet 3    DULoxetine (CYMBALTA) 60 MG capsule TAKE 1 CAPSULE(60 MG) BY MOUTH EVERY DAY 90 capsule 3    meclizine (ANTIVERT) 25 mg tablet Take 1 tablet (25 mg total) by mouth 3 (three) times daily as needed for Dizziness or Nausea. Do not take with zofran 30 tablet 1    meloxicam (MOBIC) 15 MG tablet Take 1 tablet (15 mg total) by mouth once daily. 30 tablet 0    multivitamin capsule Take 1 capsule by mouth once daily.      pantoprazole (PROTONIX) 40 MG tablet Take 1 tablet (40 mg total) by mouth once daily. 30 tablet 0    loratadine (CLARITIN) 10 mg tablet Take 1 tablet (10 mg total) by mouth once daily. 30 tablet 11    ondansetron (ZOFRAN) 4 MG tablet Take 1 tablet (4 mg total) by mouth every 6 (six) hours as needed for Nausea. (Patient not taking: Reported on 5/17/2024) 30 tablet 0     Current Facility-Administered Medications on File Prior to Visit   Medication Dose Route Frequency Provider Last Rate Last Admin    levonorgestreL (MIRENA) 21 mcg/24 hours (8 yrs) 52 mg IUD 1 Intra Uterine Device  1 Intra Uterine Device Intrauterine  [place], Dyan AGUILAR MD   1 Intra Uterine Device at 01/16/24 1615       Vital signs:  Ht 5' 11" (1.803 m)   Wt 114.3 kg (251 lb 15.8 oz)   BMI 35.14 kg/m²     Physical Exam  Constitutional:       Appearance: Normal appearance. She is obese.   Abdominal:      General: There is no distension.   Neurological:      Mental Status: She is alert.       I have reviewed " associated labs, imaging and notes.     Assessment:  1. Intractable nausea    2. Abnormal bowel movement    3. Screening for colon cancer    Nausea, daily since September   Vomiting- severe, improved with PPI and anti-nausea med   Still daily nausea- random  No abd pain, no reflux, no change in appetite/early satiety  Continue avoiding NSAID's  Colon 2019- 5 year recall    Check blood work and schedule EGD to evaluate for causes of nausea. Continue taking pantoprazole. Will add colonoscopy- due for surveillance purposes and evaluate for change in BM's.     Plan:  Orders Placed This Encounter    H. pylori Antibody, IgG    IgA    Tissue Transglutaminase, IgA    sodium,potassium,mag sulfates (SUPREP BOWEL PREP KIT) 17.5-3.13-1.6 gram SolR    Case Request Endoscopy: COLONOSCOPY, EGD (ESOPHAGOGASTRODUODENOSCOPY)     Check blood work  Schedule EGD/colonoscopy   Suprep     Continue pantoprazole as previously prescribed     Lindsey Ray, NP [facility]  [place]

## 2024-05-17 NOTE — PROGRESS NOTES
"[facility]   Physical Therapy Treatment Note      Name: Geri Garcia [facility] Number: [id number]    Therapy Diagnosis:   Encounter Diagnosis   Name Primary?    Dizziness Yes     Physician: Monae Chapin MD    Visit Date: 5/17/2024    Physician Orders: PT Eval and Treat   Medical Diagnosis from Referral: Dizziness; nausea  Evaluation Date: 5/2/2024  Authorization Period Expiration: 12/31/24  Plan of Care Expiration: 06/28/24  Visit # / Visits authorized: 01/ 20 + eval     Time In: 08:07  Time Out: 08:45  Total Billable Time: 38 minutes     Precautions: Standard    PTA Visit #: 0/5       Subjective     Patient reports: that she just got back into town from [place] on Wednesday so still dealing with some jet lag. Dizziness remains about the same as it was on evaluation.   Nausea still present but vomiting has subsided. Did not take meclizine prior to today's session.     Home exercise program not provided this date.   Response to previous treatment: no adverse response  Functional change: ongoing    Pain: 0/10  Location: NA    Objective      Functional Gait Assessment:   1. Gait on level surface =  3  2. Change in Gait Speed = 3  3. Gait with horizontal head turns  = 2  4. Gait with vertical head turns = 2, increased dizziness  5. Gait with pivot turns = 2  6. Step over obstacle = 3  7. Gait with Narrow ESAU = 1  8. Gait with eyes closed = 3  9. Ambulating Backwards = 3  10. Steps = 3    Score 25/30       Treatment     Nevarez received the treatments listed below:      therapeutic exercises to develop strength, endurance, ROM, flexibility, posture, and core stabilization for 00 minutes including:  NP    neuromuscular re-education activities to improve: Balance, Coordination, and Sense for 28 minutes. The following activities were included:  Time above includes time spent on objective testing.     Oculomotor /Gaze Stabilization Exercises:  Smooth Pursuits: x target  2 x 30" " "horizontal  2 x 30" vertical    Saccades: x targets  3 x 30" horizontal, self selected speed    VOR 1: x target  2 x 30" horizontal  2 x 30" vertical    Balance:    Foam pad: SBA  3 x 30" stance with eyes closed    Fitter board:  2 x 30" each lower extremity single leg stance with alternate lower extremity on fitter board, eyes closed, SBA    therapeutic activities to improve functional performance for 10 minutes, including:  Ambulation with head movements: SBA  2 x 100 feet ambulation in hallway with right<>left head movements  2 x 100 feet ambulation in hallway with up <> down head movements      Patient Education and Home Exercises       Education provided:   - plan of care (POC), HEP    Written Home Exercises Provided: yes. Exercises were reviewed and Geri was able to demonstrate them prior to the end of the session.  Geri demonstrated good  understanding of the education provided. See Electronic Medical Record under Patient Instructions for exercises provided during therapy session on 05/17/24.     Assessment     Geri tolerated first follow up session well. Functional Gait Assessment completed for formal dynamic balance assessment and motion tolerance. Good baseline performance noted on Functional Gait Assessment with current score placing patient out of elevated fall risk category, but most difficulty noted on vestibular biased conditions. Home exercise program also provided for oculomotor/gaze stabilization and motion tolerance. Patient demonstrates good understanding of exercises reviewed. Plan to progress oculomotor, motion tolerance, and balance activities as tolerated.     Geri Is progressing well towards her goals.   Patient prognosis is Good.     Patient will continue to benefit from skilled outpatient physical therapy to address the deficits listed in the problem list box on initial evaluation, provide pt/family education and to maximize pt's level of independence in the home and " community environment.     Patient's spiritual, cultural and educational needs considered and pt agreeable to plan of care and goals.     Anticipated barriers to physical therapy: co-morbidities     Goals:  Short Term Goals: 4 weeks   Patient to be (I) with established home exercise program. Ongoing  Patient to perform smooth pursuits tracking single target in all planes WFL for improved ability to scan environment. Ongoing  Patient to perform VOR 1 at 80 bpm WFL for improved gaze stabilization. Ongoing  Patient to perform VOR cancellation at 50 bpm with no more than 4/10 increase in symptoms for improved motion sensitivity. Ongoing  Patient to pass GST condition 2 with no significant sway for improved balance in low vision environments. Ongoing  Patient to improve GST condition 4 to at least 10 sec for improved balance in low vision environments. Ongoing  PT for formally assess Functional Gait Assessment and establish appropriate goal. MET 05/17/24     Long Term Goals: 8 weeks   Patient to be (I) with advanced home exercise program. Ongoing  Patient to perform smooth pursuits tracking with reading component in all planes WFL for improved ability to scan environment. Ongoing  Patient to perform VOR 1 at 100 bpm WFL for improved gaze stabilization. Ongoing  Patient to perform VOR cancellation at 50 bpm with no more than 2/10 increase in symptoms for improved motion sensitivity. Ongoing  Patient to pass GST condition 6 with no significant sway for improved balance in low vision environments. Ongoing  Patient to improve Fukuda step test score to </20 deg bias for improved vestibular system function symmetry. Ongoing  Updated 05/17/24: Patient to improve Functional Gait Assessment score to at least 28/30 for improved balance in community environments. Ongoing    Plan     Progress oculomotor, motion tolerance, and balance activities as tolerated.     Maria Luz Rios, PT

## 2024-05-17 NOTE — PROGRESS NOTES
GASTROENTEROLOGY CLINIC NOTE    Reason for visit: The primary encounter diagnosis was Intractable nausea. Diagnoses of Abnormal bowel movement and Screening for colon cancer were also pertinent to this visit.  Referring provider/PCP: Monae Chapin MD    HPI:  Geri Mosqueda is a 38 y.o. female here today for multiple GI symptoms, she is accompanied by her mom. She reports last September developing nausea, came on gradually and then worsened. Associated vomiting as well. She reports vomiting was severe, multiple times/day, vomiting food and dry heaving. She was started on pantoprazole and anti-nausea medications. She reports no longer vomiting, does have nausea still. Nausea occurs daily, random, not always associated with eating. She denies abd pain or reflux symptoms, no decrease in appetite or early satiety. She does report significant NSAID use months ago d/t chronic HA's. She also reports change in BM's, has never had regular BM's but now alternates between constipation and diarrhea. She is seeing PT for nausea and dizziness. FH of celiac and gastroparesis in mother, CRC in grandmother.     Prior Endoscopy:  EGD:  Colon: 2019 with Dr. Carcamo:   - One 5 mm polyp in the ascending colon, removed with a hot snare. Resected and retrieved.               - The examination was otherwise normal on direct and retroflexion views.              - Repeat colonoscopy in 5 years for surveillance.     PATH:   1. Ascending colon polyp: Colon mucosa: strips of non-adenomatous colonic mucosa, benign mucosal lymphoid aggregate.     (Portions of this note were dictated using voice recognition software and may contain dictation related errors in spelling/grammar/syntax not found on text review)    Review of Systems   Gastrointestinal:  Positive for nausea and vomiting (resolved). Negative for abdominal pain and heartburn.       Past Medical History: has a past medical history of Allergy, Blood clotting tendency,  "DVT (deep venous thrombosis), Follicular adenoma of thyroid gland, Keloid cicatrix, PCOS (polycystic ovarian syndrome), and PE (pulmonary embolism).    Past Surgical History: has a past surgical history that includes Tonsillectomy; Thyroid lobectomy (Left, 2/6/2014); Colonoscopy (N/A, 7/30/2019); and Nasal septum surgery (03/2019).    Home medications:   Current Outpatient Medications on File Prior to Visit   Medication Sig Dispense Refill    busPIRone (BUSPAR) 7.5 MG tablet TAKE 1 TABLET(7.5 MG) BY MOUTH TWICE DAILY 180 tablet 3    DULoxetine (CYMBALTA) 60 MG capsule TAKE 1 CAPSULE(60 MG) BY MOUTH EVERY DAY 90 capsule 3    meclizine (ANTIVERT) 25 mg tablet Take 1 tablet (25 mg total) by mouth 3 (three) times daily as needed for Dizziness or Nausea. Do not take with zofran 30 tablet 1    meloxicam (MOBIC) 15 MG tablet Take 1 tablet (15 mg total) by mouth once daily. 30 tablet 0    multivitamin capsule Take 1 capsule by mouth once daily.      pantoprazole (PROTONIX) 40 MG tablet Take 1 tablet (40 mg total) by mouth once daily. 30 tablet 0    loratadine (CLARITIN) 10 mg tablet Take 1 tablet (10 mg total) by mouth once daily. 30 tablet 11    ondansetron (ZOFRAN) 4 MG tablet Take 1 tablet (4 mg total) by mouth every 6 (six) hours as needed for Nausea. (Patient not taking: Reported on 5/17/2024) 30 tablet 0     Current Facility-Administered Medications on File Prior to Visit   Medication Dose Route Frequency Provider Last Rate Last Admin    levonorgestreL (MIRENA) 21 mcg/24 hours (8 yrs) 52 mg IUD 1 Intra Uterine Device  1 Intra Uterine Device Intrauterine  [place], Dyan AGUILAR MD   1 Intra Uterine Device at 01/16/24 1615       Vital signs:  Ht 5' 11" (1.803 m)   Wt 114.3 kg (251 lb 15.8 oz)   BMI 35.14 kg/m²     Physical Exam  Constitutional:       Appearance: Normal appearance. She is obese.   Abdominal:      General: There is no distension.   Neurological:      Mental Status: She is alert.       I have reviewed " associated labs, imaging and notes.     Assessment:  1. Intractable nausea    2. Abnormal bowel movement    3. Screening for colon cancer    Nausea, daily since September   Vomiting- severe, improved with PPI and anti-nausea med   Still daily nausea- random  No abd pain, no reflux, no change in appetite/early satiety  Continue avoiding NSAID's  Colon 2019- 5 year recall    Check blood work and schedule EGD to evaluate for causes of nausea. Continue taking pantoprazole. Will add colonoscopy- due for surveillance purposes and evaluate for change in BM's.     Plan:  Orders Placed This Encounter    H. pylori Antibody, IgG    IgA    Tissue Transglutaminase, IgA    sodium,potassium,mag sulfates (SUPREP BOWEL PREP KIT) 17.5-3.13-1.6 gram SolR    Case Request Endoscopy: COLONOSCOPY, EGD (ESOPHAGOGASTRODUODENOSCOPY)     Check blood work  Schedule EGD/colonoscopy   Suprep     Continue pantoprazole as previously prescribed     Lindsey Ray, NP [facility]  [place]

## 2024-05-20 DIAGNOSIS — A04.8 H. PYLORI INFECTION: Primary | ICD-10-CM

## 2024-05-20 LAB
H PYLORI IGG SERPL QL IA: POSITIVE
TTG IGA SER-ACNC: 0.3 U/ML

## 2024-05-20 RX ORDER — METRONIDAZOLE 250 MG/1
250 TABLET ORAL EVERY 6 HOURS
Qty: 56 TABLET | Refills: 0 | Status: SHIPPED | OUTPATIENT
Start: 2024-05-20 | End: 2024-06-03

## 2024-05-20 RX ORDER — TETRACYCLINE HYDROCHLORIDE 500 MG/1
500 CAPSULE ORAL EVERY 6 HOURS
Qty: 56 CAPSULE | Refills: 0 | Status: SHIPPED | OUTPATIENT
Start: 2024-05-20 | End: 2024-06-03

## 2024-05-21 DIAGNOSIS — R11.0 INTRACTABLE NAUSEA: ICD-10-CM

## 2024-05-21 NOTE — TELEPHONE ENCOUNTER
Refill Routing Note   Medication(s) are not appropriate for processing by [facility] for the following reason(s):        New or recently adjusted medication    [facility] action(s):  Defer               Appointments  past 12m or future 3m with PCP    Date Provider   Last Visit   5/1/2024 Monae Chapin MD   Next Visit   Visit date not found Monae Chapin MD   ED visits in past 90 days: 0        Note composed:6:53 PM 05/21/2024

## 2024-05-21 NOTE — TELEPHONE ENCOUNTER
No care due was identified.  [facility] Embedded Care Due Messages. Reference number: [id number].   5/21/2024 9:07:48 AM CDT

## 2024-05-22 RX ORDER — PANTOPRAZOLE SODIUM 40 MG/1
40 TABLET, DELAYED RELEASE ORAL
Qty: 90 TABLET | Refills: 3 | Status: SHIPPED | OUTPATIENT
Start: 2024-05-22

## 2024-05-23 ENCOUNTER — TELEPHONE (OUTPATIENT)
Dept: ENDOSCOPY | Facility: HOSPITAL | Age: 39
End: 2024-05-23
Payer: COMMERCIAL

## 2024-05-23 DIAGNOSIS — Z12.11 SPECIAL SCREENING FOR MALIGNANT NEOPLASMS, COLON: Primary | ICD-10-CM

## 2024-05-23 NOTE — TELEPHONE ENCOUNTER
Contacted Pt for endoscopy pre-call for upcoming procedure.  Pre-call complete, Pt does not have any further questions. Arrival time: 9:15AM

## 2024-05-24 ENCOUNTER — HOSPITAL ENCOUNTER (OUTPATIENT)
Facility: HOSPITAL | Age: 39
Discharge: HOME | End: 2024-05-24
Attending: INTERNAL MEDICINE | Admitting: INTERNAL MEDICINE
Payer: COMMERCIAL

## 2024-05-24 ENCOUNTER — ANESTHESIA (OUTPATIENT)
Dept: ENDOSCOPY | Facility: HOSPITAL | Age: 39
End: 2024-05-24
Payer: COMMERCIAL

## 2024-05-24 ENCOUNTER — ANESTHESIA EVENT (OUTPATIENT)
Dept: ENDOSCOPY | Facility: HOSPITAL | Age: 39
End: 2024-05-24
Payer: COMMERCIAL

## 2024-05-24 VITALS
OXYGEN SATURATION: 99 % | WEIGHT: 250 LBS | DIASTOLIC BLOOD PRESSURE: 78 MMHG | HEART RATE: 76 BPM | SYSTOLIC BLOOD PRESSURE: 128 MMHG | HEIGHT: 71 IN | TEMPERATURE: 98 F | BODY MASS INDEX: 35 KG/M2 | RESPIRATION RATE: 18 BRPM

## 2024-05-24 DIAGNOSIS — Z86.010 PERSONAL HISTORY OF COLONIC POLYPS: ICD-10-CM

## 2024-05-24 LAB
B-HCG UR QL: NEGATIVE
CTP QC/QA: YES

## 2024-05-24 PROCEDURE — D9220A PRA ANESTHESIA: Mod: 33,CRNA,, | Performed by: NURSE ANESTHETIST, CERTIFIED REGISTERED

## 2024-05-24 PROCEDURE — 27201012 HC FORCEPS, HOT/COLD, DISP: Performed by: INTERNAL MEDICINE

## 2024-05-24 PROCEDURE — 88305 TISSUE EXAM BY PATHOLOGIST: CPT | Mod: 26,,, | Performed by: PATHOLOGY

## 2024-05-24 PROCEDURE — 25000003 PHARM REV CODE 250: Performed by: NURSE ANESTHETIST, CERTIFIED REGISTERED

## 2024-05-24 PROCEDURE — 63600175 PHARM REV CODE 636 W HCPCS: Performed by: NURSE ANESTHETIST, CERTIFIED REGISTERED

## 2024-05-24 PROCEDURE — 45380 COLONOSCOPY AND BIOPSY: CPT | Mod: 33,59,, | Performed by: INTERNAL MEDICINE

## 2024-05-24 PROCEDURE — 25000003 PHARM REV CODE 250: Performed by: INTERNAL MEDICINE

## 2024-05-24 PROCEDURE — 45385 COLONOSCOPY W/LESION REMOVAL: CPT | Mod: 33,,, | Performed by: INTERNAL MEDICINE

## 2024-05-24 PROCEDURE — 88305 TISSUE EXAM BY PATHOLOGIST: CPT | Mod: 59 | Performed by: PATHOLOGY

## 2024-05-24 PROCEDURE — 43239 EGD BIOPSY SINGLE/MULTIPLE: CPT | Mod: 51,,, | Performed by: INTERNAL MEDICINE

## 2024-05-24 PROCEDURE — 37000009 HC ANESTHESIA EA ADD 15 MINS: Performed by: INTERNAL MEDICINE

## 2024-05-24 PROCEDURE — 81025 URINE PREGNANCY TEST: CPT | Performed by: INTERNAL MEDICINE

## 2024-05-24 PROCEDURE — 27201089 HC SNARE, DISP (ANY): Performed by: INTERNAL MEDICINE

## 2024-05-24 PROCEDURE — 43239 EGD BIOPSY SINGLE/MULTIPLE: CPT | Performed by: INTERNAL MEDICINE

## 2024-05-24 PROCEDURE — 37000008 HC ANESTHESIA 1ST 15 MINUTES: Performed by: INTERNAL MEDICINE

## 2024-05-24 PROCEDURE — D9220A PRA ANESTHESIA: Mod: 33,ANES,, | Performed by: STUDENT IN AN ORGANIZED HEALTH CARE EDUCATION/TRAINING PROGRAM

## 2024-05-24 RX ORDER — PROPOFOL 10 MG/ML
VIAL (ML) INTRAVENOUS CONTINUOUS PRN
Status: DISCONTINUED | OUTPATIENT
Start: 2024-05-24 | End: 2024-05-24

## 2024-05-24 RX ORDER — SODIUM CHLORIDE 0.9 % (FLUSH) 0.9 %
10 SYRINGE (ML) INJECTION
Status: DISCONTINUED | OUTPATIENT
Start: 2024-05-24 | End: 2024-05-24 | Stop reason: HOSPADM

## 2024-05-24 RX ORDER — PROPOFOL 10 MG/ML
VIAL (ML) INTRAVENOUS
Status: DISCONTINUED | OUTPATIENT
Start: 2024-05-24 | End: 2024-05-24

## 2024-05-24 RX ORDER — DEXTROMETHORPHAN/PSEUDOEPHED 2.5-7.5/.8
DROPS ORAL
Status: COMPLETED | OUTPATIENT
Start: 2024-05-24 | End: 2024-05-24

## 2024-05-24 RX ORDER — SODIUM CHLORIDE 9 MG/ML
INJECTION, SOLUTION INTRAVENOUS CONTINUOUS
Status: DISCONTINUED | OUTPATIENT
Start: 2024-05-24 | End: 2024-05-24 | Stop reason: HOSPADM

## 2024-05-24 RX ADMIN — SODIUM CHLORIDE: 0.9 INJECTION, SOLUTION INTRAVENOUS at 12:05

## 2024-05-24 RX ADMIN — SODIUM CHLORIDE: 9 INJECTION, SOLUTION INTRAVENOUS at 10:05

## 2024-05-24 RX ADMIN — PROPOFOL 100 MCG/KG/MIN: 10 INJECTION, EMULSION INTRAVENOUS at 12:05

## 2024-05-24 RX ADMIN — PROPOFOL 120 MG: 10 INJECTION, EMULSION INTRAVENOUS at 12:05

## 2024-05-24 NOTE — PROVATION PATIENT INSTRUCTIONS
Discharge Summary/Instructions after an Endoscopic Procedure  Patient Name: Geri Mosqueda  Patient MRN: [id number]  Patient YOB: 1985  Friday, May 24, 2024  Catalino Raphael MD  Dear patient,  As a result of recent federal legislation (The Federal Cures Act), you may   receive lab or pathology results from your procedure in your [facility]   account before your physician is able to contact you. Your physician or   their representative will relay the results to you with their   recommendations at their soonest availability.  Thank you,  Your health is very important to us during the Covid Crisis. Following your   procedure today, you will receive a daily text for 2 weeks asking about   signs or symptoms of Covid 19.  Please respond to this text when you   receive it so we can follow up and keep you as safe as possible.   RESTRICTIONS:  During your procedure today, you received medications for sedation.  These   medications may affect your judgment, balance and coordination.  Therefore,   for 24 hours, you have the following restrictions:   - DO NOT drive a car, operate machinery, make legal/financial decisions,   sign important papers or drink alcohol.    ACTIVITY:  Today: no heavy lifting, straining or running due to procedural   sedation/anesthesia.  The following day: return to full activity including work.  DIET:  Eat and drink normally unless instructed otherwise.     TREATMENT FOR COMMON SIDE EFFECTS:  - Mild abdominal pain, nausea, belching, bloating or excessive gas:  rest,   eat lightly and use a heating pad.  - Sore Throat: treat with throat lozenges and/or gargle with warm salt   water.  - Because air was used during the procedure, expelling large amounts of air   from your rectum or belching is normal.  - If a bowel prep was taken, you may not have a bowel movement for 1-3 days.    This is normal.  SYMPTOMS TO WATCH FOR AND REPORT TO YOUR PHYSICIAN:  1. Abdominal pain or bloating, other than  gas cramps.  2. Chest pain.  3. Back pain.  4. Signs of infection such as: chills or fever occurring within 24 hours   after the procedure.  5. Rectal bleeding, which would show as bright red, maroon, or black stools.   (A tablespoon of blood from the rectum is not serious, especially if   hemorrhoids are present.)  6. Vomiting.  7. Weakness or dizziness.  GO DIRECTLY TO THE NEAREST EMERGENCY ROOM IF YOU HAVE ANY OF THE FOLLOWING:      Difficulty breathing              Chills and/or fever over 101 F   Persistent vomiting and/or vomiting blood   Severe abdominal pain   Severe chest pain   Black, tarry stools   Bleeding- more than one tablespoon   Any other symptom or condition that you feel may need urgent attention  Your doctor recommends these additional instructions:  If any biopsies were taken, your doctors clinic will contact you in 1 to 2   weeks with any results.  - Discharge patient to home.   - Patient has a contact number available for emergencies.  The signs and   symptoms of potential delayed complications were discussed with the   patient.  Return to normal activities tomorrow.  Written discharge   instructions were provided to the patient.   - Resume previous diet.   - Continue present medications.   - Await pathology results.   - Repeat colonoscopy in 5 years for surveillance.   - (await path for rectosigmoid biopsies, consider flex sig in interval if   pathology concerning, suspected benign changes)  For questions, problems or results please call your physician - Catalino Raphael MD.  EMERGENCY PHONE NUMBER: [phone],  LAB RESULTS: [phone]  IF A COMPLICATION OR EMERGENCY SITUATION ARISES AND YOU ARE UNABLE TO REACH   YOUR PHYSICIAN - GO DIRECTLY TO THE EMERGENCY ROOM.  Catalino Raphael MD  5/24/2024 12:39:18 PM  This report has been verified and signed electronically.  Dear patient,  As a result of recent federal legislation (The Federal Cures Act), you may   receive lab or pathology  results from your procedure in your [facility]   account before your physician is able to contact you. Your physician or   their representative will relay the results to you with their   recommendations at their soonest availability.  Thank you,  PROVATION

## 2024-05-24 NOTE — ANESTHESIA PREPROCEDURE EVALUATION
[facility]  Anesthesia Pre-Operative Evaluation         Patient Name: Geri Mosqueda  YOB: 1985  MRN: [id number]    SUBJECTIVE:     05/24/2024    Procedure(s) (LRB):  COLONOSCOPY (N/A)  EGD (ESOPHAGOGASTRODUODENOSCOPY) (N/A)    Geri Mosqueda is a 38 y.o. female here for Procedure(s) (LRB):  COLONOSCOPY (N/A)  EGD (ESOPHAGOGASTRODUODENOSCOPY) (N/A)    Drips:     Patient Active Problem List   Diagnosis    History of pulmonary embolism    Primary insomnia    Obesity (BMI 30-39.9)    Family history of diabetes mellitus in mother    Anxiety    Chronic pain of left knee    Acute pain of left knee    Impaired mobility    History of DVT of lower extremity    LAYNE (obstructive sleep apnea)    Screening for malignant neoplasm of colon    Chronic left-sided low back pain    Leg weakness, bilateral    Back stiffness    Dizziness       Review of patient's allergies indicates:   Allergen Reactions    Eggplant Anaphylaxis, Itching and Other (See Comments)    Percocet [oxycodone-acetaminophen]      States she can tolerate tylenol       Current Facility-Administered Medications on File Prior to Encounter   Medication Dose Route Frequency Provider Last Rate Last Admin    levonorgestreL (MIRENA) 21 mcg/24 hours (8 yrs) 52 mg IUD 1 Intra Uterine Device  1 Intra Uterine Device Intrauterine  Nye, Dyan AGUILAR MD   1 Intra Uterine Device at 01/16/24 1615     Current Outpatient Medications on File Prior to Encounter   Medication Sig Dispense Refill    busPIRone (BUSPAR) 7.5 MG tablet TAKE 1 TABLET(7.5 MG) BY MOUTH TWICE DAILY 180 tablet 3    DULoxetine (CYMBALTA) 60 MG capsule TAKE 1 CAPSULE(60 MG) BY MOUTH EVERY DAY 90 capsule 3    loratadine (CLARITIN) 10 mg tablet Take 1 tablet (10 mg total) by mouth once daily. 30 tablet 11    meclizine (ANTIVERT) 25 mg tablet Take 1 tablet (25 mg total) by mouth 3 (three) times daily as needed for Dizziness or Nausea. Do not take with zofran 30 tablet 1     meloxicam (MOBIC) 15 MG tablet Take 1 tablet (15 mg total) by mouth once daily. 30 tablet 0    multivitamin capsule Take 1 capsule by mouth once daily.      ondansetron (ZOFRAN) 4 MG tablet Take 1 tablet (4 mg total) by mouth every 6 (six) hours as needed for Nausea. (Patient not taking: Reported on 5/17/2024) 30 tablet 0    sodium,potassium,mag sulfates (SUPREP BOWEL PREP KIT) 17.5-3.13-1.6 gram SolR Take 177 mLs by mouth once daily. 1 each 0       Past Surgical History:   Procedure Laterality Date    COLONOSCOPY N/A 7/30/2019    Procedure: COLONOSCOPY/Suprep;  Surgeon: Emre Carcamo MD;  Location: [facility];  Service: Endoscopy;  Laterality: N/A;    NASAL SEPTUM SURGERY  03/2019    Dr. Aceves    THYROID LOBECTOMY Left 2/6/2014    and isthmusectomy    TONSILLECTOMY         Social History     Socioeconomic History    Marital status: Single   Occupational History     Employer: [employer]   Tobacco Use    Smoking status: Some Days     Types: Cigars    Smokeless tobacco: Never   Substance and Sexual Activity    Alcohol use: Yes     Comment: occ    Drug use: Yes     Frequency: 3.0 times per week     Types: Marijuana    Sexual activity: Not Currently     Partners: Male     Birth control/protection: I.U.D.     Comment: Mirena 1/16/2024   Social History Narrative    Living with her boyfriend. She is from [place]. Her mother and step-father live in [facility]. She is a  and teaches voice in elementary. She attended school at [employer] and graduated from [employer]. 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      Social Determinants of Health     Financial Resource Strain: Medium Risk (11/22/2023)    Overall Financial Resource Strain (CARDIA)     Difficulty of Paying Living Expenses: Somewhat hard   Food Insecurity: No Food Insecurity (11/22/2023)    Hunger Vital Sign     Worried About Running Out of Food in the Last Year: Never true     Ran Out of Food in the Last Year: Never true    Transportation Needs: No Transportation Needs (11/22/2023)    PRAPARE - Transportation     Lack of Transportation (Medical): No     Lack of Transportation (Non-Medical): No   Physical Activity: Insufficiently Active (11/22/2023)    Exercise Vital Sign     Days of Exercise per Week: 1 day     Minutes of Exercise per Session: 10 min   Stress: Stress Concern Present (11/22/2023)    [facility] [employer] [facility] - Occupational Stress Questionnaire     Feeling of Stress : To some extent   Housing Stability: High Risk (11/22/2023)    Housing Stability Vital Sign     Unable to Pay for Housing in the Last Year: Yes     Number of Places Lived in the Last Year: 1     Unstable Housing in the Last Year: No         OBJECTIVE:     Vital Signs Range (Last 24H):       Significant Labs:  Lab Results   Component Value Date    WBC 5.76 10/02/2023    HGB 12.6 10/02/2023    HCT 37.3 10/02/2023     10/02/2023    CHOL 193 11/22/2022    TRIG 91 11/22/2022    HDL 50 11/22/2022    ALT 21 09/06/2023    AST 18 09/06/2023     10/02/2023    K 3.7 10/02/2023     10/02/2023    CREATININE 0.8 10/02/2023    BUN 8 10/02/2023    CO2 22 (L) 10/02/2023    TSH 2.624 09/06/2023    TSH 2.624 09/06/2023    INR 3.5 08/12/2014    HGBA1C 5.3 11/22/2022       Diagnostic Studies:    EKG:   Results for orders placed or performed during the hospital encounter of 12/13/22   EKG 12-lead    Collection Time: 12/13/22  8:45 PM    Narrative    Test Reason : R07.9,    Vent. Rate : 104 BPM     Atrial Rate : 104 BPM     P-R Int : 146 ms          QRS Dur : 072 ms      QT Int : 310 ms       P-R-T Axes : 071 073 041 degrees     QTc Int : 407 ms    Sinus tachycardia  Otherwise normal ECG    Confirmed by Troy Orozco MD ([id number]) on 12/14/2022 8:18:57 PM    Referred By: [email or web address]   SELF           Confirmed By:Troy Orozco MD       Pre-op Assessment    I have reviewed the Patient Summary Reports.     I have reviewed the Nursing Notes. I  have reviewed the NPO Status.   I have reviewed the Medications.     Review of Systems  Anesthesia Hx:  No problems with previous Anesthesia   History of prior surgery of interest to airway management or planning:            Denies Personal Hx of Anesthesia complications.                    Social:  Smoker, Alcohol Use, Recreational Drugs       Hematology/Oncology:                   Hematology Comments: DVT / PE after surgery 2014.  Blood thinner for 10 months       --  Cancer in past history:                 Oncology Comments: Thyroid adenocarcinoma      Cardiovascular:      Denies Hypertension.  Denies Valvular problems/Murmurs.  Denies MI.                                         Pulmonary:        Sleep Apnea (Mandibular advancement device)           Education provided regarding risk of obstructive sleep apnea            Renal/:  Renal/ Normal  Denies Chronic Renal Disease.                Hepatic/GI:  Bowel Prep.   GERD Denies Liver Disease.            Neurological:  Denies TIA.  Denies CVA.   Headaches Denies Seizures.    Dizziness                             Endocrine:  Denies Diabetes. Denies Hypothyroidism.  Denies Hyperthyroidism.       Obesity / BMI > 30         Anesthesia Plan  Type of Anesthesia, risks & benefits discussed:    Anesthesia Type: Gen Natural Airway  Intra-op Monitoring Plan: Standard ASA Monitors  Post Op Pain Control Plan: multimodal analgesia  Induction:  IV  Informed Consent: Informed consent signed with the Patient and all parties understand the risks and agree with anesthesia plan.  All questions answered.   ASA Score: 2  Day of Surgery Review of History & Physical: H&P Update referred to the surgeon/provider.    Ready For Surgery From Anesthesia Perspective.     .

## 2024-05-24 NOTE — PROVATION PATIENT INSTRUCTIONS
Discharge Summary/Instructions after an Endoscopic Procedure  Patient Name: Geri Mosqueda  Patient MRN: [id number]  Patient YOB: 1985  Friday, May 24, 2024  Catalino Raphael MD  Dear patient,  As a result of recent federal legislation (The Federal Cures Act), you may   receive lab or pathology results from your procedure in your [facility]   account before your physician is able to contact you. Your physician or   their representative will relay the results to you with their   recommendations at their soonest availability.  Thank you,  Your health is very important to us during the Covid Crisis. Following your   procedure today, you will receive a daily text for 2 weeks asking about   signs or symptoms of Covid 19.  Please respond to this text when you   receive it so we can follow up and keep you as safe as possible.   RESTRICTIONS:  During your procedure today, you received medications for sedation.  These   medications may affect your judgment, balance and coordination.  Therefore,   for 24 hours, you have the following restrictions:   - DO NOT drive a car, operate machinery, make legal/financial decisions,   sign important papers or drink alcohol.    ACTIVITY:  Today: no heavy lifting, straining or running due to procedural   sedation/anesthesia.  The following day: return to full activity including work.  DIET:  Eat and drink normally unless instructed otherwise.     TREATMENT FOR COMMON SIDE EFFECTS:  - Mild abdominal pain, nausea, belching, bloating or excessive gas:  rest,   eat lightly and use a heating pad.  - Sore Throat: treat with throat lozenges and/or gargle with warm salt   water.  - Because air was used during the procedure, expelling large amounts of air   from your rectum or belching is normal.  - If a bowel prep was taken, you may not have a bowel movement for 1-3 days.    This is normal.  SYMPTOMS TO WATCH FOR AND REPORT TO YOUR PHYSICIAN:  1. Abdominal pain or bloating, other than  gas cramps.  2. Chest pain.  3. Back pain.  4. Signs of infection such as: chills or fever occurring within 24 hours   after the procedure.  5. Rectal bleeding, which would show as bright red, maroon, or black stools.   (A tablespoon of blood from the rectum is not serious, especially if   hemorrhoids are present.)  6. Vomiting.  7. Weakness or dizziness.  GO DIRECTLY TO THE NEAREST EMERGENCY ROOM IF YOU HAVE ANY OF THE FOLLOWING:      Difficulty breathing              Chills and/or fever over 101 F   Persistent vomiting and/or vomiting blood   Severe abdominal pain   Severe chest pain   Black, tarry stools   Bleeding- more than one tablespoon   Any other symptom or condition that you feel may need urgent attention  Your doctor recommends these additional instructions:  If any biopsies were taken, your doctors clinic will contact you in 1 to 2   weeks with any results.  - Discharge patient to home.   - Patient has a contact number available for emergencies.  The signs and   symptoms of potential delayed complications were discussed with the   patient.  Return to normal activities tomorrow.  Written discharge   instructions were provided to the patient.   - Resume previous diet.   - Continue present medications.   - Await pathology results.   - Perform a colonoscopy today.   - Proceed with treatment for Hpylori gastritis.  For questions, problems or results please call your physician - Catalino Raphael MD.  EMERGENCY PHONE NUMBER: [phone],  LAB RESULTS: [phone]  IF A COMPLICATION OR EMERGENCY SITUATION ARISES AND YOU ARE UNABLE TO REACH   YOUR PHYSICIAN - GO DIRECTLY TO THE EMERGENCY ROOM.  Catalino Raphael MD  5/24/2024 12:10:56 PM  This report has been verified and signed electronically.  Dear patient,  As a result of recent federal legislation (The Federal Cures Act), you may   receive lab or pathology results from your procedure in your [facility]   account before your physician is able to contact  you. Your physician or   their representative will relay the results to you with their   recommendations at their soonest availability.  Thank you,  PROVATION

## 2024-05-24 NOTE — ANESTHESIA POSTPROCEDURE EVALUATION
Anesthesia Post Evaluation    Patient: Geri Mosqueda    Procedure(s) Performed: Procedure(s) (LRB):  COLONOSCOPY (N/A)  EGD (ESOPHAGOGASTRODUODENOSCOPY) (N/A)    Final Anesthesia Type: general      Patient location during evaluation: PACU  Patient participation: Yes- Able to Participate  Level of consciousness: awake  Post-procedure vital signs: reviewed and stable  Pain management: adequate  Airway patency: patent    PONV status at discharge: No PONV  Anesthetic complications: no      Cardiovascular status: blood pressure returned to baseline  Respiratory status: unassisted  Hydration status: euvolemic  Follow-up not needed.              Vitals Value Taken Time   /78 05/24/24 1310   Temp 36.7 °C (98.1 °F) 05/24/24 1241   Pulse 76 05/24/24 1310   Resp 18 05/24/24 1310   SpO2 99 % 05/24/24 1310         Event Time   Out of Recovery 13:24:39         Pain/Marshall Score: Marshall Score: 10 (5/24/2024  1:10 PM)

## 2024-05-27 LAB
FINAL PATHOLOGIC DIAGNOSIS: NORMAL
GROSS: NORMAL
Lab: NORMAL

## 2024-05-28 ENCOUNTER — TELEPHONE (OUTPATIENT)
Dept: GASTROENTEROLOGY | Facility: CLINIC | Age: 39
End: 2024-05-28
Payer: COMMERCIAL

## 2024-05-28 NOTE — TELEPHONE ENCOUNTER
Spoke with patient and scheduled f/u 07/17. V/U    ----- Message from Almita Bryan NP sent at 5/28/2024  7:53 AM CDT -----  Yes, can see her back.     [place], can we get her scheduled for Hpylori f/u?    Thanks!  ----- Message -----  From: Catalino Raphael MD  Sent: 5/27/2024  10:10 PM CDT  To: DOMINGO Gannon, HP was positive , I told her after scope to start the treatment.  Can you see her back for HP follow up    Sparse rectosigmoid inflammation, mild and uncler etiology, the bipsies show mild chronic inflammation, let me know if her bowel habits get worse, possible nsaid related? , could consider sometihng like mesalamine in future if bowel habits worsening, however this was very limited and mild segment

## 2024-06-20 ENCOUNTER — DOCUMENTATION ONLY (OUTPATIENT)
Dept: REHABILITATION | Facility: HOSPITAL | Age: 39
End: 2024-06-20
Payer: COMMERCIAL

## 2024-06-20 DIAGNOSIS — R42 DIZZINESS: Primary | ICD-10-CM

## 2024-06-20 NOTE — PROGRESS NOTES
OUTPATIENT PHYSICAL THERAPY DISCHARGE SUMMARY     Name: Geri Garcia [facility] Number: [id number]    Diagnosis:   Encounter Diagnosis   Name Primary?    Dizziness Yes     Physician: Monae Chapin MD   Treatment Orders: PT Eval and Treat  Past Medical History:   Diagnosis Date    Allergy     seasonal    Blood clotting tendency     DVT (deep venous thrombosis) 02/11/2014    after surgery    Follicular adenoma of thyroid gland 02/06/2014    Keloid cicatrix     PCOS (polycystic ovarian syndrome)     PE (pulmonary embolism) 02/11/2014    after surgery       Initial visit: 05/02/24  Date of Last visit: 05/17/24  Date of Discharge Note:  06/20/24  Total Visits Received: 2  Missed Visits: patient cancelled all remaining scheduled follow up visits    ASSESSMENT   Status Towards Goals Met:   Please refer to last follow up note on 05/17/24 for most updated functional status. Patient cancelled remaining scheduled follow up appointments and has not scheduled further follow up appointments.     Discharge reason : Pt has not re-scheduled further follow-up sessions    PLAN   This patient is discharged from Outpatient Physical Therapy Services.     Maria Luz Rios, PT  06/20/2024

## 2024-07-17 ENCOUNTER — TELEPHONE (OUTPATIENT)
Dept: GASTROENTEROLOGY | Facility: CLINIC | Age: 39
End: 2024-07-17

## 2024-07-17 NOTE — TELEPHONE ENCOUNTER
Left VM and call back number    ----- Message from Dariela Bazzi sent at 7/17/2024  8:43 AM CDT -----  .Type:  Needs Medical Advice    Who Called: pt    Would the patient rather a call back or a response via [facility]? Call back  Best Call Back Number:[phone]  Additional Information:     Pt stated she would like a call back to reschedule her appt for today because she doesn't have a ride

## 2024-07-22 ENCOUNTER — TELEPHONE (OUTPATIENT)
Dept: GASTROENTEROLOGY | Facility: CLINIC | Age: 39
End: 2024-07-22
Payer: MEDICAID

## 2024-08-01 ENCOUNTER — E-VISIT (OUTPATIENT)
Dept: PRIMARY CARE CLINIC | Facility: CLINIC | Age: 39
End: 2024-08-01
Payer: MEDICAID

## 2024-08-01 DIAGNOSIS — Z79.899 MEDICAL MARIJUANA USE: Primary | ICD-10-CM

## 2024-08-01 NOTE — PROGRESS NOTES
Patient ID: Geri Mosqueda is a 39 y.o. female.    Chief Complaint: No chief complaint on file.    The patient initiated a request through [employer] on 8/1/2024 for evaluation and management with a chief complaint of No chief complaint on file.     I evaluated the questionnaire submission on 8/1/24.    [facility]    8/1/2024 11:59 AM CDT - Filed by Patient   Do you agree to participate in an E-Visit? Yes   If you have any of the following symptoms, please present to your local emergency room or call 911:  I acknowledge   Are you pregnant, could you be pregnant, or are you breast feeding? None of the above   What is the main issue you would like addressed today? Information request for obtaining medical Marijuana card   Please describe your symptoms Chronic pain from scoliosis, fibromyalgia, arthritis, and anxiety/depression   Where is your problem located? Hands, hips, back, legs   How severe are your symptoms? Severe   Have you had these symptoms before? Yes   How long have you been having these symptoms? For more than a month   Please list any medications or treatments you have used for your condition and indicate if it was effective or not. Had been prescribed pain medication, but was not conducive to working hours, as it made me incredibly sleepy   What makes this feel better? Chiropractic adjustment and massage   What makes this feel worse? I'm always in pain, but when symptoms flare I feel worse   Are these symptoms related to a condition that you currently have? Yes   What is the condition? Fibromyaligia, scoliosis, arthritis   When were you last seen for this condition?    Please describe any probable cause for these symptoms Genetics, environmental and work stress   Provide any additional information you feel is important.    Please attach any relevant images or files    Are you able to take your vital signs? No         No diagnosis found.     No orders of the defined types were placed  in this encounter.           No follow-ups on file.      E-Visit Time Trackin minutes

## 2024-08-11 DIAGNOSIS — R11.0 NAUSEA: ICD-10-CM

## 2024-08-11 DIAGNOSIS — R42 DIZZINESS: ICD-10-CM

## 2024-08-11 NOTE — TELEPHONE ENCOUNTER
Care Due:                  Date            Visit Type   Department     Provider  --------------------------------------------------------------------------------                                ESTABLISHED                              PATIENT -    [facility]  Last Visit: 05-      VIRTUAL      CARE           Monae Chapin  Next Visit: None Scheduled  None         None Found                                                            Last  Test          Frequency    Reason                     Performed    Due Date  --------------------------------------------------------------------------------    Cr..........  12 months..  DULoxetine...............  10-   09-    [facility] Embedded Care Due Messages. Reference number: [id number].   8/11/2024 3:15:47 PM CDT

## 2024-08-12 RX ORDER — MECLIZINE HYDROCHLORIDE 25 MG/1
TABLET ORAL
Qty: 30 TABLET | Refills: 1 | Status: SHIPPED | OUTPATIENT
Start: 2024-08-12

## 2024-10-14 ENCOUNTER — PATIENT MESSAGE (OUTPATIENT)
Dept: GASTROENTEROLOGY | Facility: CLINIC | Age: 39
End: 2024-10-14
Payer: MEDICAID

## 2024-10-23 ENCOUNTER — OFFICE VISIT (OUTPATIENT)
Dept: GASTROENTEROLOGY | Facility: CLINIC | Age: 39
End: 2024-10-23
Payer: MEDICAID

## 2024-10-23 VITALS — WEIGHT: 261.94 LBS | BODY MASS INDEX: 36.53 KG/M2

## 2024-10-23 DIAGNOSIS — A04.8 H. PYLORI INFECTION: Primary | ICD-10-CM

## 2024-10-23 DIAGNOSIS — R19.8 ABNORMAL BOWEL MOVEMENT: ICD-10-CM

## 2024-10-23 PROCEDURE — 3008F BODY MASS INDEX DOCD: CPT | Mod: CPTII,,,

## 2024-10-23 PROCEDURE — 99213 OFFICE O/P EST LOW 20 MIN: CPT | Mod: PBBFAC,PN

## 2024-10-23 PROCEDURE — 99999 PR PBB SHADOW E&M-EST. PATIENT-LVL III: CPT | Mod: PBBFAC,,,

## 2024-10-23 PROCEDURE — 1159F MED LIST DOCD IN RCRD: CPT | Mod: CPTII,,,

## 2024-10-23 PROCEDURE — 99214 OFFICE O/P EST MOD 30 MIN: CPT | Mod: S$PBB,,,

## 2024-10-23 RX ORDER — SUCRALFATE 1 G/1
1 TABLET ORAL 4 TIMES DAILY PRN
Qty: 90 TABLET | Refills: 1 | Status: SHIPPED | OUTPATIENT
Start: 2024-10-23 | End: 2024-11-22

## 2024-10-23 NOTE — PROGRESS NOTES
GASTROENTEROLOGY CLINIC NOTE    Reason for visit: The primary encounter diagnosis was H. pylori infection. A diagnosis of Abnormal bowel movement was also pertinent to this visit.  Referring provider/PCP: Monae Chapin MD    HPI:  Geri Mosqueda is a 39 y.o. female here today for f/u, she is accompanied by her mom. (+) Hp sero after last visit- tx with quad therapy. Also underwent EGD/colon-- EGD confirmed Hpylori gastritis, colon with polyps removed and mild inflammation noted. She reports improvement after Hplyori tx for awhile, now nausea returned. Also accompanied with abd bloating and belching. She reports having mostly normal BM's, some intermittent diarrhea, no further constipation. Also with intermittent abd cramping.     Initial Visit in May:  Geri Mosqueda is a 38 y.o. female here today for multiple GI symptoms, she is accompanied by her mom. She reports last September developing nausea, came on gradually and then worsened. Associated vomiting as well. She reports vomiting was severe, multiple times/day, vomiting food and dry heaving. She was started on pantoprazole and anti-nausea medications. She reports no longer vomiting, does have nausea still. Nausea occurs daily, random, not always associated with eating. She denies abd pain or reflux symptoms, no decrease in appetite or early satiety. She does report significant NSAID use months ago d/t chronic HA's. She also reports change in BM's, has never had regular BM's but now alternates between constipation and diarrhea. She is seeing PT for nausea and dizziness. FH of celiac and gastroparesis in mother, CRC in grandmother.     Prior Endoscopy: 2024 with Dr. Raphael:  EGD:  - Normal esophagus.               - Gastritis. Biopsied.               - Normal examined duodenum. Biopsied.   Colon:   - Three 4 to 5 mm polyps in the sigmoid colon and in the transverse colon, removed with a cold snare. Resected and retrieved.                 - Lipomatous ileocecal valve.                - Erythematous and nodular mucosa in the recto-sigmoid colon. Biopsied.                - The examination was otherwise normal on direct and retroflexion views.    - Repeat colonoscopy in 5 years for surveillance.     Final Pathologic Diagnosis 1. DUODENUM, BIOPSY:  Duodenal mucosa with no significant pathologic abnormality  Villous architecture is maintained    2. STOMACH, BIOPSY:  Helicobacter pylori associated chronic active gastritis and reactive changes suggestive of proton pump inhibitor therapy  No evidence of intestinal metaplasia, dysplasia or malignancy  Few Helicobacter pylori organisms identified on H&E stain    3. TRANSVERSE COLON, POLYPECTOMY:  Tubular adenoma  A separate fragment of benign colonic mucosa with lymphoid aggregate    4. SIGMOID COLON, POLYPECTOMY:  Food particles only  No tissue identified    5. RECTOSIGMOID COLON, BIOPSY:  Benign colonic mucosa with non-specific chronic inflammatory changes  No evidence of active colitis, granuloma, dysplasia or malignancy       (Portions of this note were dictated using voice recognition software and may contain dictation related errors in spelling/grammar/syntax not found on text review)    Review of Systems   Gastrointestinal:  Positive for abdominal pain (cramping), diarrhea (intermittent) and nausea. Negative for heartburn and vomiting.       Past Medical History: has a past medical history of Allergy, Blood clotting tendency, DVT (deep venous thrombosis), Follicular adenoma of thyroid gland, Keloid cicatrix, PCOS (polycystic ovarian syndrome), and PE (pulmonary embolism).    Past Surgical History: has a past surgical history that includes Tonsillectomy; Thyroid lobectomy (Left, 02/06/2014); Colonoscopy (N/A, 07/30/2019); Nasal septum surgery (03/2019); Ankle surgery (Right); Colonoscopy (N/A, 5/24/2024); and Esophagogastroduodenoscopy (N/A, 5/24/2024).    Home medications:   Current Outpatient  Medications on File Prior to Visit   Medication Sig Dispense Refill    busPIRone (BUSPAR) 7.5 MG tablet TAKE 1 TABLET(7.5 MG) BY MOUTH TWICE DAILY 180 tablet 3    DULoxetine (CYMBALTA) 60 MG capsule TAKE 1 CAPSULE(60 MG) BY MOUTH EVERY DAY 90 capsule 3    meclizine (ANTIVERT) 25 mg tablet TAKE 1 TABLET(25 MG) BY MOUTH THREE TIMES DAILY AS NEEDED FOR DIZZINESS OR NAUSEA. DO NOT TAKE WITH ZOFRAN 30 tablet 1    pantoprazole (PROTONIX) 40 MG tablet TAKE 1 TABLET(40 MG) BY MOUTH DAILY 90 tablet 3    meloxicam (MOBIC) 15 MG tablet Take 1 tablet (15 mg total) by mouth once daily. (Patient not taking: Reported on 10/23/2024) 30 tablet 0     Current Facility-Administered Medications on File Prior to Visit   Medication Dose Route Frequency Provider Last Rate Last Admin    levonorgestreL (MIRENA) 21 mcg/24 hours (8 yrs) 52 mg IUD 1 Intra Uterine Device  1 Intra Uterine Device Intrauterine  Augusta, Dyan AGUILAR MD   1 Intra Uterine Device at 01/16/24 1615       Vital signs:  Wt 118.8 kg (261 lb 14.5 oz)   BMI 36.53 kg/m²     Physical Exam  Constitutional:       Appearance: Normal appearance.   Abdominal:      General: There is no distension.   Neurological:      Mental Status: She is alert.       I have reviewed associated labs, imaging and notes.     Assessment:  1. H. pylori infection    2. Abnormal bowel movement    EGD- gastritis, HP (+)   Tx with quad therapy- symptoms improved for awhile, now returned  Daily nausea, bloating, belching  BM's improving- normal, intermittent diarrhea, no further constipation   Intermittent abd cramping.     Check stool sample to confirm hpylori eradication. Also check calprotectin d/t previous inflammation seen during colonoscopy. If calproctectin negative, consider bentyl. If hpylori negative- consider GES.     Plan:  Orders Placed This Encounter    Calprotectin, Stool    H. pylori antigen, stool    sucralfate (CARAFATE) 1 gram tablet     Submit stool sample   Hold pantoprazole for 2 weeks  before submitting-- take carafate instead    [facility] - [place]

## 2024-11-07 ENCOUNTER — LAB VISIT (OUTPATIENT)
Dept: LAB | Facility: HOSPITAL | Age: 39
End: 2024-11-07
Payer: MEDICAID

## 2024-11-07 DIAGNOSIS — R19.8 ABNORMAL BOWEL MOVEMENT: ICD-10-CM

## 2024-11-07 DIAGNOSIS — A04.8 H. PYLORI INFECTION: ICD-10-CM

## 2024-11-07 PROCEDURE — 87338 HPYLORI STOOL AG IA: CPT

## 2024-11-07 PROCEDURE — 83993 ASSAY FOR CALPROTECTIN FECAL: CPT

## 2024-11-11 LAB — CALPROTECTIN STL-MCNT: 221 MCG/G

## 2024-11-26 DIAGNOSIS — R11.0 INTRACTABLE NAUSEA: Primary | ICD-10-CM

## 2024-12-12 ENCOUNTER — PATIENT MESSAGE (OUTPATIENT)
Dept: GASTROENTEROLOGY | Facility: CLINIC | Age: 39
End: 2024-12-12
Payer: MEDICAID

## 2025-01-06 ENCOUNTER — PATIENT MESSAGE (OUTPATIENT)
Dept: PRIMARY CARE CLINIC | Facility: CLINIC | Age: 40
End: 2025-01-06
Payer: MEDICAID

## 2025-01-06 DIAGNOSIS — R42 DIZZINESS: ICD-10-CM

## 2025-01-06 DIAGNOSIS — R11.0 NAUSEA: ICD-10-CM

## 2025-01-07 RX ORDER — MECLIZINE HYDROCHLORIDE 25 MG/1
25 TABLET ORAL DAILY PRN
Qty: 30 TABLET | Refills: 0 | Status: SHIPPED | OUTPATIENT
Start: 2025-01-07

## 2025-01-18 ENCOUNTER — E-VISIT (OUTPATIENT)
Dept: PRIMARY CARE CLINIC | Facility: CLINIC | Age: 40
End: 2025-01-18
Payer: MEDICAID

## 2025-01-18 DIAGNOSIS — J45.20 MILD INTERMITTENT ASTHMA WITHOUT COMPLICATION: Primary | ICD-10-CM

## 2025-01-22 ENCOUNTER — TELEPHONE (OUTPATIENT)
Dept: GASTROENTEROLOGY | Facility: CLINIC | Age: 40
End: 2025-01-22
Payer: MEDICAID

## 2025-01-22 PROCEDURE — 99421 OL DIG E/M SVC 5-10 MIN: CPT | Mod: ,,, | Performed by: STUDENT IN AN ORGANIZED HEALTH CARE EDUCATION/TRAINING PROGRAM

## 2025-01-22 RX ORDER — ALBUTEROL SULFATE 90 UG/1
2 INHALANT RESPIRATORY (INHALATION) EVERY 6 HOURS PRN
Qty: 18 G | Refills: 11 | Status: SHIPPED | OUTPATIENT
Start: 2025-01-22

## 2025-01-22 NOTE — PROGRESS NOTES
Patient ID: Geri Mosqueda is a 39 y.o. female.    Chief Complaint: General Illness (Entered automatically based on patient selection in [employer].)    The patient initiated a request through [employer] on 1/18/2025 for evaluation and management with a chief complaint of General Illness (Entered automatically based on patient selection in [employer].)     I evaluated the questionnaire submission on 1/22/25.    [facility] Evisit [facility]-Chronic Conditions    1/18/2025  4:48 PM CST - Filed by Patient   What do you need help with? Medication Request   Do you agree to participate in an E-Visit? Yes   If you have any of the following symptoms, please present to your local emergency room or call 911:  I acknowledge   Medication requests for narcotics will not be addressed via an E-Visit.  Please schedule an appointment. I acknowledge   Do you have any of the following pregnancy-related conditions? None   Do you want to address a new or existing medication? I would like to address a medication I currently take   What is the main issue you would like addressed today? Breathing concerns   Would you like to change or continue your medication? Continue medication   What medication would you like to continue?  albuterol (PROVENTIL/VENTOLIN HFA) 90 mcg/actuation inhaler   Are you taking it as prescribed? No   What is your current dose? 90 mcg   How often do you take your medication? 2 times daily   Which option below best descibes the reason for your request? I am out of refills and need more    What medical condition is the  medication intended to treat? Asthma   Is the medication helping your condition? Yes   Are you having any side effects from the medication? No   Provide any additional information you feel is important. Was prescribed inhaler after having COVID twice. I lost the inahler and need a new rx, please. I've experienced dizziness and lack consistent, full breaths. No asthma attack or shortness of breath but, it  especially occur while singing.   Please attach any relevant images or files    Are you able to take your vital signs? No         No diagnosis found.     No orders of the defined types were placed in this encounter.     Medications Ordered This Encounter   Medications    albuterol (VENTOLIN HFA) 90 mcg/actuation inhaler     Sig: Inhale 2 puffs into the lungs every 6 (six) hours as needed for Wheezing or Shortness of Breath. Rescue     Dispense:  18 g     Refill:  11        No follow-ups on file.      E-Visit Time Trackin minutes

## 2025-01-23 ENCOUNTER — OFFICE VISIT (OUTPATIENT)
Dept: GASTROENTEROLOGY | Facility: CLINIC | Age: 40
End: 2025-01-23
Payer: MEDICAID

## 2025-01-23 DIAGNOSIS — K52.9 COLITIS WITHOUT COMPLICATION: ICD-10-CM

## 2025-01-23 DIAGNOSIS — K29.50 CHRONIC HELICOBACTER PYLORI GASTRITIS: ICD-10-CM

## 2025-01-23 DIAGNOSIS — R19.8 ALTERNATING CONSTIPATION AND DIARRHEA: ICD-10-CM

## 2025-01-23 DIAGNOSIS — B96.81 CHRONIC HELICOBACTER PYLORI GASTRITIS: ICD-10-CM

## 2025-01-23 DIAGNOSIS — R11.0 INTRACTABLE NAUSEA: Primary | ICD-10-CM

## 2025-01-23 NOTE — Clinical Note
Hello, I am seeing her for chronic nausea. I am working her up for GI causes, and getting CT scan and emptying scan... Will continue to evaluate this, but it is odd to me that she has so much dizzyness and her nausea is almost completely resolved with taking meclizine, which is not a typical feature of GI nausea... Perhaps you can work with her for further eval of the dizzyness assoc nausea?  Thanks and let me know if you have other thoughts Lucinda

## 2025-01-23 NOTE — Clinical Note
Please arrange ct with contrast , needs labs prior and preg test. Please also arrange , separate day, for NM emtpying scan  Then follow up with me, 6 or so weeks is fine.

## 2025-01-23 NOTE — PROGRESS NOTES
The patient location is: home  The chief complaint leading to consultation is: follow up     Visit type: audiovisual    Face to Face time with patient: 22min  32 minutes of total time spent on the encounter, which includes face to face time and non-face to face time preparing to see the patient (eg, review of tests), Obtaining and/or reviewing separately obtained history, Documenting clinical information in the electronic or other health record, Independently interpreting results (not separately reported) and communicating results to the patient/family/caregiver, or Care coordination (not separately reported).         Each patient to whom he or she provides medical services by telemedicine is:  (1) informed of the relationship between the physician and patient and the respective role of any other health care provider with respect to management of the patient; and (2) notified that he or she may decline to receive medical services by telemedicine and may withdraw from such care at any time.         GASTROENTEROLOGY CLINIC NOTE    Reason for visit: The primary encounter diagnosis was Intractable nausea. Diagnoses of Alternating constipation and diarrhea, Chronic Helicobacter pylori gastritis, and Colitis without complication were also pertinent to this visit.  Referring provider/PCP: Monae Chapin MD    HPI:  Geri Mosqueda is a 39 y.o. female follow up    Interval  Longstanding nausea , stomach issues, ongoing for > 1 year , nearly every day. Intermittent dry heaves.  Feels like needs to vomit every 3-4 days. Has the sensation to vomit but wont actually vomit most of times, has strong gag reflex. Gets dizzy associated with nausea at times. Nausea not related to eating. Rx meclizine for dizzy and nausea which does help.   Hx of cluster headaches / migraines , recently as well.   Longstanding constipation, although since the procedures 6 months ago, will go have BM every 2 days.  Has not noticed a  relationship to nausea and better bowel habits.    Zofran doesn't help with nausea  Meclizine only thing that works with nausea (and dizzy)    Taking protonix and carafate prn    HP stool was negative, she had stopped the protonix for this test.    Calpro elevated 200s        Initial Visit in May:  Geri Mosqueda is a 38 y.o. female here today for multiple GI symptoms, she is accompanied by her mom. She reports last September developing nausea, came on gradually and then worsened. Associated vomiting as well. She reports vomiting was severe, multiple times/day, vomiting food and dry heaving. She was started on pantoprazole and anti-nausea medications. She reports no longer vomiting, does have nausea still. Nausea occurs daily, random, not always associated with eating. She denies abd pain or reflux symptoms, no decrease in appetite or early satiety. She does report significant NSAID use months ago d/t chronic HA's. She also reports change in BM's, has never had regular BM's but now alternates between constipation and diarrhea. She is seeing PT for nausea and dizziness. FH of celiac and gastroparesis in mother, CRC in grandmother.     Prior Endoscopy: 2024 with Dr. Raphael:  EGD:  - Normal esophagus.               - Gastritis. Biopsied.               - Normal examined duodenum. Biopsied.   Colon:   - Three 4 to 5 mm polyps in the sigmoid colon and in the transverse colon, removed with a cold snare. Resected and retrieved.                - Lipomatous ileocecal valve.                - Erythematous and nodular mucosa in the recto-sigmoid colon. Biopsied.                - The examination was otherwise normal on direct and retroflexion views.    - Repeat colonoscopy in 5 years for surveillance.     Final Pathologic Diagnosis 1. DUODENUM, BIOPSY:  Duodenal mucosa with no significant pathologic abnormality  Villous architecture is maintained    2. STOMACH, BIOPSY:  Helicobacter pylori associated chronic active  gastritis and reactive changes suggestive of proton pump inhibitor therapy  No evidence of intestinal metaplasia, dysplasia or malignancy  Few Helicobacter pylori organisms identified on H&E stain    3. TRANSVERSE COLON, POLYPECTOMY:  Tubular adenoma  A separate fragment of benign colonic mucosa with lymphoid aggregate    4. SIGMOID COLON, POLYPECTOMY:  Food particles only  No tissue identified    5. RECTOSIGMOID COLON, BIOPSY:  Benign colonic mucosa with non-specific chronic inflammatory changes  No evidence of active colitis, granuloma, dysplasia or malignancy       (Portions of this note were dictated using voice recognition software and may contain dictation related errors in spelling/grammar/syntax not found on text review)    Review of Systems   Constitutional:  Negative for fever and malaise/fatigue.   Respiratory:  Negative for cough and shortness of breath.    Cardiovascular:  Negative for chest pain and palpitations.   Gastrointestinal:  Positive for abdominal pain, nausea and vomiting. Negative for blood in stool.   Neurological:  Positive for dizziness and headaches.       Past Medical History: has a past medical history of Allergy, Blood clotting tendency, DVT (deep venous thrombosis), Follicular adenoma of thyroid gland, Keloid cicatrix, PCOS (polycystic ovarian syndrome), and PE (pulmonary embolism).    Past Surgical History: has a past surgical history that includes Tonsillectomy; Thyroid lobectomy (Left, 02/06/2014); Colonoscopy (N/A, 07/30/2019); Nasal septum surgery (03/2019); Ankle surgery (Right); Colonoscopy (N/A, 5/24/2024); and Esophagogastroduodenoscopy (N/A, 5/24/2024).    Home medications:   Current Outpatient Medications on File Prior to Visit   Medication Sig Dispense Refill    albuterol (VENTOLIN HFA) 90 mcg/actuation inhaler Inhale 2 puffs into the lungs every 6 (six) hours as needed for Wheezing or Shortness of Breath. Rescue 18 g 11    busPIRone (BUSPAR) 7.5 MG tablet TAKE 1  TABLET(7.5 MG) BY MOUTH TWICE DAILY 180 tablet 3    DULoxetine (CYMBALTA) 60 MG capsule TAKE 1 CAPSULE(60 MG) BY MOUTH EVERY DAY 90 capsule 3    meclizine (ANTIVERT) 25 mg tablet Take 1 tablet (25 mg total) by mouth daily as needed for Dizziness. 30 tablet 0    meloxicam (MOBIC) 15 MG tablet Take 1 tablet (15 mg total) by mouth once daily. (Patient not taking: Reported on 10/23/2024) 30 tablet 0    pantoprazole (PROTONIX) 40 MG tablet TAKE 1 TABLET(40 MG) BY MOUTH DAILY 90 tablet 3     Current Facility-Administered Medications on File Prior to Visit   Medication Dose Route Frequency Provider Last Rate Last Admin    levonorgestreL (MIRENA) 21 mcg/24 hours (8 yrs) 52 mg IUD 1 Intra Uterine Device  1 Intra Uterine Device Intrauterine  Twain, Dyan AGUILAR MD   1 Intra Uterine Device at 01/16/24 1615       Vital signs:  There were no vitals taken for this visit.    Physical Exam  Vitals reviewed.   Constitutional:       Appearance: She is not toxic-appearing.   Neurological:      Mental Status: She is alert.       I have reviewed associated labs, imaging and notes.     Assessment:  1. Intractable nausea    2. Alternating constipation and diarrhea    3. Chronic Helicobacter pylori gastritis    4. Colitis without complication        HP gastritis - treated with quad therapy, stool testing negative    Mild sigmoid colitis of unclear etiology , possibly NSAID?  Calpro 200s , bowels are now actually improved (stopped nsaids recently before calpro test was done)  No symptoms of diarrhea, etc.    Nausea with assoc dizzyness, also hx of cluster/ migraines ?   Unclear but improved with meclizine (the only thing that helps the nausea) and thus perhaps favor a non-GI etiology of nausea / dizzy      Plan:  Orders Placed This Encounter    CT Enterography Abd_Pelvis With Contrast    Comprehensive Metabolic Panel    CBC Auto Differential    C-Reactive Protein    TSH    Pregnancy, urine rapid       Continue protonix    CT enterography      NM emptying scan    Continue meclizine    Will reach out to PCP for further eval dizzyness    Consider adding mesalamine etc based on CT findings.    RTC 6 weeks     Catalino Raphael MD  [facility]

## 2025-01-24 ENCOUNTER — PATIENT MESSAGE (OUTPATIENT)
Dept: PRIMARY CARE CLINIC | Facility: CLINIC | Age: 40
End: 2025-01-24
Payer: MEDICAID

## 2025-01-27 ENCOUNTER — TELEPHONE (OUTPATIENT)
Dept: GASTROENTEROLOGY | Facility: CLINIC | Age: 40
End: 2025-01-27
Payer: MEDICAID

## 2025-01-27 NOTE — TELEPHONE ENCOUNTER
Spoke wit patient and scheduled CT, labs, and gastric emptying. Patient added to wait list for follow up appt. V/U    ----- Message from Catalino Raphael MD sent at 1/23/2025  2:00 PM CST -----  Please arrange ct with contrast , needs labs prior and preg test.  Please also arrange , separate day, for NM emtpying scan    Then follow up with me, 6 or so weeks is fine.

## 2025-01-30 ENCOUNTER — LAB VISIT (OUTPATIENT)
Dept: LAB | Facility: HOSPITAL | Age: 40
End: 2025-01-30
Attending: INTERNAL MEDICINE
Payer: MEDICAID

## 2025-01-30 DIAGNOSIS — R11.0 INTRACTABLE NAUSEA: ICD-10-CM

## 2025-01-30 LAB
ALBUMIN SERPL BCP-MCNC: 3.9 G/DL (ref 3.5–5.2)
ALP SERPL-CCNC: 73 U/L (ref 40–150)
ALT SERPL W/O P-5'-P-CCNC: 16 U/L (ref 10–44)
ANION GAP SERPL CALC-SCNC: 9 MMOL/L (ref 8–16)
AST SERPL-CCNC: 15 U/L (ref 10–40)
BASOPHILS # BLD AUTO: 0.05 K/UL (ref 0–0.2)
BASOPHILS NFR BLD: 0.9 % (ref 0–1.9)
BILIRUB SERPL-MCNC: 0.6 MG/DL (ref 0.1–1)
BUN SERPL-MCNC: 10 MG/DL (ref 6–20)
CALCIUM SERPL-MCNC: 8.8 MG/DL (ref 8.7–10.5)
CHLORIDE SERPL-SCNC: 108 MMOL/L (ref 95–110)
CO2 SERPL-SCNC: 23 MMOL/L (ref 23–29)
CREAT SERPL-MCNC: 0.8 MG/DL (ref 0.5–1.4)
CRP SERPL-MCNC: 14.6 MG/L (ref 0–8.2)
DIFFERENTIAL METHOD BLD: ABNORMAL
EOSINOPHIL # BLD AUTO: 0.2 K/UL (ref 0–0.5)
EOSINOPHIL NFR BLD: 3 % (ref 0–8)
ERYTHROCYTE [DISTWIDTH] IN BLOOD BY AUTOMATED COUNT: 15.6 % (ref 11.5–14.5)
EST. GFR  (NO RACE VARIABLE): >60 ML/MIN/1.73 M^2
GLUCOSE SERPL-MCNC: 93 MG/DL (ref 70–110)
HCT VFR BLD AUTO: 38.3 % (ref 37–48.5)
HGB BLD-MCNC: 12 G/DL (ref 12–16)
IMM GRANULOCYTES # BLD AUTO: 0.02 K/UL (ref 0–0.04)
IMM GRANULOCYTES NFR BLD AUTO: 0.3 % (ref 0–0.5)
LYMPHOCYTES # BLD AUTO: 2.2 K/UL (ref 1–4.8)
LYMPHOCYTES NFR BLD: 39 % (ref 18–48)
MCH RBC QN AUTO: 23.6 PG (ref 27–31)
MCHC RBC AUTO-ENTMCNC: 31.3 G/DL (ref 32–36)
MCV RBC AUTO: 75 FL (ref 82–98)
MONOCYTES # BLD AUTO: 0.5 K/UL (ref 0.3–1)
MONOCYTES NFR BLD: 9.1 % (ref 4–15)
NEUTROPHILS # BLD AUTO: 2.7 K/UL (ref 1.8–7.7)
NEUTROPHILS NFR BLD: 47.7 % (ref 38–73)
NRBC BLD-RTO: 0 /100 WBC
PLATELET # BLD AUTO: 317 K/UL (ref 150–450)
PMV BLD AUTO: 9.3 FL (ref 9.2–12.9)
POTASSIUM SERPL-SCNC: 4 MMOL/L (ref 3.5–5.1)
PROT SERPL-MCNC: 6.9 G/DL (ref 6–8.4)
RBC # BLD AUTO: 5.09 M/UL (ref 4–5.4)
SODIUM SERPL-SCNC: 140 MMOL/L (ref 136–145)
TSH SERPL DL<=0.005 MIU/L-ACNC: 2.31 UIU/ML (ref 0.4–4)
WBC # BLD AUTO: 5.72 K/UL (ref 3.9–12.7)

## 2025-01-30 PROCEDURE — 84443 ASSAY THYROID STIM HORMONE: CPT | Performed by: INTERNAL MEDICINE

## 2025-01-30 PROCEDURE — 86140 C-REACTIVE PROTEIN: CPT | Performed by: INTERNAL MEDICINE

## 2025-01-30 PROCEDURE — 85025 COMPLETE CBC W/AUTO DIFF WBC: CPT | Performed by: INTERNAL MEDICINE

## 2025-01-30 PROCEDURE — 80053 COMPREHEN METABOLIC PANEL: CPT | Performed by: INTERNAL MEDICINE

## 2025-01-30 PROCEDURE — 36415 COLL VENOUS BLD VENIPUNCTURE: CPT | Performed by: INTERNAL MEDICINE

## 2025-02-02 DIAGNOSIS — R42 DIZZINESS: ICD-10-CM

## 2025-02-02 DIAGNOSIS — R11.0 NAUSEA: ICD-10-CM

## 2025-02-02 NOTE — TELEPHONE ENCOUNTER
Care Due:                  Date            Visit Type   Department     Provider  --------------------------------------------------------------------------------                                ESTABLISHED                              PATIENT -    [facility]  Last Visit: 05-      [facility]      CARE           Monae Chapin  Next Visit: None Scheduled  None         None Found                                                            Last  Test          Frequency    Reason                     Performed    Due Date  --------------------------------------------------------------------------------    Office Visit  12 months..  busPIRone................  05- 04-    [facility] Embedded Care Due Messages. Reference number: [id number].   2/02/2025 5:45:52 AM CST

## 2025-02-03 RX ORDER — MECLIZINE HYDROCHLORIDE 25 MG/1
TABLET ORAL
Qty: 30 TABLET | Refills: 0 | Status: SHIPPED | OUTPATIENT
Start: 2025-02-03

## 2025-02-13 ENCOUNTER — HOSPITAL ENCOUNTER (OUTPATIENT)
Dept: RADIOLOGY | Facility: HOSPITAL | Age: 40
Discharge: HOME | End: 2025-02-13
Payer: MEDICAID

## 2025-02-13 DIAGNOSIS — R11.0 INTRACTABLE NAUSEA: ICD-10-CM

## 2025-02-13 PROCEDURE — 78264 GASTRIC EMPTYING IMG STUDY: CPT | Mod: TC

## 2025-02-13 PROCEDURE — A9541 TC99M SULFUR COLLOID: HCPCS

## 2025-02-13 RX ORDER — TECHNETIUM TC 99M SULFUR COLLOID 2 MG
1 KIT MISCELLANEOUS
Status: COMPLETED | OUTPATIENT
Start: 2025-02-13 | End: 2025-02-13

## 2025-02-13 RX ADMIN — TECHNETIUM TC 99M SULFUR COLLOID 1 MILLICURIE: KIT at 07:02

## 2025-02-17 ENCOUNTER — RESULTS FOLLOW-UP (OUTPATIENT)
Dept: GASTROENTEROLOGY | Facility: CLINIC | Age: 40
End: 2025-02-17
Payer: MEDICAID

## 2025-02-17 NOTE — TELEPHONE ENCOUNTER
Spoke with patient and scheduled follow up on 03/18. V/U    ----- Message from Catalino Raphael MD sent at 2/16/2025  4:02 PM CST -----  Regarding: RE: JACKY  Ill get her appt with me    Cameron, can we get her an appt with me, anytime we have avail  ----- Message -----  From: Darlin Shah NP  Sent: 2/13/2025   9:19 PM CST  To: Catalino Raphael MD  Subject: JACKY                                              Forwarding this to you, one of Almita's patients but you saw her recently in January.   Chronic nausea.   NMS normal  CT entero scheduled for 2/20  You wanted her to follow up in 6 weeks. Nothing is scheduled. Do you want me to follow up?  ----- Message -----  From: Interface, Rad Results In  Sent: 2/13/2025  12:05 PM CST  To: Almita Bryan NP

## 2025-02-19 DIAGNOSIS — Z12.31 OTHER SCREENING MAMMOGRAM: ICD-10-CM

## 2025-02-20 ENCOUNTER — HOSPITAL ENCOUNTER (OUTPATIENT)
Dept: RADIOLOGY | Facility: HOSPITAL | Age: 40
Discharge: HOME | End: 2025-02-20
Attending: INTERNAL MEDICINE
Payer: MEDICAID

## 2025-02-20 ENCOUNTER — RESULTS FOLLOW-UP (OUTPATIENT)
Dept: GASTROENTEROLOGY | Facility: HOSPITAL | Age: 40
End: 2025-02-20
Payer: MEDICAID

## 2025-02-20 DIAGNOSIS — R11.0 INTRACTABLE NAUSEA: ICD-10-CM

## 2025-02-20 DIAGNOSIS — K52.9 COLITIS WITHOUT COMPLICATION: ICD-10-CM

## 2025-02-20 PROCEDURE — 74177 CT ABD & PELVIS W/CONTRAST: CPT | Mod: TC

## 2025-02-20 PROCEDURE — 25500020 PHARM REV CODE 255: Performed by: INTERNAL MEDICINE

## 2025-02-20 RX ADMIN — IOHEXOL 150 ML: 350 INJECTION, SOLUTION INTRAVENOUS at 09:02

## 2025-02-27 ENCOUNTER — HOSPITAL ENCOUNTER (OUTPATIENT)
Dept: RADIOLOGY | Facility: OTHER | Age: 40
Discharge: HOME | End: 2025-02-27
Attending: STUDENT IN AN ORGANIZED HEALTH CARE EDUCATION/TRAINING PROGRAM
Payer: MEDICAID

## 2025-02-27 DIAGNOSIS — Z12.31 OTHER SCREENING MAMMOGRAM: ICD-10-CM

## 2025-02-27 PROCEDURE — 77063 BREAST TOMOSYNTHESIS BI: CPT | Mod: TC

## 2025-03-03 ENCOUNTER — RESULTS FOLLOW-UP (OUTPATIENT)
Dept: PRIMARY CARE CLINIC | Facility: CLINIC | Age: 40
End: 2025-03-03

## 2025-03-03 ENCOUNTER — PATIENT MESSAGE (OUTPATIENT)
Dept: PRIMARY CARE CLINIC | Facility: CLINIC | Age: 40
End: 2025-03-03
Payer: MEDICAID

## 2025-03-05 DIAGNOSIS — R11.0 NAUSEA: ICD-10-CM

## 2025-03-05 DIAGNOSIS — R42 DIZZINESS: ICD-10-CM

## 2025-03-05 RX ORDER — MECLIZINE HYDROCHLORIDE 25 MG/1
TABLET ORAL
Qty: 30 TABLET | Refills: 0 | Status: SHIPPED | OUTPATIENT
Start: 2025-03-05

## 2025-03-05 NOTE — TELEPHONE ENCOUNTER
No care due was identified.  [facility] Embedded Care Due Messages. Reference number: [id number].   3/05/2025 5:45:12 AM CST

## 2025-03-18 ENCOUNTER — OFFICE VISIT (OUTPATIENT)
Dept: GASTROENTEROLOGY | Facility: CLINIC | Age: 40
End: 2025-03-18
Payer: MEDICAID

## 2025-03-18 VITALS — HEIGHT: 71 IN | WEIGHT: 254.44 LBS | BODY MASS INDEX: 35.62 KG/M2

## 2025-03-18 DIAGNOSIS — K52.9 COLITIS WITHOUT COMPLICATION: ICD-10-CM

## 2025-03-18 DIAGNOSIS — R11.0 INTRACTABLE NAUSEA: Primary | ICD-10-CM

## 2025-03-18 DIAGNOSIS — K59.04 CHRONIC IDIOPATHIC CONSTIPATION: ICD-10-CM

## 2025-03-18 PROCEDURE — 1159F MED LIST DOCD IN RCRD: CPT | Mod: CPTII,,, | Performed by: INTERNAL MEDICINE

## 2025-03-18 PROCEDURE — 3008F BODY MASS INDEX DOCD: CPT | Mod: CPTII,,, | Performed by: INTERNAL MEDICINE

## 2025-03-18 PROCEDURE — 99213 OFFICE O/P EST LOW 20 MIN: CPT | Mod: PBBFAC,PN | Performed by: INTERNAL MEDICINE

## 2025-03-18 PROCEDURE — 99214 OFFICE O/P EST MOD 30 MIN: CPT | Mod: S$PBB,,, | Performed by: INTERNAL MEDICINE

## 2025-03-18 PROCEDURE — 99999 PR PBB SHADOW E&M-EST. PATIENT-LVL III: CPT | Mod: PBBFAC,,, | Performed by: INTERNAL MEDICINE

## 2025-03-18 NOTE — PROGRESS NOTES
GASTROENTEROLOGY CLINIC NOTE    Reason for visit: The primary encounter diagnosis was Intractable nausea. Diagnoses of Colitis without complication and Chronic idiopathic constipation were also pertinent to this visit.  Referring provider/PCP: Monae Chapin MD    HPI:  Geri Mosqueda is a 39 y.o. female follow up    Interval  Follow up  Emtpying scan was normal  CT entero was normal, does have fibroids.    Nausea is worse. Ongoing. Not related to eating. Dry heaves. Any time of day. No inciting factors. Meclizine only thing that helps.  Isnt sure if cymbalta is causing it.  No etiology found yet  No relation to better bowel habits, does admit to constipation.      =================================  Initial visit  Longstanding nausea , stomach issues, ongoing for > 1 year , nearly every day. Intermittent dry heaves.  Feels like needs to vomit every 3-4 days. Has the sensation to vomit but wont actually vomit most of times, has strong gag reflex. Gets dizzy associated with nausea at times. Nausea not related to eating. Rx meclizine for dizzy and nausea which does help.   Hx of cluster headaches / migraines , recently as well.   Longstanding constipation, although since the procedures 6 months ago, will go have BM every 2 days.  Has not noticed a relationship to nausea and better bowel habits.    Zofran doesn't help with nausea  Meclizine only thing that works with nausea (and dizzy)    Taking protonix and carafate prn    HP stool was negative, she had stopped the protonix for this test.    Calpro elevated 200s        Initial Visit in May:  Geri Mosqueda is a 38 y.o. female here today for multiple GI symptoms, she is accompanied by her mom. She reports last September developing nausea, came on gradually and then worsened. Associated vomiting as well. She reports vomiting was severe, multiple times/day, vomiting food and dry heaving. She was started on pantoprazole and anti-nausea  medications. She reports no longer vomiting, does have nausea still. Nausea occurs daily, random, not always associated with eating. She denies abd pain or reflux symptoms, no decrease in appetite or early satiety. She does report significant NSAID use months ago d/t chronic HA's. She also reports change in BM's, has never had regular BM's but now alternates between constipation and diarrhea. She is seeing PT for nausea and dizziness. FH of celiac and gastroparesis in mother, CRC in grandmother.     Prior Endoscopy: 2024 with Dr. Raphael:  EGD:  - Normal esophagus.               - Gastritis. Biopsied.               - Normal examined duodenum. Biopsied.   Colon:   - Three 4 to 5 mm polyps in the sigmoid colon and in the transverse colon, removed with a cold snare. Resected and retrieved.                - Lipomatous ileocecal valve.                - Erythematous and nodular mucosa in the recto-sigmoid colon. Biopsied.                - The examination was otherwise normal on direct and retroflexion views.    - Repeat colonoscopy in 5 years for surveillance.     Final Pathologic Diagnosis 1. DUODENUM, BIOPSY:  Duodenal mucosa with no significant pathologic abnormality  Villous architecture is maintained    2. STOMACH, BIOPSY:  Helicobacter pylori associated chronic active gastritis and reactive changes suggestive of proton pump inhibitor therapy  No evidence of intestinal metaplasia, dysplasia or malignancy  Few Helicobacter pylori organisms identified on H&E stain    3. TRANSVERSE COLON, POLYPECTOMY:  Tubular adenoma  A separate fragment of benign colonic mucosa with lymphoid aggregate    4. SIGMOID COLON, POLYPECTOMY:  Food particles only  No tissue identified    5. RECTOSIGMOID COLON, BIOPSY:  Benign colonic mucosa with non-specific chronic inflammatory changes  No evidence of active colitis, granuloma, dysplasia or malignancy       (Portions of this note were dictated using voice recognition software and may contain  dictation related errors in spelling/grammar/syntax not found on text review)    Review of Systems   Constitutional:  Negative for fever and malaise/fatigue.   Respiratory:  Negative for cough and shortness of breath.    Cardiovascular:  Negative for chest pain and palpitations.   Gastrointestinal:  Positive for abdominal pain and nausea. Negative for blood in stool.   Neurological:  Positive for dizziness and headaches.       Past Medical History: has a past medical history of Allergy, Blood clotting tendency, DVT (deep venous thrombosis), Follicular adenoma of thyroid gland, Keloid cicatrix, PCOS (polycystic ovarian syndrome), and PE (pulmonary embolism).    Past Surgical History: has a past surgical history that includes Tonsillectomy; Thyroid lobectomy (Left, 02/06/2014); Colonoscopy (N/A, 07/30/2019); Nasal septum surgery (03/2019); Ankle surgery (Right); Colonoscopy (N/A, 5/24/2024); and Esophagogastroduodenoscopy (N/A, 5/24/2024).    Home medications:   Current Outpatient Medications on File Prior to Visit   Medication Sig Dispense Refill    albuterol (VENTOLIN HFA) 90 mcg/actuation inhaler Inhale 2 puffs into the lungs every 6 (six) hours as needed for Wheezing or Shortness of Breath. Rescue 18 g 11    busPIRone (BUSPAR) 7.5 MG tablet TAKE 1 TABLET(7.5 MG) BY MOUTH TWICE DAILY 180 tablet 3    DULoxetine (CYMBALTA) 60 MG capsule TAKE 1 CAPSULE(60 MG) BY MOUTH EVERY DAY (Patient taking differently: Take 60 mg by mouth 2 (two) times daily.) 90 capsule 3    meclizine (ANTIVERT) 25 mg tablet TAKE 1 TABLET(25 MG) BY MOUTH DAILY AS NEEDED FOR DIZZINESS 30 tablet 0    pantoprazole (PROTONIX) 40 MG tablet TAKE 1 TABLET(40 MG) BY MOUTH DAILY 90 tablet 3    meloxicam (MOBIC) 15 MG tablet Take 1 tablet (15 mg total) by mouth once daily. (Patient not taking: Reported on 10/23/2024) 30 tablet 0     Current Facility-Administered Medications on File Prior to Visit   Medication Dose Route Frequency Provider Last Rate Last  "Admin    levonorgestreL (MIRENA) 21 mcg/24 hours (8 yrs) 52 mg IUD 1 Intra Uterine Device  1 Intra Uterine Device Intrauterine  [place], Dyan AGUILAR MD   1 Intra Uterine Device at 01/16/24 1615       Vital signs:  Ht 5' 11" (1.803 m)   Wt 115.4 kg (254 lb 6.6 oz)   BMI 35.48 kg/m²     Physical Exam  Vitals reviewed.   Constitutional:       Appearance: She is not toxic-appearing.   Abdominal:      General: There is no distension.      Palpations: Abdomen is soft.      Tenderness: There is no abdominal tenderness.   Neurological:      Mental Status: She is alert.       I have reviewed associated labs, imaging and notes.     Assessment:  1. Intractable nausea    2. Colitis without complication    3. Chronic idiopathic constipation        HP gastritis - treated with quad therapy, stool testing negative    Mild sigmoid colitis of unclear etiology , possibly NSAID?  Calpro 200s , bowels are now actually improved (stopped nsaids recently before calpro test was done)  No symptoms of diarrhea, etc.    Nausea with assoc dizzyness, also hx of cluster/ migraines ?   Unclear but improved with meclizine (the only thing that helps the nausea) and thus perhaps favor a non-GI etiology of nausea / dizzy      Plan:       I have lower suspicion her nausea is from a GI etiology, no relation to eating, no other GI symptoms besides mild constipation which the nausea does not get better with improved bowel habits.    CT entero and NM emptying negative    I suggest review with PCP , consider head and neck / ent causes, consider med causes (cymbalta?)    I also suggest review with GYN for possible fibroids contributing    If other etiologies firmly ruled out , we can consider  - u/s mesenteric dopplers  - hida with ef  - trial of meslamine    Continue protonix  Continue meclizine      RTC based on above.    Catalino Raphael MD  [facility]          "

## 2025-04-02 ENCOUNTER — TELEPHONE (OUTPATIENT)
Dept: OTOLARYNGOLOGY | Facility: CLINIC | Age: 40
End: 2025-04-02
Payer: COMMERCIAL

## 2025-04-02 NOTE — TELEPHONE ENCOUNTER
4/2@8:54am LM on patients VM no longer taking new patients, but could put with the other general ENT or Gem

## 2025-04-02 NOTE — TELEPHONE ENCOUNTER
----- Message from Jon sent at 4/1/2025  3:18 PM CDT -----  Contact: Pt   .Type:  Needs Medical AdviceWho Called: ptSymptoms (please be specific):  dizziness/ nausea Would the patient rather a call back or a response via [facility]?  Call [facility] Call Back Number:  [phone] Information: pt. Is calling to establish care with the provider.

## 2025-04-09 DIAGNOSIS — R42 DIZZINESS: ICD-10-CM

## 2025-04-09 DIAGNOSIS — R11.0 NAUSEA: ICD-10-CM

## 2025-04-09 NOTE — TELEPHONE ENCOUNTER
----- Message from Jasmina sent at 4/9/2025 11:24 AM CDT -----  Requesting an RX refill or new RX.Is this a refill or new RX: refill 1RX name and strength (copy/paste from chart):  meclizine (ANTIVERT) 25 mg tabletIs this a 30 day or 90 day RX: Pharmacy name and phone # (copy/paste from chart):  [id number] [address] & [facility] Phone: [phone]: [phone] doctors have asked that we provide their patients with the following 2 reminders -- prescription refills can take up to 72 hours, and a friendly reminder that in the future you can use your [facility] account to request refills:

## 2025-04-09 NOTE — TELEPHONE ENCOUNTER
Care Due:                  Date            Visit Type   Department     Provider  --------------------------------------------------------------------------------                                ESTABLISHED                              PATIENT -    [facility]  Last Visit: 05-      [facility]      CARE           Monae Chapin  Next Visit: None Scheduled  None         None Found                                                            Last  Test          Frequency    Reason                     Performed    Due Date  --------------------------------------------------------------------------------    Office Visit  12 months..  busPIRone................  05- 04-    [facility] Embedded Care Due Messages. Reference number: [id number].   4/09/2025 11:38:41 AM CDT

## 2025-04-10 RX ORDER — MECLIZINE HYDROCHLORIDE 25 MG/1
25 TABLET ORAL 3 TIMES DAILY
Qty: 30 TABLET | Refills: 0 | Status: SHIPPED | OUTPATIENT
Start: 2025-04-10

## 2025-04-17 ENCOUNTER — OFFICE VISIT (OUTPATIENT)
Dept: PRIMARY CARE CLINIC | Facility: CLINIC | Age: 40
End: 2025-04-17
Payer: COMMERCIAL

## 2025-04-17 DIAGNOSIS — R42 DIZZINESS AND GIDDINESS: ICD-10-CM

## 2025-04-17 DIAGNOSIS — R42 DIZZINESS: Primary | ICD-10-CM

## 2025-04-17 PROCEDURE — 98004 SYNCH AUDIO-VIDEO EST SF 10: CPT | Mod: 95,,, | Performed by: STUDENT IN AN ORGANIZED HEALTH CARE EDUCATION/TRAINING PROGRAM

## 2025-04-17 RX ORDER — DULOXETIN HYDROCHLORIDE 60 MG/1
60 CAPSULE, DELAYED RELEASE ORAL 2 TIMES DAILY
Qty: 180 CAPSULE | Refills: 3 | Status: SHIPPED | OUTPATIENT
Start: 2025-04-17

## 2025-04-17 NOTE — PROGRESS NOTES
Telehealth Visit    The patient location is: [place]   The chief complaint leading to consultation is: Follow up     Visit type: audiovisual      Face to Face time with patient: 10  minutes  15 minutes of total time spent on the encounter, which includes face to face time and non-face to face time preparing to see the patient (eg, review of tests), Obtaining and/or reviewing separately obtained history, Documenting clinical information in the electronic or other health record, Independently interpreting results (not separately reported) and communicating results to the patient/family/caregiver, or Care coordination (not separately reported).       HPI    Patient is a 39 y.o.   Geri Garcia Pleasant  has a past medical history of Allergy, Blood clotting tendency, DVT (deep venous thrombosis) (02/11/2014), Follicular adenoma of thyroid gland (02/06/2014), Keloid cicatrix, PCOS (polycystic ovarian syndrome), and PE (pulmonary embolism) (02/11/2014).       History of Present Illness    CHIEF COMPLAINT:  Patient presents today for follow up of chronic dizziness and nausea.    HISTORY OF PRESENT ILLNESS:  She experiences daily dizziness and nausea occurring both with positional changes and at rest. These symptoms were initially attributed to H. pylori, which resolved after treatment with pantoprazole and sucralfate. She also had headaches that persisted for over a month but resolved. Dizziness and nausea have persisted. Recent ENT evaluation, including hearing test, showed normal results. Previous physical therapy evaluation for BPPV demonstrated normal performance on all vestibular exercises.           Active Medications:  Current Outpatient Medications   Medication Instructions    albuterol (VENTOLIN HFA) 90 mcg/actuation inhaler 2 puffs, Inhalation, Every 6 hours PRN, Rescue    busPIRone (BUSPAR) 7.5 mg, Oral, 2 times daily    DULoxetine (CYMBALTA) 60 mg, Oral, 2 times daily    meclizine (ANTIVERT) 25 mg, Oral, 3  times daily    pantoprazole (PROTONIX) 40 mg, Oral       Physical Exam    General: Does not appear to be in acute distress      Assessment & Plan        DIZZINESS:  BPPV ruled out  Continue meclizine  -CT head   -Referred to neurology         Orders Placed This Encounter    DULoxetine (CYMBALTA) 60 MG capsule         Upcoming Scheduled Appointments and Follow Up:    Future Appointments   Date Time Provider Department Center   5/8/2025  3:20 PM Joyce Harper NP [facility]   8/14/2025  1:40 PM Shannan Brito MD [facility]       Follow Up [facility] (with who? when?): No follow-ups on file.        Extended Emergency Contact Information  Primary Emergency Contact: Annia Mcneill  Address: [address] [place] [address], [address]           [place] [address] [place]  Mobile Phone: [phone]  Relation: Mother  Secondary Emergency Contact: Latrice Lerma  Address: [address] [place] [address], [address] [place] [address] [place]  Mobile Phone: [phone]  Relation: Roommate      Monae Chapin MD   Internal Medicine  4/17/2025 - 4:17 PM        Each patient to whom he or she provides medical services by telemedicine is:  (1) informed of the relationship between the physician and patient and the respective role of any other health care provider with respect to management of the patient; and (2) notified that he or she may decline to receive medical services by telemedicine and may withdraw from such care at any time.    This note was generated with the assistance of ambient listening technology. Verbal consent was obtained by the patient and accompanying visitor(s) for the recording of patient appointment to facilitate this note. I attest to having reviewed and edited the generated note for accuracy, though some syntax or spelling errors may persist. Please contact the author of this note for any clarification.      While patients have the right to  access their medical record, it is essential to recognize that progress notes primarily serve as a means of communication among healthcare professionals.

## 2025-05-08 ENCOUNTER — OFFICE VISIT (OUTPATIENT)
Dept: OBSTETRICS AND GYNECOLOGY | Facility: CLINIC | Age: 40
End: 2025-05-08
Payer: COMMERCIAL

## 2025-05-08 ENCOUNTER — LAB VISIT (OUTPATIENT)
Dept: LAB | Facility: HOSPITAL | Age: 40
End: 2025-05-08
Payer: COMMERCIAL

## 2025-05-08 VITALS — WEIGHT: 250.44 LBS | BODY MASS INDEX: 34.93 KG/M2

## 2025-05-08 DIAGNOSIS — Z12.39 SCREENING BREAST EXAMINATION: ICD-10-CM

## 2025-05-08 DIAGNOSIS — Z86.018 HISTORY OF UTERINE FIBROID: ICD-10-CM

## 2025-05-08 DIAGNOSIS — Z01.419 ENCOUNTER FOR GYNECOLOGICAL EXAMINATION WITHOUT ABNORMAL FINDING: Primary | ICD-10-CM

## 2025-05-08 DIAGNOSIS — Z01.419 ENCOUNTER FOR GYNECOLOGICAL EXAMINATION WITHOUT ABNORMAL FINDING: ICD-10-CM

## 2025-05-08 DIAGNOSIS — R10.2 FEMALE PELVIC PAIN: ICD-10-CM

## 2025-05-08 PROCEDURE — 99999 PR PBB SHADOW E&M-EST. PATIENT-LVL III: CPT | Mod: PBBFAC,,,

## 2025-05-08 PROCEDURE — 87389 HIV-1 AG W/HIV-1&-2 AB AG IA: CPT

## 2025-05-08 PROCEDURE — 86803 HEPATITIS C AB TEST: CPT

## 2025-05-08 PROCEDURE — 1159F MED LIST DOCD IN RCRD: CPT | Mod: CPTII,S$GLB,,

## 2025-05-08 PROCEDURE — 99396 PREV VISIT EST AGE 40-64: CPT | Mod: S$GLB,,,

## 2025-05-08 PROCEDURE — 3008F BODY MASS INDEX DOCD: CPT | Mod: CPTII,S$GLB,,

## 2025-05-08 PROCEDURE — 36415 COLL VENOUS BLD VENIPUNCTURE: CPT

## 2025-05-08 PROCEDURE — 87340 HEPATITIS B SURFACE AG IA: CPT

## 2025-05-08 PROCEDURE — 86593 SYPHILIS TEST NON-TREP QUANT: CPT

## 2025-05-08 RX ORDER — DEXTROAMPHETAMINE SACCHARATE, AMPHETAMINE ASPARTATE, DEXTROAMPHETAMINE SULFATE AND AMPHETAMINE SULFATE 2.5; 2.5; 2.5; 2.5 MG/1; MG/1; MG/1; MG/1
1 TABLET ORAL EVERY MORNING
COMMUNITY
Start: 2024-11-14

## 2025-05-08 NOTE — PROGRESS NOTES
CC: Annual  HISTORY OF PRESENT ILLNESS:    Geri Mosqueda is a 39 y.o. female, , Patient's last menstrual period was 2025.,  presents for a routine exam and has complaints of intermittent pelvic pain.  Reports history of uterine fibroids.  Last PUS in  showed 3.2 cm fibroid.   She is not currently sexually active.  Last encounter .  She uses IUD for contraception.  History of STDs: denies.  She does want STD screening.  Denies any other GYN complaints.      The patient participates in regular exercise: no (has strenuous job).  The patient does not smoke.  The patient wears seatbelts.   Pt denies any domestic violence. Reports tattoos or blood transfusions    SCREENING:   Pap:  nilm/ hpv + (colp neg) (repeated today)   Mammogram: 2025 neg, TC Score: 5.38 %   Colonoscopy: N/A   DEXA:  N/A     GYN FH:   Breast cancer: none  Colon cancer: none  Ovarian cancer: none  Endometrial cancer: none     OB History    Para Term  AB Living   0 0 0 0 0 0   SAB IAB Ectopic Multiple Live Births   0 0 0 0 0        Past Medical History:  Past Medical History:   Diagnosis Date    Allergy     seasonal    Blood clotting tendency     DVT (deep venous thrombosis) 2014    after surgery    Follicular adenoma of thyroid gland 2014    Keloid cicatrix     PCOS (polycystic ovarian syndrome)     PE (pulmonary embolism) 2014    after surgery       Past Surgical History:  Past Surgical History:   Procedure Laterality Date    ANKLE SURGERY Right     COLONOSCOPY N/A 2019    Procedure: COLONOSCOPY/Suprep;  Surgeon: mEre Carcamo MD;  Location: [facility] ENDO;  Service: Endoscopy;  Laterality: N/A;    COLONOSCOPY N/A 2024    Procedure: COLONOSCOPY;  Surgeon: Catalino Raphael MD;  Location: [facility] ENDO;  Service: Endoscopy;  Laterality: N/A;  -precall complete-MS    ESOPHAGOGASTRODUODENOSCOPY N/A 2024    Procedure: EGD (ESOPHAGOGASTRODUODENOSCOPY);  Surgeon: Donavon  Catalino DUMONT MD;  Location: [facility] ENDO;  Service: Endoscopy;  Laterality: N/A;    NASAL SEPTUM SURGERY  03/2019    Dr. Aceves    THYROID LOBECTOMY Left 02/06/2014    and isthmusectomy    TONSILLECTOMY         Family History:  Family History   Problem Relation Name Age of Onset    Depression Mother      Diabetes Mother      Hypertension Mother      No Known Problems Father      Cancer Brother      Heart attack Maternal Grandmother      Hypertension Maternal Grandmother      Colon cancer Maternal Grandmother      Diabetes Paternal Grandmother      Hypertension Daughter      Heart disease Neg Hx      Anemia Neg Hx      Arrhythmia Neg Hx      Asthma Neg Hx      Clotting disorder Neg Hx      Fainting Neg Hx      Heart failure Neg Hx      Hyperlipidemia Neg Hx      Melanoma Neg Hx      Breast cancer Neg Hx      Ovarian cancer Neg Hx         Allergies:  Review of patient's allergies indicates:   Allergen Reactions    Eggplant Anaphylaxis, Itching and Other (See Comments)    Percocet [oxycodone-acetaminophen]      States she can tolerate tylenol       Medications:  Medications Ordered Prior to Encounter[1]    Social History:  Social History[2]            ROS:   GENERAL: Feeling well overall. Denies fever or chills.   SKIN: Denies rash or lesions.   HEAD: Denies head injury or headache.   NODES: Denies enlarged lymph nodes.   CHEST: Denies chest pain or shortness of breath.   CARDIOVASCULAR: Denies palpitations or left sided chest pain.   ABDOMEN: No abdominal pain, constipation, diarrhea, nausea, vomiting or rectal bleeding.   URINARY: No dysuria, hematuria, or burning on urination.  REPRODUCTIVE: See HPI.   BREASTS: Denies pain, lumps, or nipple discharge.   HEMATOLOGIC: No easy bruisability or excessive bleeding.   MUSCULOSKELETAL: Denies joint pain or swelling.   NEUROLOGIC: Denies syncope or weakness.   PSYCHIATRIC: Denies depression, anxiety or mood swings.    PE:   APPEARANCE: Well nourished, well developed, Black or   female in no acute distress.  NODES: no cervical, supraclavicular, or inguinal lymphadenopathy  BREASTS: Symmetrical, no skin changes or visible lesions. No palpable masses, nipple discharge or adenopathy bilaterally.  ABDOMEN: Soft. No tenderness or masses. No distention. No hernias palpated. No CVA tenderness.  VULVA: No lesions. Normal external female genitalia.  URETHRAL MEATUS: Normal size and location, no lesions, no prolapse.  URETHRA: No masses, tenderness, or prolapse.  VAGINA: Moist. No lesions or lacerations noted. No abnormal discharge present. No odor present. IUD strings visible at os (2 cm out)    CERVIX: No lesions or discharge. No cervical motion tenderness.   UTERUS: Normal size, regular shape, mobile, non-tender.  ADNEXA: No tenderness. No fullness or masses palpated in the adnexal regions.   ANUS PERINEUM: Normal.      Diagnosis:  1. Encounter for gynecological examination without abnormal finding    2. Screening breast examination    3. Female pelvic pain    4. History of uterine fibroid        Plan:     Orders Placed This Encounter    US Pelvis Comp with Transvag NON-OB (xpd    C. trachomatis/N. gonorrhoeae by AMP DNA    Treponema Pallidium Antibodies IgG, IgM    Hepatitis B Surface Antigen    Hepatitis C Antibody    HIV 1/2 Ag/Ab (4th Gen)    Liquid-Based Pap Smear, Screening     - Self breast exams encouraged  - Pap and HPV done today.  - Screening tests as ordered.  - Diet and exercise encouraged.  Condom use encouraged for STD prevention.  Seat belt use encouraged.  Reviewed ASCCP Pap guidelines and screening recommendations.  Calcium and vitamin D recommended.     Counseling: injury prevention: Driving under the influence of alcohol  Weapons  Seatbelts  Bicycle helmets  Adequate sleep  Exercise  Stress management techniques  indications for and frequency of periodic gynecologic exam  reviewed current Pap guidelines. Explained new understanding of natural history of  cervical disease and improved Paps. Recommended guideline concordant care.    The patient was counseled today on ACS PAP guidelines, with recommendations for yearly pelvic exams unless their uterus, cervix, and ovaries were removed for benign reasons; in that case, examinations every 3-5 years are recommended.  The patient was also counseled regarding monthly breast self-examination, routine STD screening for at-risk populations, prophylactic immunizations for transmitted infections such as  HPV, Pertussis, or Influenza as appropriate, and yearly mammograms when indicated by ACS guidelines.  She was advised to see her primary care physician for all other health maintenance.    Counseling session lasted approximately 10 minutes, and all her questions were answered.    Follow-up with me in 1 year for routine exam or sooner if needed.    KYM Millan-BC                 [1]   Current Outpatient Medications on File Prior to Visit   Medication Sig Dispense Refill    busPIRone (BUSPAR) 7.5 MG tablet TAKE 1 TABLET(7.5 MG) BY MOUTH TWICE DAILY 180 tablet 3    dextroamphetamine-amphetamine 10 mg Tab Take 1 tablet by mouth every morning.      DULoxetine (CYMBALTA) 60 MG capsule Take 1 capsule (60 mg total) by mouth 2 (two) times daily. 180 capsule 3    albuterol (VENTOLIN HFA) 90 mcg/actuation inhaler Inhale 2 puffs into the lungs every 6 (six) hours as needed for Wheezing or Shortness of Breath. Rescue 18 g 11    meclizine (ANTIVERT) 25 mg tablet Take 1 tablet (25 mg total) by mouth 3 (three) times daily. 30 tablet 0    pantoprazole (PROTONIX) 40 MG tablet TAKE 1 TABLET(40 MG) BY MOUTH DAILY 90 tablet 3     Current Facility-Administered Medications on File Prior to Visit   Medication Dose Route Frequency Provider Last Rate Last Admin    levonorgestreL (MIRENA) 21 mcg/24 hours (8 yrs) 52 mg IUD 1 Intra Uterine Device  1 Intra Uterine Device Intrauterine  [place], Dyan AGUILAR MD   1 Intra Uterine Device at 01/16/24 4802    [2]   Social History  Tobacco Use    Smoking status: Some Days     Types: Cigars    Smokeless tobacco: Never   Substance Use Topics    Alcohol use: Yes     Comment: occ    Drug use: Yes     Frequency: 3.0 times per week     Types: Marijuana

## 2025-05-09 ENCOUNTER — RESULTS FOLLOW-UP (OUTPATIENT)
Dept: OBSTETRICS AND GYNECOLOGY | Facility: CLINIC | Age: 40
End: 2025-05-09

## 2025-05-09 DIAGNOSIS — Z30.09 BIRTH CONTROL COUNSELING: Primary | ICD-10-CM

## 2025-05-09 LAB
HBV SURFACE AG SERPL QL IA: NORMAL
HCV AB SERPL QL IA: NORMAL
HIV 1+2 AB+HIV1 P24 AG SERPL QL IA: NORMAL
T PALLIDUM IGG+IGM SER QL: NORMAL

## 2025-05-16 ENCOUNTER — OFFICE VISIT (OUTPATIENT)
Dept: NEUROLOGY | Facility: CLINIC | Age: 40
End: 2025-05-16
Payer: COMMERCIAL

## 2025-05-16 DIAGNOSIS — H81.4 VERTIGO OF CENTRAL ORIGIN: ICD-10-CM

## 2025-05-16 DIAGNOSIS — G43.009 MIGRAINE WITHOUT AURA AND WITHOUT STATUS MIGRAINOSUS, NOT INTRACTABLE: ICD-10-CM

## 2025-05-16 DIAGNOSIS — H81.93 VESTIBULOPATHY OF BOTH EARS: Primary | ICD-10-CM

## 2025-05-16 DIAGNOSIS — H93.A9 PULSATILE TINNITUS, UNSPECIFIED EAR: ICD-10-CM

## 2025-05-16 RX ORDER — SUMATRIPTAN SUCCINATE 100 MG/1
TABLET ORAL
Qty: 12 TABLET | Refills: 11 | Status: SHIPPED | OUTPATIENT
Start: 2025-05-16

## 2025-05-16 NOTE — PROGRESS NOTES
Patient ID: [id number]  The patient location is: [place], [place]  The chief complaint leading to consultation is: Headaches    Visit type: audiovisual    Face to Face time with patient: 22    Each patient to whom he or she provides medical services by telemedicine is:  (1) informed of the relationship between the physician and patient and the respective role of any other health care provider with respect to management of the patient; and (2) notified that he or she may decline to receive medical services by telemedicine and may withdraw from such care at any time.    Notes:     Subjective:     HPI:  Geri Mosqueda is a very pleasant 39 y.o. female who  has a past medical history of Allergy, Blood clotting tendency, DVT (deep venous thrombosis), Follicular adenoma of thyroid gland, Keloid cicatrix, PCOS (polycystic ovarian syndrome), and PE (pulmonary embolism). she is presenting today as a new patient for evaluation of dizziness and headaches.     Headache history  Age at onset: fall of 2023  Course over time: decreasing in frequency  Location: holocephalic initially now frontal  Character: sharp and pressure  Intensity: 3 on a scale from 1 to 10, used to be 8-10  Frequency: once per month. Used to be more frequent initially  Duration: 1-2 hours  Timing: do not seem to be related to any time of the day   Mild/moderate/severe. Work attendance or other daily activities are not affected by the headaches.  Aura: not preceded by an aura  Associated symptoms: Photosensitivity and Phonophobia   Associated neurologic symptoms: dizziness at baseline   Precipitating factors: None which have been determined  Aggravating factors: none  Home treatment: Imitrex with some improvement.   ER visits: No  Positive Hx of: none  Is medication overuse contributing to the patient's current migraine burden: No    dizziness can come on at any time or with any posture, even when lying down still.   More sensitive to motion  sickness. Occasionally room spinning most times just feels fuzzy and off balance.   Reports bilateral (R>L) pulsatile tinnitus. Nausea but no blurry vision, reports occasional double vision.    Headache Medication history:  AED Neuromodulators  MAOIs  Ergot Alkaloids    Acetazolamide (Diamox) [] Phenelzine (Nardil) [] Dihydroergotamine (Migranal) []   Carbamazepine (Tegretol) [] Tranylcypromine (Parnate) [] Ergotamine (Ergomar) []   Gabapentin (Neurontin) [] Antihistamine/Serotonergic  Triptans    Lacosamide (Vimpat) [] Cyproheptadine (Periactin) [] Almotriptan (Axert) []   Lamotrigine (Lamictal) [] Antihypertensives  Eletriptan (Relpax) []   Levatiracetam (Keppra) [] Atenolol (Tenormin) [] Frovatriptan (Frova) []   Oxcarbazepine (Trileptal) [] Bisoprostol (Zebeta) [] Naratriptan (Amerge) []   Levetiracetam (Keppra) [] Candesartan (Atacand) [] Rizatriptan (Maxalt) []   Pregabalin (Lyrica) [] Carvedilol (Coreg)  Sumatriptan (Imitrex) (effective) [x]   Topiramate (Topamax)  (Trokendi) [] Diltiazem (Cardizem) [] Zolmitriptan (Zomig) []   Valproic Acid (Depakote) (Divalproex Sodium) [] Lisinopril (Prinivil, Zestril) [] Combo Abortives    Zonisamide (Zonegran) [] Metoprolol (Toprol) [] BC Powder    Muscle relaxants  Nadolol (Corgard) [] Butalbital and Acetaminophen (Bupap) []   Methocarbamol (Robaxin) [] Nebivolol (Bystolic) [] Butalbital, Acetaminophen, and caffeine (Fioricet) [x]   Baclofen (Lioresal) [] Nicardipine (Cardene) []     Cyclobenzaprine (Flexeril) [] Nimodipine (Nimotop) [] Butalbital, Aspirin, and caffeine (Fiorinal) []   Tizanidine (Zanaflex) [] Propranolol (Inderal) []     Benzodiazepines  Telmisartan (Micardis) [] Butalbital, Caffeine, Acetaminophen, and Codeine (Fioricet with Codeine) []   Clonazepam (Klonopin) [] Timolol (Blocadren) []     Alprazolam (Xanax) [] Verapamil (Calan, Verelan) [] Butalbital, Caffeine, Aspirin, and Codeine  (Fiorinal with Codeine) []   Diazepam (Valium) [] NSAIDs       Lorazepam (Ativan) [] Acetaminophen (Tylenol) []     Antidepressants   Acetylsalicylic Acid (Aspirin) [] Aspirin, Caffeine, and Acetaminophen (Excedrin) (Goodys) []   Amitriptyline (Elavil) [] Celecoxib (Celebrex, ElYXYB) []     Bupropion (Wellbutrin) [] Diclofenac (Cambia) []     Citalopram (Celexa) [] Ibuprofen (Motrin, Advil) [] Acetaminophen, Caffeine, Pyrilamine maleate (Midol) []   Desipramine (Norpramin) [] Indomethacin (Indocin) []     Desvenlafazine (Pristiq) [] Ketoprofen (Orudis) [] Acetaminophen, Dichloralphenazone, and Isometheptene (Midrin) []   Doxepin (Sinequan) [] Ketorolac (Toradol, SPRIX) []     Duloxetine (Cymbalta) [x] Naproxen (Anaprox, Aleve) []     Escitalopram (Lexapro) [] Meclofenamic Acid (Meclomen) [] Procedures    Fluoxetine (Prozac) [] Meloxicam (Mobic) [] Greater occipital nerve block []   Imipramine (Tofranil) [] Monoclonals  Cervical radiofrequency ablation []   Nortriptyline (Pamelor) [] Erenumab-aooe (Aimovig) [] Spenopalatine ganglion block []   Protriptyline (Vivactil) [] Galcanezumab (Emgality) [] Occipital neuro stimulation []   Trazodone (Desyrel, Oleptro) [] Fremanazumab-vfrm (Ajovy) [] Cervical Epidural steroid injection []   Venlafaxine (Effexor) [] Eptinezumab (Vyepti) [] Facet joint injections []   Oral CGRP inhibitors  Neuromodulation devices   Transforaminal epidural steroid injection []   Atogepant (Qulipta) [] Cefaly [] Cervical medial branch blocks []   Rimegepant (Nurtec) [] Gamma Core [] Botox []   Ubrogepant (Ubrelvy) [] Nerivio [] iovera []   Zavegepant (Zavzpret) [] Transcranial Magnetic stimulation [] Antiemetics    Other    Prochlorperazine (Compazine) []   Memantine (Namenda) []   Metoclopramide (Reglan)  []   Magnesium Oxide []   Promethazine (Phenergan)  []   Riboflavin (B2) []   Ondansetron (Zofran) []   Co Enzyme Q10 []   Meclizine (Antivert, Dramamine) [x]     Review of Systems:  Review of Systems   HENT:  Negative for congestion, sinus pain and  tinnitus.    Eyes:  Positive for double vision and photophobia. Negative for blurred vision.   Gastrointestinal:  Positive for nausea. Negative for vomiting.   Musculoskeletal:  Negative for falls and neck pain.   Neurological:  Positive for dizziness and headaches. Negative for sensory change and focal weakness.   Psychiatric/Behavioral:  The patient does not have insomnia.    All other systems reviewed and are negative.       Past Medical History:  -------------------------------------    Allergy    seasonal    Blood clotting tendency    DVT (deep venous thrombosis)    after surgery    Follicular adenoma of thyroid gland    Keloid cicatrix    PCOS (polycystic ovarian syndrome)    PE (pulmonary embolism)    after surgery       Allergies:  Review of patient's allergies indicates:   Allergen Reactions    Eggplant Anaphylaxis, Itching and Other (See Comments)    Percocet [oxycodone-acetaminophen]      States she can tolerate tylenol       Pertinent Family History:  Family History   Problem Relation Name Age of Onset    Depression Mother      Diabetes Mother      Hypertension Mother      No Known Problems Father      Cancer Brother      Heart attack Maternal Grandmother      Hypertension Maternal Grandmother      Colon cancer Maternal Grandmother      Diabetes Paternal Grandmother      Hypertension Daughter      Heart disease Neg Hx      Anemia Neg Hx      Arrhythmia Neg Hx      Asthma Neg Hx      Clotting disorder Neg Hx      Fainting Neg Hx      Heart failure Neg Hx      Hyperlipidemia Neg Hx      Melanoma Neg Hx      Breast cancer Neg Hx      Ovarian cancer Neg Hx         Pertinent Social History:  Social History[1]    Medications:  Current Outpatient Medications   Medication Instructions    albuterol (VENTOLIN HFA) 90 mcg/actuation inhaler 2 puffs, Inhalation, Every 6 hours PRN, Rescue    busPIRone (BUSPAR) 7.5 mg, Oral, 2 times daily    dextroamphetamine-amphetamine 10 mg Tab 1 tablet, Every morning    DULoxetine  (CYMBALTA) 60 mg, Oral, 2 times daily    meclizine (ANTIVERT) 25 mg, Oral, 3 times daily    pantoprazole (PROTONIX) 40 mg, Oral        Objective:     *exam is limited due to the virtual nature of this visit    General:  Well-appearing, well-nourished, NAD, cooperative    Neurologic Exam:   Awake, alert and oriented x3  Speech spontaneous and fluent, intact comprehension.   Adequate fund of knowledge, vocabulary.  EOM intact. No ophthalmoplegia.   Facial expression is full and symmetric.   Hearing is intact.  Antigravity in BUE. No tremor.      Pertinent lab results  Lab Results   Component Value Date    GUNZYWDQ38CR 38 09/06/2023    [id number] 38 09/06/2023     Lab Results   Component Value Date    ANASCREEN Negative <1:160 05/12/2018    ANTISSAANTIB 1.72 05/12/2018    RF <13.0 09/06/2023    SEDRATE <2 09/06/2023    APAISOTYPEIG <9.40 09/06/2023    APAISOTYPEIG <9.40 09/06/2023    COMPLEMENTC4 30 05/12/2018    COMPLEMENTC3 171 05/12/2018    TTGIGA 0.3 05/17/2024     Lab Results   Component Value Date    [id number] Non-Reactive 05/08/2025    RPR Non-reactive 05/12/2018    HEPBSAG Non-Reactive 05/08/2025     Lab Results   Component Value Date     05/17/2024    TSH 2.309 01/30/2025    FREET4 0.95 09/06/2023    WBC 5.72 01/30/2025    LYMPH 2.2 01/30/2025    LYMPH 39.0 01/30/2025    RBC 5.09 01/30/2025    HGB 12.0 01/30/2025    HCT 38.3 01/30/2025    MCV 75 (L) 01/30/2025     01/30/2025     01/30/2025    K 4.0 01/30/2025    CO2 23 01/30/2025    BUN 10 01/30/2025    CREATININE 0.8 01/30/2025    CALCIUM 8.8 01/30/2025    AST 15 01/30/2025    ALT 16 01/30/2025       Pertinent imaging results  *No relevant imaging available to review     Other pertinent studies  None    Assessment:   Geri Mosqueda is a 39 y.o. female with Hx of thromboembolism, insomnia, and LAYNE who presents for evaluation of dizziness and headaches. Features of initial headaches are suggestive of migraines without aura but  they have subsided noticeably in intensity and frequency. For rescue she can continue Imitrex.     Regarding dizziness, even though she has features of peripheral vestibulopathy I'd recommend imaging of the brain to exclude intracranial pathologies given the presence of pulsatile tinnitus and occasional double vision.     1. Pulsatile tinnitus of both ears    2. Vestibulopathy of both ears    3. Vertigo    4. Pulsatile tinnitus, unspecified ear    5. Vertigo of central origin      Plan:     - MRI IAC/Temporal Bones W W/O Contrast; Future  - Basic metabolic panel; Future  - sumatriptan (IMITREX) 100 MG tablet; Take half to one tablet (50 to 100 mg) at the onset of the headaches. May repeat the dose in 2 hours if headaches recur. Maximum daily dose: 200 mg  Dispense: 12 tablet; Refill: 11        Disclaimer: This note was partly generated using dictation software which may occasionally result in transcription errors that are missed on review.      Based on our encounter today, my overall Medical Decision Making is a Level 4     Complexity of Problem: Moderate (1 undiagnosed new problem with uncertain prognosis)  Complexity of Data: Moderate (Review of >3 unique test results and Ordering each unique test)  Risk of Complications and/or morbidity/mortality of Management: Moderate risk (Prescription drug management)        Perla Mccauley MD    [facility]  05/16/2025          [1]   Social History  Tobacco Use    Smoking status: Some Days     Types: Cigars    Smokeless tobacco: Never   Substance Use Topics    Alcohol use: Yes     Comment: occ    Drug use: Yes     Frequency: 3.0 times per week     Types: Marijuana

## 2025-06-09 RX ORDER — NORETHINDRONE 0.35 MG/1
1 TABLET ORAL DAILY
Qty: 28 TABLET | Refills: 11 | Status: SHIPPED | OUTPATIENT
Start: 2025-06-09

## 2025-06-12 DIAGNOSIS — T83.32XA INTRAUTERINE CONTRACEPTIVE DEVICE THREADS LOST, INITIAL ENCOUNTER: Primary | ICD-10-CM

## 2025-06-16 ENCOUNTER — HOSPITAL ENCOUNTER (EMERGENCY)
Facility: OTHER | Age: 40
Discharge: HOME | End: 2025-06-16
Attending: EMERGENCY MEDICINE
Payer: COMMERCIAL

## 2025-06-16 VITALS
WEIGHT: 250 LBS | HEART RATE: 70 BPM | RESPIRATION RATE: 17 BRPM | TEMPERATURE: 98 F | HEIGHT: 71 IN | BODY MASS INDEX: 35 KG/M2 | DIASTOLIC BLOOD PRESSURE: 90 MMHG | OXYGEN SATURATION: 100 % | SYSTOLIC BLOOD PRESSURE: 127 MMHG

## 2025-06-16 DIAGNOSIS — K92.0 HEMATEMESIS OF UNKNOWN CAUSE: ICD-10-CM

## 2025-06-16 DIAGNOSIS — R10.13 EPIGASTRIC PAIN: Primary | ICD-10-CM

## 2025-06-16 DIAGNOSIS — R11.0 NAUSEA: ICD-10-CM

## 2025-06-16 LAB
ABSOLUTE EOSINOPHIL (OHS): 0.11 K/UL
ABSOLUTE MONOCYTE (OHS): 0.48 K/UL (ref 0.3–1)
ABSOLUTE NEUTROPHIL COUNT (OHS): 2.21 K/UL (ref 1.8–7.7)
ALBUMIN SERPL BCP-MCNC: 4 G/DL (ref 3.5–5.2)
ALP SERPL-CCNC: 90 UNIT/L (ref 40–150)
ALT SERPL W/O P-5'-P-CCNC: 18 UNIT/L (ref 10–44)
ANION GAP (OHS): 11 MMOL/L (ref 8–16)
AST SERPL-CCNC: 16 UNIT/L (ref 11–45)
B-HCG UR QL: NEGATIVE
BACTERIA #/AREA URNS AUTO: ABNORMAL /HPF
BASOPHILS # BLD AUTO: 0.06 K/UL
BASOPHILS NFR BLD AUTO: 1.2 %
BILIRUB SERPL-MCNC: 0.8 MG/DL (ref 0.1–1)
BILIRUB UR QL STRIP.AUTO: NEGATIVE
BUN SERPL-MCNC: 8 MG/DL (ref 6–20)
CALCIUM SERPL-MCNC: 9.1 MG/DL (ref 8.7–10.5)
CHLORIDE SERPL-SCNC: 108 MMOL/L (ref 95–110)
CLARITY UR: ABNORMAL
CO2 SERPL-SCNC: 22 MMOL/L (ref 23–29)
COLOR UR AUTO: ABNORMAL
CREAT SERPL-MCNC: 0.8 MG/DL (ref 0.5–1.4)
CTP QC/QA: YES
ERYTHROCYTE [DISTWIDTH] IN BLOOD BY AUTOMATED COUNT: 17.9 % (ref 11.5–14.5)
GFR SERPLBLD CREATININE-BSD FMLA CKD-EPI: >60 ML/MIN/1.73/M2
GLUCOSE SERPL-MCNC: 88 MG/DL (ref 70–110)
GLUCOSE UR QL STRIP: NEGATIVE
HCT VFR BLD AUTO: 38 % (ref 37–48.5)
HGB BLD-MCNC: 12.2 GM/DL (ref 12–16)
HGB UR QL STRIP: ABNORMAL
HOLD SPECIMEN: NORMAL
HYALINE CASTS UR QL AUTO: 0 /LPF (ref 0–1)
IMM GRANULOCYTES # BLD AUTO: 0.03 K/UL (ref 0–0.04)
IMM GRANULOCYTES NFR BLD AUTO: 0.6 % (ref 0–0.5)
KETONES UR QL STRIP: NEGATIVE
LEUKOCYTE ESTERASE UR QL STRIP: ABNORMAL
LIPASE SERPL-CCNC: 9 U/L (ref 4–60)
LYMPHOCYTES # BLD AUTO: 2.19 K/UL (ref 1–4.8)
MCH RBC QN AUTO: 22.1 PG (ref 27–31)
MCHC RBC AUTO-ENTMCNC: 32.1 G/DL (ref 32–36)
MCV RBC AUTO: 69 FL (ref 82–98)
MICROSCOPIC COMMENT: ABNORMAL
NITRITE UR QL STRIP: NEGATIVE
NUCLEATED RBC (/100WBC) (OHS): 0 /100 WBC
PH UR STRIP: 7 [PH]
PLATELET # BLD AUTO: 358 K/UL (ref 150–450)
PMV BLD AUTO: 8.7 FL (ref 9.2–12.9)
POTASSIUM SERPL-SCNC: 3.9 MMOL/L (ref 3.5–5.1)
PROT SERPL-MCNC: 7.7 GM/DL (ref 6–8.4)
PROT UR QL STRIP: ABNORMAL
RBC # BLD AUTO: 5.51 M/UL (ref 4–5.4)
RBC #/AREA URNS AUTO: 79 /HPF (ref 0–4)
RELATIVE EOSINOPHIL (OHS): 2.2 %
RELATIVE LYMPHOCYTE (OHS): 43.1 % (ref 18–48)
RELATIVE MONOCYTE (OHS): 9.4 % (ref 4–15)
RELATIVE NEUTROPHIL (OHS): 43.5 % (ref 38–73)
SODIUM SERPL-SCNC: 141 MMOL/L (ref 136–145)
SP GR UR STRIP: 1.02
SQUAMOUS #/AREA URNS AUTO: 7 /HPF
UROBILINOGEN UR STRIP-ACNC: ABNORMAL EU/DL
WBC # BLD AUTO: 5.08 K/UL (ref 3.9–12.7)
WBC #/AREA URNS AUTO: 23 /HPF (ref 0–5)
YEAST UR QL AUTO: ABNORMAL /HPF

## 2025-06-16 PROCEDURE — 25000003 PHARM REV CODE 250: Performed by: EMERGENCY MEDICINE

## 2025-06-16 PROCEDURE — 99283 EMERGENCY DEPT VISIT LOW MDM: CPT

## 2025-06-16 PROCEDURE — 83690 ASSAY OF LIPASE: CPT | Performed by: EMERGENCY MEDICINE

## 2025-06-16 PROCEDURE — 80053 COMPREHEN METABOLIC PANEL: CPT | Performed by: EMERGENCY MEDICINE

## 2025-06-16 PROCEDURE — 87086 URINE CULTURE/COLONY COUNT: CPT

## 2025-06-16 PROCEDURE — 85025 COMPLETE CBC W/AUTO DIFF WBC: CPT | Performed by: EMERGENCY MEDICINE

## 2025-06-16 PROCEDURE — 81025 URINE PREGNANCY TEST: CPT

## 2025-06-16 PROCEDURE — 81001 URINALYSIS AUTO W/SCOPE: CPT

## 2025-06-16 RX ORDER — ACETAMINOPHEN 325 MG/1
650 TABLET ORAL
Status: COMPLETED | OUTPATIENT
Start: 2025-06-16 | End: 2025-06-16

## 2025-06-16 RX ADMIN — ACETAMINOPHEN 650 MG: 325 TABLET, FILM COATED ORAL at 06:06

## 2025-06-16 NOTE — ED PROVIDER NOTES
Emergency Department Encounter  Provider Note    Geri Mosqueda  [id number]  6/16/2025    Evaluation:    History Acquisition:     Chief Complaint   Patient presents with    Vomiting     Pt reports one episode of vomiting this AM with bright red blood present in emesis. Pt states that she is currently nauseas, also complaining of generalized weakness. Pt denies any abdominal pain       History of Present Illness:  Geri Mosqueda is a 39 y.o. female who has a past medical history of Allergy, Blood clotting tendency, DVT (deep venous thrombosis) (02/11/2014), Follicular adenoma of thyroid gland (02/06/2014), Keloid cicatrix, PCOS (polycystic ovarian syndrome), and PE (pulmonary embolism) (02/11/2014).    The patient presents to the ED due to abdominal pain, nausea/vomiting.   Patient reports symptoms started this morning.  She reports midepigastric pain associated with 1 episode of vomiting.  She described it as saliva with bright red blood, measuring about a teaspoon.  She denies any subsequent episodes of vomiting.  No fever.  No diarrhea, blood in stool, or melena.  She has a history of similar symptoms in the past.  She underwent EGD and colonoscopy and was diagnosed with H pylori. She was instructed to stop taking NSAIDs and to start taking Protonix.  Her symptoms resolved afterward and she has since stopped taking Protonix.  She does state she has a history of fibromyalgia and recently started taking NSAIDs again for pain.  She began having epigastric pain and nausea few days ago, and took 1 dose of Protonix.  She has a history of DVT/PE several years ago after surgery.  She was briefly on Coumadin/Lovenox but is not currently on any anticoagulation.    Additional historians utilized:  None    Prior medical records were reviewed:   PCP visit 04/17 for follow-up of dizziness and nausea  GI visit 03/18 for follow-up of chronic nausea, stomach issues, dry heaving for > 1 year.  EGD 2024 reviewed,  gastritis seen.  Biopsies were positive for H pylori    The patient's list of active medical history, family/social history, medications, and allergies as documented has been reviewed.     Past Medical History:   Diagnosis Date    Allergy     seasonal    Blood clotting tendency     DVT (deep venous thrombosis) 02/11/2014    after surgery    Follicular adenoma of thyroid gland 02/06/2014    Keloid cicatrix     PCOS (polycystic ovarian syndrome)     PE (pulmonary embolism) 02/11/2014    after surgery     Past Surgical History:   Procedure Laterality Date    ANKLE SURGERY Right     COLONOSCOPY N/A 07/30/2019    Procedure: COLONOSCOPY/Suprep;  Surgeon: Emre Carcamo MD;  Location: [facility] ENDO;  Service: Endoscopy;  Laterality: N/A;    COLONOSCOPY N/A 5/24/2024    Procedure: COLONOSCOPY;  Surgeon: Catalino Raphael MD;  Location: [facility] ENDO;  Service: Endoscopy;  Laterality: N/A;  5/23-precall complete-[place]    ESOPHAGOGASTRODUODENOSCOPY N/A 5/24/2024    Procedure: EGD (ESOPHAGOGASTRODUODENOSCOPY);  Surgeon: Catalino Raphael MD;  Location: [facility] ENDO;  Service: Endoscopy;  Laterality: N/A;    NASAL SEPTUM SURGERY  03/2019    Dr. Aceves    THYROID LOBECTOMY Left 02/06/2014    and isthmusectomy    TONSILLECTOMY       Family History   Problem Relation Name Age of Onset    Depression Mother      Diabetes Mother      Hypertension Mother      No Known Problems Father      Cancer Brother      Heart attack Maternal Grandmother      Hypertension Maternal Grandmother      Colon cancer Maternal Grandmother      Diabetes Paternal Grandmother      Hypertension Daughter      Heart disease Neg Hx      Anemia Neg Hx      Arrhythmia Neg Hx      Asthma Neg Hx      Clotting disorder Neg Hx      Fainting Neg Hx      Heart failure Neg Hx      Hyperlipidemia Neg Hx      Melanoma Neg Hx      Breast cancer Neg Hx      Ovarian cancer Neg Hx       Social History     Socioeconomic History    Marital status: Single   Occupational History      Employer: [employer]   Tobacco Use    Smoking status: Some Days     Types: Cigars    Smokeless tobacco: Never   Substance and Sexual Activity    Alcohol use: Yes     Comment: occ    Drug use: Yes     Frequency: 3.0 times per week     Types: Marijuana    Sexual activity: Not Currently     Partners: Male     Birth control/protection: I.U.D.     Comment: Mirena 2024   Social History Narrative    Living with her boyfriend. She is from [place]. Her mother and step-father live in [facility]. She is a  and teaches voice in elementary. She attended school at [employer] and graduated from [employer]. 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         : single, lives with a roommate. She is a  removal technician.      Social Drivers of Health     Financial Resource Strain: High Risk (3/18/2025)    Overall Financial Resource Strain (CARDIA)     Difficulty of Paying Living Expenses: Hard   Food Insecurity: Food Insecurity Present (3/18/2025)    Hunger Vital Sign     Worried About Running Out of Food in the Last Year: Sometimes true     Ran Out of Food in the Last Year: Sometimes true   Transportation Needs: Unmet Transportation Needs (3/18/2025)    PRAPARE - Transportation     Lack of Transportation (Medical): Yes     Lack of Transportation (Non-Medical): Yes   Physical Activity: Insufficiently Active (3/18/2025)    Exercise Vital Sign     Days of Exercise per Week: 6 days     Minutes of Exercise per Session: 20 min   Stress: Stress Concern Present (3/18/2025)    [employer] [place] - Occupational Stress Questionnaire     Feeling of Stress : To some extent   Housing Stability: High Risk (3/18/2025)    Housing Stability Vital Sign     Unable to Pay for Housing in the Last Year: Yes     Number of Times Moved in the Last Year: 0     Homeless in the Last Year: No       Medications:  Medication List with Changes/Refills   Current Medications    ALBUTEROL (VENTOLIN HFA) 90  MCG/ACTUATION INHALER    Inhale 2 puffs into the lungs every 6 (six) hours as needed for Wheezing or Shortness of Breath. Rescue    BUSPIRONE (BUSPAR) 7.5 MG TABLET    TAKE 1 TABLET(7.5 MG) BY MOUTH TWICE DAILY    DEXTROAMPHETAMINE-AMPHETAMINE 10 MG TAB    Take 1 tablet by mouth every morning.    DULOXETINE (CYMBALTA) 60 MG CAPSULE    Take 1 capsule (60 mg total) by mouth 2 (two) times daily.    MECLIZINE (ANTIVERT) 25 MG TABLET    Take 1 tablet (25 mg total) by mouth 3 (three) times daily.    NORETHINDRONE (MICRONOR) 0.35 MG TABLET    Take 1 tablet (0.35 mg total) by mouth once daily.    PANTOPRAZOLE (PROTONIX) 40 MG TABLET    TAKE 1 TABLET(40 MG) BY MOUTH DAILY    SUMATRIPTAN (IMITREX) 100 MG TABLET    Take half to one tablet (50 to 100 mg) at the onset of the headaches. May repeat the dose in 2 hours if headaches recur. Maximum daily dose: 200 mg       Allergies:  Review of patient's allergies indicates:   Allergen Reactions    Eggplant Anaphylaxis, Itching and Other (See Comments)    Oxycodone          Physical Exam:     Initial Vitals [06/16/25 1606]   BP Pulse Resp Temp SpO2   137/89 83 18 98.2 °F (36.8 °C) 97 %      MAP       --         Physical Exam    Nursing note and vitals reviewed.  Constitutional: She appears well-developed and well-nourished. She is not diaphoretic. No distress.   HENT:   Head: Normocephalic and atraumatic. Mouth/Throat: Oropharynx is clear and moist.   Eyes: EOM are normal. Pupils are equal, round, and reactive to light.   Neck: No tracheal deviation present.   Cardiovascular:  Normal rate, regular rhythm, normal heart sounds and intact distal pulses.           Pulmonary/Chest: Breath sounds normal. No stridor. No respiratory distress. She has no wheezes.   Abdominal: Abdomen is soft. Bowel sounds are normal. She exhibits no distension and no mass. There is abdominal tenderness (mild) in the epigastric area. There is no rebound and no guarding.   Musculoskeletal:         General: No  edema. Normal range of motion.     Neurological: She is alert and oriented to person, place, and time. She has normal strength. No cranial nerve deficit or sensory deficit.   Skin: Skin is warm and dry. Capillary refill takes less than 2 seconds. No pallor.   Psychiatric: She has a normal mood and affect. Her behavior is normal. Thought content normal.         Differential Diagnoses:   Based on available information and initial assessment, Differential Diagnosis includes, but is not limited to:  Lower GI bleed (AVM, colitis, colon cancer, polyp, internal/external hemorrhoid, IBS, rectal injury/foreign body, chronic diarrhea, anal fissure), upper GI bleed (PUD, perforated ulcer, esophageal variceal bleed), Crohns disease, ulcerative colitis, chronic diarrhea, dietary intake      ED Management:   Procedures    Orders Placed This Encounter    Urine culture    CBC auto differential    Comprehensive metabolic panel    CBC with Differential    Urinalysis, Reflex to Urine Culture Urine, Clean Catch    Lipase    GREY TOP URINE HOLD    Urinalysis Microscopic    Orthostatic blood pressure    POCT urine pregnancy    Type & Screen    IV Saline Lock (large bore)    acetaminophen tablet 650 mg          EKG:       Labs:     Labs Reviewed   COMPREHENSIVE METABOLIC PANEL - Abnormal       Result Value    Sodium 141      Potassium 3.9      Chloride 108      CO2 22 (*)     Glucose 88      BUN 8      Creatinine 0.8      Calcium 9.1      Protein Total 7.7      Albumin 4.0      Bilirubin Total 0.8      ALP 90      AST 16      ALT 18      Anion Gap 11      eGFR >60     CBC WITH DIFFERENTIAL - Abnormal    WBC 5.08      RBC 5.51 (*)     HGB 12.2      HCT 38.0      MCV 69 (*)     MCH 22.1 (*)     MCHC 32.1      RDW 17.9 (*)     Platelet Count 358      MPV 8.7 (*)     Nucleated RBC 0      Neut % 43.5      Lymph % 43.1      Mono % 9.4      Eos % 2.2      Basophil % 1.2      Imm Grans % 0.6 (*)     Neut # 2.21      Lymph # 2.19      Mono # 0.48       Eos # 0.11      Baso # 0.06      Imm Grans # 0.03     URINALYSIS, REFLEX TO URINE CULTURE - Abnormal    Color, UA Orange (*)     Appearance, UA Hazy (*)     pH, UA 7.0      Spec Grav UA 1.020      Protein, UA 1+ (*)     Glucose, UA Negative      Ketones, UA Negative      Bilirubin, UA Negative      Blood, UA 3+ (*)     Nitrites, UA Negative      Urobilinogen, UA 4.0-6.0 (*)     Leukocyte Esterase, UA 1+ (*)    URINALYSIS MICROSCOPIC - Abnormal    RBC, UA 79 (*)     WBC, UA 23 (*)     Bacteria, UA Rare      Yeast, UA Occasional (*)     Squamous Epithelial Cells, UA 7      Hyaline Casts, UA 0      Microscopic Comment       LIPASE - Normal    Lipase Level 9     CULTURE, URINE   CBC W/ AUTO DIFFERENTIAL    Narrative:     The following orders were created for panel order CBC auto differential.  Procedure                               Abnormality         Status                     ---------                               -----------         ------                     CBC with Differential[[id number]]       Abnormal            Final result                 Please view results for these tests on the individual orders.   GREY TOP URINE HOLD    Extra Tube Hold for add-ons.     POCT URINE PREGNANCY    POC Preg Test, Ur Negative       Acceptable Yes       Independent review of the labs ordered include:   See ED course    Imaging:     Imaging Results    None            Medications Given:     Medications   acetaminophen tablet 650 mg (650 mg Oral Given 6/16/25 1827)        Medical Decision Making:    Additional Consideration:   Additional testing considered during clinical course: labs/imaging considered including:  - CT considered but felt unnecessary given reassuring vitals and exam    Social determinants of health considered during development of treatment plan include: poor access to care    Current co-morbidities considered which impacted clinical decision making: obesity, prior DVT/PE, nausea,  dizziness    Case discussed with additional provider: none    ED Course as of 06/16/25 2009 Mon Jun 16, 2025 1753 SpO2: 97 % [SS]   1754 Resp: 18 [SS]   1754 Pulse: 83 [SS]   1754 Temp Source: Oral [SS]   1754 Temp: 98.2 °F (36.8 °C) [SS]   1754 MAP (mmHg): 109 [SS]   1754 BP: 137/89  Vitals reassuring, afebrile [SS]   1900 CBC auto differential(!)  Unremarkable, H/H stable. [SS]   1900 POCT urine pregnancy  Negative [SS]   1900 Comprehensive metabolic panel(!)  Unremarkable, LFTs normal [SS]   1924 Lipase  WNL [SS]   1925 Urinalysis, Reflex to Urine Culture Urine, Clean Catch(!) [SS]   1925 Urinalysis Microscopic(!)  Moderate WBC present, though only rare bacteria, multiple squamous cells, possibly consistent with contaminated sample, patient also currently on menstrual cycle.  Patient denies any urinary symptoms.  Will defer treatment and follow urine culture. [SS]   1957 On reassessment, patient is well-appearing.  Results discussed and patient reassured.  Will discharge with supportive care, recommend restarting Protonix and taking it as scheduled.  Recommend avoiding NSAIDs in the interim.  Given strict return precautions including worsening symptoms, melena, severe/worsening pain, uncontrollable vomiting, or any other concerns. [SS]      ED Course User Index  [SS] Manoj Tellez MD            Medical Decision Making  After complete evaluation, including thorough history and physical exam, the patient's symptoms are most consistent with benign cause of abdominal pain. There is no rebound/guarding or other peritoneal signs to suggest perforation or other emergent surgical process. There is no fever or leukocytosis to suggest acute bacterial infection. There is no significant focal abdominal tenderness to suggest cholecystitis, appendicitis, diverticulitis, or  source, and the patient's current symptoms and clinical presentation do not warrant other targeted diagnostics at this time. CT A/P is unlikely to  outweigh risks of contrast/radiation at this time. The patient was treated with supportive care and improved.    Problems Addressed:  Epigastric pain: acute illness or injury  Hematemesis of unknown cause: acute illness or injury  Nausea: acute illness or injury    Amount and/or Complexity of Data Reviewed  External Data Reviewed: notes.  Labs: ordered. Decision-making details documented in ED Course.    Risk  OTC drugs.  Prescription drug management.  Diagnosis or treatment significantly limited by social determinants of health.        [facility] MDM  MDCalc MDM Module  Jun 16 2025 8:09 PM [Manoj Tellez]  Data:  - Test/documents/historian: 3+ tests ordered  Problems: Epigastric pain  Additional encounter diagnoses: Epigastric pain, Nausea, Hematemesis of unknown cause  Risk: acetaminophen tablet (OTC drug management)      Clinical Impression:       ICD-10-CM ICD-9-CM   1. Epigastric pain  R10.13 789.06   2. Nausea  R11.0 787.02   3. Hematemesis of unknown cause  K92.0 578.0         Follow-up Information       Follow up With Specialties Details Why Contact Info    Monae Chapin MD Internal Medicine Schedule an appointment as soon as possible for a visit   [address] [place] [address]  [phone]      Catalino Raphael MD Gastroenterology Schedule an appointment as soon as possible for a visit   [address]  [facility] [address] [phone]               ED Disposition Condition    Discharge Stable              On re-evaluation, the patient's status has improved.  PCP/GI follow-up as soon as possible was recommended.    After taking into careful account the patient's history, physical exam findings, as well as empirical and objective data obtained throughout ED workup, I feel no emergent medical condition has been identified. No further evaluation or admission was felt to be required, and the patient is stable for discharge from the ED. The patient and any additional family  present were updated with test results, overall clinical impression, and recommended further plan of care, including discharge instructions as provided and outpatient follow-up for continued evaluation and management as needed. All questions were answered. The patient expressed understanding and agreed with current plan for discharge and follow-up plan of care. Strict ED return precautions were provided, including return/worsening of current symptoms, new symptoms, or any other concerns.       Manoj Tellez MD  06/16/25 2009

## 2025-06-17 NOTE — DISCHARGE INSTRUCTIONS
Your workup today including labs was reassuring.  Continue taking Protonix regularly as scheduled.  Avoid anti-inflammatories.  Follow-up with your PCP or GI physician as soon as possible.  Please return to the ER if you develop worsening symptoms, multiple episodes or large volume blood in vomit, black/tarry stool, severe/worsening pain, or any other new symptoms.      Thank you for choosing [facility]!     Our goal in the Emergency Department is to always provide outstanding medical care. You may receive a survey by mail or e-mail in the next week regarding your experience today. We would greatly appreciate you completing and returning the survey. Your feedback provides us with a way to recognize our staff who provide very good care, and it helps us learn how to improve when your experience was below our aspiration of excellence.      It is important to remember that some problems are difficult to diagnose and may not be found during your first visit. Be sure to follow up with your primary care doctor and review any labs/imaging that was performed during your visit with them. If you do not have a primary care doctor, you may contact the one listed on your discharge paperwork, or you may also call the [facility] Appointment Desk at [phone] to schedule an appointment.     All medications may potentially have side effects and it is impossible to predict which medications may give you side effects. If you feel that you are having a negative effect of any medication you should immediately stop taking them and seek medical attention.  Do not drive or make any important decisions for 24 hours if you have received any pain medications, sedatives or mood altering drugs during your ER visit.    We appreciate you trusting us with your medical care. We will be happy to take care of you for all of your future medical needs. You may return to the ER at any time for any new/concerning symptoms, worsening  condition, or failure to improve. We hope you feel better soon.     Sincerely,    Manoj Tellez Jr., MD  Board-Certified Emergency Medicine Physician  [facility]

## 2025-06-18 LAB — BACTERIA UR CULT: NORMAL

## 2025-06-23 ENCOUNTER — PATIENT MESSAGE (OUTPATIENT)
Dept: NEUROLOGY | Facility: CLINIC | Age: 40
End: 2025-06-23
Payer: COMMERCIAL

## 2025-06-24 ENCOUNTER — LAB VISIT (OUTPATIENT)
Dept: LAB | Facility: HOSPITAL | Age: 40
End: 2025-06-24
Payer: COMMERCIAL

## 2025-06-24 ENCOUNTER — OFFICE VISIT (OUTPATIENT)
Dept: RHEUMATOLOGY | Facility: CLINIC | Age: 40
End: 2025-06-24
Payer: COMMERCIAL

## 2025-06-24 VITALS
DIASTOLIC BLOOD PRESSURE: 81 MMHG | HEART RATE: 77 BPM | SYSTOLIC BLOOD PRESSURE: 125 MMHG | WEIGHT: 255.31 LBS | BODY MASS INDEX: 35.61 KG/M2

## 2025-06-24 DIAGNOSIS — M79.7 FIBROMYALGIA: ICD-10-CM

## 2025-06-24 DIAGNOSIS — M79.7 FIBROMYALGIA: Primary | ICD-10-CM

## 2025-06-24 LAB
CK SERPL-CCNC: 93 U/L (ref 20–180)
CRP SERPL-MCNC: 13 MG/L
ERYTHROCYTE [SEDIMENTATION RATE] IN BLOOD BY PHOTOMETRIC METHOD: 6 MM/HR

## 2025-06-24 PROCEDURE — 3079F DIAST BP 80-89 MM HG: CPT | Mod: CPTII,S$GLB,,

## 2025-06-24 PROCEDURE — 3008F BODY MASS INDEX DOCD: CPT | Mod: CPTII,S$GLB,,

## 2025-06-24 PROCEDURE — 99214 OFFICE O/P EST MOD 30 MIN: CPT | Mod: S$GLB,,,

## 2025-06-24 PROCEDURE — G2211 COMPLEX E/M VISIT ADD ON: HCPCS | Mod: S$GLB,,,

## 2025-06-24 PROCEDURE — 86140 C-REACTIVE PROTEIN: CPT

## 2025-06-24 PROCEDURE — 82550 ASSAY OF CK (CPK): CPT

## 2025-06-24 PROCEDURE — 1160F RVW MEDS BY RX/DR IN RCRD: CPT | Mod: CPTII,S$GLB,,

## 2025-06-24 PROCEDURE — 82085 ASSAY OF ALDOLASE: CPT

## 2025-06-24 PROCEDURE — 3074F SYST BP LT 130 MM HG: CPT | Mod: CPTII,S$GLB,,

## 2025-06-24 PROCEDURE — 36415 COLL VENOUS BLD VENIPUNCTURE: CPT

## 2025-06-24 PROCEDURE — 1159F MED LIST DOCD IN RCRD: CPT | Mod: CPTII,S$GLB,,

## 2025-06-24 PROCEDURE — 85652 RBC SED RATE AUTOMATED: CPT

## 2025-06-24 PROCEDURE — 99999 PR PBB SHADOW E&M-EST. PATIENT-LVL III: CPT | Mod: PBBFAC,,,

## 2025-06-24 RX ORDER — GABAPENTIN 100 MG/1
100 CAPSULE ORAL NIGHTLY
Qty: 30 CAPSULE | Refills: 3 | Status: SHIPPED | OUTPATIENT
Start: 2025-06-24 | End: 2025-10-22

## 2025-06-24 NOTE — PROGRESS NOTES
Subjective:      Patient ID: Geri Mosqueda is a 39 y.o. female.    Chief Complaint: Follow up on fibromyalgia    HPI  Geri Mosqueda is a 38yo female with a h/o DVT and PE (), LAYNE, scoliosis, anxiety, depression, and insomnia who presents for follow up on fibromyalgia.  Previously being followed by Dr. Hernández.    She has no known family h/o autoimmune disorder.  Dr. Hernández check blood work and imaging, and had ruled out autoimmune dx.     In the past she eliminated breads, meats, etc. Didn't see improvement with this.    Works at a  home; heavy lifting for the  at times make the pain worse.  Weakness in arms and legs at times.  Also a dolan.    Today:  Pt continues to have symptoms of AM stiffness, which worsens before bed as well.  Hurts inside her bones, dull ache all day every day.  Today in hips, L leg, hands. Mostly in joints  Very fatigued    Currently has a medical marijuana card, which she uses after work. Dulls the pain for her to be able to sleep.  She has no appetite bc of the pain, smokes so she is able to eat at least 1 meal a day.    Currently taking duloxetine from psych for depression.  She has not tried Lyrica or gabapentin in the past.    Review of Systems   Constitutional:  Positive for unexpected weight change. Negative for fever.   HENT:  Negative for mouth sores and trouble swallowing.    Eyes:  Negative for redness.   Respiratory:  Positive for shortness of breath. Negative for cough.    Cardiovascular:  Negative for chest pain.   Gastrointestinal:  Positive for diarrhea. Negative for constipation.   Genitourinary:  Negative for dysuria and genital sores.   Skin:  Negative for rash.   Neurological:  Positive for headaches.   Hematological:  Bruises/bleeds easily.      Widespread pain index  Note the areas which the patient has had pain over the last week:                 Shoulder-girdle, leftx               Shoulder-girdle, rightx                          Upper arm leftx                       Upper arm rightx                         Lower arm leftx                       Lower arm rightx    Hip (buttock, trochanter) left  Hip (buttock, trochanter) rightx                           Upper leg, leftx                         Upper leg, rightx                           Lower leg, leftx                         Lower leg, rightx                                     Jaw, left                                   Jaw, rightx                                        Chest                                  Abdomen                               Upper back                              Lower backx                                        Neck  Score will be from 0-19: score of 13    Symptom severity score:  For each of the 3 symptoms, indicate the level of severity over the past week using the Scale.  The symptom severity score is the sum of the severity of the 3 symptoms (fatigue, waking unrefreshed, and cognitive symptoms) plus the number of the following symptoms occurring during the previous 6 months:  0 = no problem, 1=slight or mild problem 2= moderate; considerable problems often present and/or at a moderate level, 3 = severe, pervasive, continuous, life disturbing problem    Fatigue: 3  Waking Unrefreshed: 3  Cognitive Symptoms: 2  Yes=1; No=0  Headaches: 1  Pain or cramps in the lower abdomen: 1  Depression: 1  The final score is between 0 and 12: score of 11    Criteria:  Patient has fibromyalgia if the following 3 conditions are met:  1.  Widespread pain index greater than or equal to 7 and symptom severity score greater than or equal to 5 or widespread pain index between 3- 6, and symptom severity score greater than or equal to 9.    2.  Symptoms have been present in a similar level for at least 3 months  3.  The patient does not have a disorder that would otherwise sufficiently explain the pain    This patient does meet criteria for Fibromyalgia.    Objective:   BP  125/81   Pulse 77   Wt 115.8 kg (255 lb 4.7 oz)   BMI 35.61 kg/m²   Physical Exam   Constitutional: She is oriented to person, place, and time. normal appearance.   Cardiovascular: Normal rate, regular rhythm, normal heart sounds and normal pulses.   Pulmonary/Chest: Effort normal and breath sounds normal.   Musculoskeletal:         General: Tenderness present. No swelling.      Cervical back: Normal range of motion and neck supple.   Neurological: She is alert and oriented to person, place, and time.   Skin: Skin is warm and dry.   Psychiatric: Her behavior is normal. Mood normal.       Assessment:     1. Fibromyalgia          Plan:     Problem List Items Addressed This Visit    None  Visit Diagnoses         Fibromyalgia    -  Primary    Relevant Medications    gabapentin (NEURONTIN) 100 MG capsule    Other Relevant Orders    Sedimentation rate (Completed)    C-Reactive Protein    CK    Aldolase          Elevated Widespread Pain Index score and Symptom severity score indicate her fibromyalgia is not well controlled.   No synovitis or MCP, PIP joint tenderness noted on exam today, ruling our inflammatory arthritis.  She is already on duloxetine 60mg BID for depression from her psychiatrist.  Will try low dose gabapentin at bedtime for now, may increase in the future if needed.  Started gabapentin 100mg at bedtime.   Recheck inflammation markers and CK, aldolase, with her symptoms of muscle weakness.    RTO 4mos

## 2025-06-24 NOTE — PROGRESS NOTES
6/23/2025     1:29 AM   Rapid3 Question Responses and Scores   MDHAQ Score 1.3   Psychologic Score 6.6   Pain Score 8.5   When you awakened in the morning OVER THE LAST WEEK, did you feel stiff? Yes   If Yes, please indicate the number of hours until you are as limber as you will be for the day 15   Fatigue Score 7   Global Health Score 10   RAPID3 Score 7.61        Answers submitted by the patient for this visit:  Rheumatology Questionnaire (Submitted on 6/23/2025)  fever: No  eye redness: No  mouth sores: No  headaches: Yes  shortness of breath: Yes  chest pain: No  trouble swallowing: No  diarrhea: Yes  constipation: No  unexpected weight change: Yes  genital sore: No  dysuria: No  During the last 3 days, have you had a skin rash?: No  Bruises or bleeds easily: Yes  cough: No

## 2025-06-25 ENCOUNTER — OFFICE VISIT (OUTPATIENT)
Dept: PRIMARY CARE CLINIC | Facility: CLINIC | Age: 40
End: 2025-06-25
Payer: COMMERCIAL

## 2025-06-25 ENCOUNTER — PATIENT MESSAGE (OUTPATIENT)
Dept: PRIMARY CARE CLINIC | Facility: CLINIC | Age: 40
End: 2025-06-25

## 2025-06-25 DIAGNOSIS — F41.9 ANXIETY: ICD-10-CM

## 2025-06-25 DIAGNOSIS — K92.0 HEMATEMESIS WITH NAUSEA: Primary | ICD-10-CM

## 2025-06-25 DIAGNOSIS — M79.7 FIBROMYALGIA: ICD-10-CM

## 2025-06-25 LAB — ALDOLASE (OHS): 3.2 U/L (ref 1.2–7.6)

## 2025-06-25 PROCEDURE — 98005 SYNCH AUDIO-VIDEO EST LOW 20: CPT | Mod: 95,,, | Performed by: STUDENT IN AN ORGANIZED HEALTH CARE EDUCATION/TRAINING PROGRAM

## 2025-06-25 PROCEDURE — 1159F MED LIST DOCD IN RCRD: CPT | Mod: CPTII,95,, | Performed by: STUDENT IN AN ORGANIZED HEALTH CARE EDUCATION/TRAINING PROGRAM

## 2025-06-25 RX ORDER — DULOXETIN HYDROCHLORIDE 30 MG/1
30 CAPSULE, DELAYED RELEASE ORAL DAILY
Qty: 14 CAPSULE | Refills: 0 | Status: SHIPPED | OUTPATIENT
Start: 2025-06-25 | End: 2025-06-26 | Stop reason: ALTCHOICE

## 2025-06-25 NOTE — PROGRESS NOTES
Telehealth Visit    The patient location is: [place]   The chief complaint leading to consultation is: Follow up     Visit type: audiovisual    Face to Face time with patient: 10 minutes  25 minutes of total time spent on the encounter, which includes face to face time and non-face to face time preparing to see the patient (eg, review of tests), Obtaining and/or reviewing separately obtained history, Documenting clinical information in the electronic or other health record, Independently interpreting results (not separately reported) and communicating results to the patient/family/caregiver, or Care coordination (not separately reported).       HPI    Patient is a 39 y.o.   Geri Garcia Pleasant  has a past medical history of Allergy, Blood clotting tendency, DVT (deep venous thrombosis) (02/11/2014), Follicular adenoma of thyroid gland (02/06/2014), Keloid cicatrix, PCOS (polycystic ovarian syndrome), and PE (pulmonary embolism) (02/11/2014).       History of Present Illness    CHIEF COMPLAINT:  Patient presents today for follow up after ER visit for hematemesis.    HISTORY OF PRESENT ILLNESS:  She reports hematemesis of less than a teaspoon of blood last Monday morning. She woke up extremely nauseated that morning with nausea persisting throughout the day without further vomiting. Prior to the episode, she consumed a pre-made cocktail and Excedrin over the weekend.     CURRENT MEDICATIONS:  She recently started gabapentin for fibromyalgia management with first dose taken yesterday. She continues Cymbalta 60mg twice daily for the past three years for anxiety and pain.  Rheumatology NP advised about potential medication interactions with Savella and Cymbalta.          Active Medications:  Current Outpatient Medications   Medication Instructions    albuterol (VENTOLIN HFA) 90 mcg/actuation inhaler 2 puffs, Inhalation, Every 6 hours PRN, Rescue    busPIRone (BUSPAR) 7.5 mg, Oral, 2 times daily     dextroamphetamine-amphetamine 10 mg Tab 1 tablet, Every morning    DULoxetine (CYMBALTA) 30 mg, Oral, Daily    gabapentin (NEURONTIN) 100 mg, Oral, Nightly    meclizine (ANTIVERT) 25 mg, Oral, 3 times daily    norethindrone (MICRONOR) 0.35 mg, Oral, Daily    pantoprazole (PROTONIX) 40 mg, Oral    sumatriptan (IMITREX) 100 MG tablet Take half to one tablet (50 to 100 mg) at the onset of the headaches. May repeat the dose in 2 hours if headaches recur. Maximum daily dose: 200 mg       Physical Exam    General: Does not appear to be in acute distress    EGD 5/2024  -Normal esophagus.   -Gastritis. Biopsied.   -Normal examined duodenum. Biopsied.         Assessment & Plan      HEMATEMESIS:   Alcohol use, NSAID use, and gastritis likely contributed.  Suspect Betty-De Leon tear.  Follow up with GI    FIBROMYALGIA:   Plan to start Savella.  Per rheumatology use with Cymbalta is contraindicated.  Recommended slow taper of Cymbalta.  Follow up in one month.     ANXIETY:  Explained necessity for gradual Cymbalta tapering to prevent withdrawal symptoms.  Instructed the patient to contact office if experiencing any adverse effects during the tapering process.           Orders Placed This Encounter    DULoxetine (CYMBALTA) 30 MG capsule         Upcoming Scheduled Appointments and Follow Up:    Future Appointments   Date Time Provider Department Center   6/26/2025  2:00 PM Hiram Linton MD [facility]   8/14/2025  1:40 PM Shannan Brito MD [facility]   10/23/2025 11:00 AM Jyotsna Walsh FNP[facility]       Follow Up [facility] (with who? when?): No follow-ups on file.        Extended Emergency Contact Information  Primary Emergency Contact: Annia Mcneill  Address: [address] [place] [address], [place] [address] [place]  Mobile Phone: [phone]  Relation: Mother  Secondary Emergency Contact: Latrice Lerma  Address: [address] [place] [address], [place] [address] [place]  Mobile Phone: [phone]  Relation: Roommate      Monae Chapin MD   Internal Medicine  6/25/2025 - 4:22 PM        Each patient to whom he or she provides medical services by telemedicine is:  (1) informed of the relationship between the physician and patient and the respective role of any other health care provider with respect to management of the patient; and (2) notified that he or she may decline to receive medical services by telemedicine and may withdraw from such care at any time.    This note was generated with the assistance of ambient listening technology. Verbal consent was obtained by the patient and accompanying visitor(s) for the recording of patient appointment to facilitate this note. I attest to having reviewed and edited the generated note for accuracy, though some syntax or spelling errors may persist. Please contact the author of this note for any clarification.      While patients have the right to access their medical record, it is essential to recognize that progress notes primarily serve as a means of communication among healthcare professionals.

## 2025-06-26 ENCOUNTER — PATIENT MESSAGE (OUTPATIENT)
Dept: RHEUMATOLOGY | Facility: CLINIC | Age: 40
End: 2025-06-26
Payer: COMMERCIAL

## 2025-06-26 ENCOUNTER — PATIENT MESSAGE (OUTPATIENT)
Dept: OBSTETRICS AND GYNECOLOGY | Facility: CLINIC | Age: 40
End: 2025-06-26
Payer: COMMERCIAL

## 2025-06-26 DIAGNOSIS — M79.7 FIBROMYALGIA: Primary | ICD-10-CM

## 2025-07-09 ENCOUNTER — HOSPITAL ENCOUNTER (OUTPATIENT)
Dept: RADIOLOGY | Facility: HOSPITAL | Age: 40
Discharge: HOME | End: 2025-07-09
Attending: STUDENT IN AN ORGANIZED HEALTH CARE EDUCATION/TRAINING PROGRAM
Payer: COMMERCIAL

## 2025-07-09 ENCOUNTER — PROCEDURE VISIT (OUTPATIENT)
Dept: OBSTETRICS AND GYNECOLOGY | Facility: CLINIC | Age: 40
End: 2025-07-09
Payer: COMMERCIAL

## 2025-07-09 VITALS — HEIGHT: 71 IN | BODY MASS INDEX: 35.61 KG/M2 | DIASTOLIC BLOOD PRESSURE: 80 MMHG | SYSTOLIC BLOOD PRESSURE: 124 MMHG

## 2025-07-09 DIAGNOSIS — T83.32XD INTRAUTERINE CONTRACEPTIVE DEVICE THREADS LOST, SUBSEQUENT ENCOUNTER: ICD-10-CM

## 2025-07-09 DIAGNOSIS — T83.32XD INTRAUTERINE CONTRACEPTIVE DEVICE THREADS LOST, SUBSEQUENT ENCOUNTER: Primary | ICD-10-CM

## 2025-07-09 PROCEDURE — 74018 RADEX ABDOMEN 1 VIEW: CPT | Mod: 26,,, | Performed by: RADIOLOGY

## 2025-07-09 PROCEDURE — 99213 OFFICE O/P EST LOW 20 MIN: CPT | Mod: S$GLB,,, | Performed by: STUDENT IN AN ORGANIZED HEALTH CARE EDUCATION/TRAINING PROGRAM

## 2025-07-09 PROCEDURE — 74018 RADEX ABDOMEN 1 VIEW: CPT | Mod: TC,FY

## 2025-07-09 NOTE — PROGRESS NOTES
Subjective     Patient ID: Geri Mosqueda is a 40 y.o. female.    Chief Complaint:  Contraception      History of Present Illness  39 yo referred for possible IUD replacement    Pt had mirena placed for management of AUB, not currently SA.   Had imaging done recently which showed a few fibroids but no IUD visualized. KUB not done yet either  History of VTE on birth control, cannot take Lysteda/any estrogen containing BC. Was prescribed POP by [facility] but did not start yet        GYN & OB History  No LMP recorded. Patient has had an implant.   Date of Last Pap: 5/15/2025    OB History    Para Term  AB Living   0 0 0 0 0 0   SAB IAB Ectopic Multiple Live Births   0 0 0 0 0       Review of Systems  Review of Systems   All other systems reviewed and are negative.         Objective   Physical Exam:   Constitutional: She is oriented to person, place, and time. She appears well-developed and well-nourished.    HENT:   Head: Normocephalic and atraumatic.    Eyes: Conjunctivae and EOM are normal.      Pulmonary/Chest: Effort normal.                  Musculoskeletal: Normal range of motion.       Neurological: She is alert and oriented to person, place, and time.    Skin: Skin is warm and dry.    Psychiatric: She has a normal mood and affect.            Assessment and Plan     1. Intrauterine contraceptive device threads lost, subsequent encounter        Plan:  1. Intrauterine contraceptive device threads lost, subsequent encounter (Primary)  - discussed options, does not appear that IUD in uterus on imaging reviewed with patient. Pt would prefer to have KUB obtained to rule out intra abdominal device prior to proceeding with insertion  - discussed relative safety of POP in setting of prior provoked PE. Pt is not on anticoagulation anymore. Will consider taking for cycle management  - X-Ray Abdomen AP 1 View; Future     Follow up after Xray. Will plan for IUD replacement if not seen on KUB      Casey  Thanh PINZON MD  [facility] - [place], [place] - OB GYN   [address]  [facility] [address] [phone]

## 2025-07-11 ENCOUNTER — PATIENT MESSAGE (OUTPATIENT)
Dept: PRIMARY CARE CLINIC | Facility: CLINIC | Age: 40
End: 2025-07-11
Payer: COMMERCIAL

## 2025-07-11 DIAGNOSIS — R11.0 NAUSEA: ICD-10-CM

## 2025-07-11 DIAGNOSIS — R42 DIZZINESS: ICD-10-CM

## 2025-07-11 RX ORDER — DULOXETIN HYDROCHLORIDE 30 MG/1
30 CAPSULE, DELAYED RELEASE ORAL DAILY
COMMUNITY
End: 2025-07-11 | Stop reason: SDUPTHER

## 2025-07-11 RX ORDER — DULOXETIN HYDROCHLORIDE 30 MG/1
30 CAPSULE, DELAYED RELEASE ORAL DAILY
Qty: 14 CAPSULE | Refills: 0 | Status: SHIPPED | OUTPATIENT
Start: 2025-07-11 | End: 2025-07-25

## 2025-07-11 RX ORDER — MECLIZINE HYDROCHLORIDE 25 MG/1
25 TABLET ORAL 3 TIMES DAILY
Qty: 30 TABLET | Refills: 0 | Status: SHIPPED | OUTPATIENT
Start: 2025-07-11

## 2025-07-11 NOTE — TELEPHONE ENCOUNTER
No care due was identified.  [facility] Embedded Care Due Messages. Reference number: [id number].   7/11/2025 12:34:42 PM CDT

## 2025-07-14 ENCOUNTER — TELEPHONE (OUTPATIENT)
Dept: OBSTETRICS AND GYNECOLOGY | Facility: CLINIC | Age: 40
End: 2025-07-14
Payer: COMMERCIAL

## 2025-07-14 NOTE — TELEPHONE ENCOUNTER
Left vm asking the pt to contact the office back regarding her appt.   ----- Message from Casey Law MD sent at 7/13/2025  2:15 PM CDT -----  Please call patient and see if she wants to move her follow up to earlier in the week of the 21st  ----- Message -----  From: Domingo, Rad Results In  Sent: 7/11/2025   9:04 AM CDT  To: Casey Law III, MD

## 2025-07-21 ENCOUNTER — PATIENT MESSAGE (OUTPATIENT)
Dept: NEUROLOGY | Facility: CLINIC | Age: 40
End: 2025-07-21
Payer: COMMERCIAL

## 2025-07-21 DIAGNOSIS — H81.4 VERTIGO OF CENTRAL ORIGIN: ICD-10-CM

## 2025-07-21 DIAGNOSIS — H93.A9 PULSATILE TINNITUS, UNSPECIFIED EAR: Primary | ICD-10-CM

## 2025-07-28 ENCOUNTER — PATIENT MESSAGE (OUTPATIENT)
Dept: RHEUMATOLOGY | Facility: CLINIC | Age: 40
End: 2025-07-28
Payer: COMMERCIAL

## 2025-07-30 ENCOUNTER — PATIENT MESSAGE (OUTPATIENT)
Dept: OBSTETRICS AND GYNECOLOGY | Facility: CLINIC | Age: 40
End: 2025-07-30
Payer: COMMERCIAL

## 2025-07-30 ENCOUNTER — TELEPHONE (OUTPATIENT)
Dept: PHARMACY | Facility: CLINIC | Age: 40
End: 2025-07-30
Payer: COMMERCIAL

## 2025-07-30 DIAGNOSIS — T83.32XD MALPOSITIONED IUD, SUBSEQUENT ENCOUNTER: Primary | ICD-10-CM

## 2025-07-30 RX ORDER — MISOPROSTOL 200 UG/1
200 TABLET ORAL ONCE
Qty: 1 TABLET | Refills: 0 | Status: SHIPPED | OUTPATIENT
Start: 2025-07-30 | End: 2025-07-30

## 2025-07-30 NOTE — TELEPHONE ENCOUNTER
I have confirmed with Geri Mosqueda that she does not need prescription assistance at this time. Patient stated medication interacts with other medication.    Maria Little  Pharmacy Patient Assistance Team

## 2025-07-31 ENCOUNTER — PATIENT MESSAGE (OUTPATIENT)
Dept: PRIMARY CARE CLINIC | Facility: CLINIC | Age: 40
End: 2025-07-31
Payer: COMMERCIAL

## 2025-08-01 ENCOUNTER — PROCEDURE VISIT (OUTPATIENT)
Dept: OBSTETRICS AND GYNECOLOGY | Facility: CLINIC | Age: 40
End: 2025-08-01
Payer: COMMERCIAL

## 2025-08-01 VITALS
WEIGHT: 248.69 LBS | DIASTOLIC BLOOD PRESSURE: 80 MMHG | SYSTOLIC BLOOD PRESSURE: 135 MMHG | BODY MASS INDEX: 34.81 KG/M2 | HEIGHT: 71 IN

## 2025-08-01 DIAGNOSIS — Z30.432 ENCOUNTER FOR IUD REMOVAL: Primary | ICD-10-CM

## 2025-08-01 NOTE — PROCEDURES
Removal of IUD    Date/Time: 8/1/2025 1:00 PM    Performed by: Casey Law III, MD  Authorized by: Casey Law III, MD    Consent obtained:  Prior to procedure the appropriate consent was completed and verified  Consent given by:  Patient  Procedure risks and benefits discussed: yes    Patient questions answered: yes    Patient agrees, verbalizes understanding, and wants to proceed: yes    Instructions and paperwork completed: yes    Patient states understanding of procedure being performed: yes    Relevant documents present and verified: yes    Imaging studies available: yes    Required blood products, implants, devices, and special equipment available: yes    Implant grasped by: ring forceps  Removal due to infection and inflammatory reaction: no    Removal due to mechanical complications: yes    Removed with no complications: yes     Counseled pt on return to fertility with IUD removed and likely change in bleeding profile, verbalized understanding will follow up as needed

## 2025-08-04 ENCOUNTER — HOSPITAL ENCOUNTER (EMERGENCY)
Facility: OTHER | Age: 40
Discharge: HOME | End: 2025-08-04
Attending: EMERGENCY MEDICINE
Payer: COMMERCIAL

## 2025-08-04 VITALS
WEIGHT: 260 LBS | HEART RATE: 80 BPM | HEIGHT: 71 IN | RESPIRATION RATE: 18 BRPM | OXYGEN SATURATION: 100 % | SYSTOLIC BLOOD PRESSURE: 122 MMHG | DIASTOLIC BLOOD PRESSURE: 64 MMHG | TEMPERATURE: 98 F | BODY MASS INDEX: 36.4 KG/M2

## 2025-08-04 DIAGNOSIS — R07.9 CHEST PAIN: ICD-10-CM

## 2025-08-04 DIAGNOSIS — N93.9 VAGINAL BLEEDING: Primary | ICD-10-CM

## 2025-08-04 DIAGNOSIS — R10.2 PELVIC CRAMPING: ICD-10-CM

## 2025-08-04 LAB
ABSOLUTE EOSINOPHIL (OHS): 0.08 K/UL
ABSOLUTE MONOCYTE (OHS): 1.17 K/UL (ref 0.3–1)
ABSOLUTE NEUTROPHIL COUNT (OHS): 5.77 K/UL (ref 1.8–7.7)
ALBUMIN SERPL BCP-MCNC: 4.4 G/DL (ref 3.5–5.2)
ALP SERPL-CCNC: 88 UNIT/L (ref 40–150)
ALT SERPL W/O P-5'-P-CCNC: 11 UNIT/L (ref 10–44)
ANION GAP (OHS): 10 MMOL/L (ref 8–16)
AST SERPL-CCNC: 17 UNIT/L (ref 11–45)
B-HCG UR QL: NEGATIVE
BACTERIA #/AREA URNS AUTO: ABNORMAL /HPF
BASOPHILS # BLD AUTO: 0.07 K/UL
BASOPHILS NFR BLD AUTO: 0.7 %
BILIRUB SERPL-MCNC: 1.1 MG/DL (ref 0.1–1)
BILIRUB UR QL STRIP.AUTO: NEGATIVE
BUN SERPL-MCNC: 10 MG/DL (ref 6–20)
CALCIUM SERPL-MCNC: 9.8 MG/DL (ref 8.7–10.5)
CHLORIDE SERPL-SCNC: 105 MMOL/L (ref 95–110)
CLARITY UR: ABNORMAL
CO2 SERPL-SCNC: 23 MMOL/L (ref 23–29)
COLOR UR AUTO: ABNORMAL
CREAT SERPL-MCNC: 0.9 MG/DL (ref 0.5–1.4)
CTP QC/QA: YES
ERYTHROCYTE [DISTWIDTH] IN BLOOD BY AUTOMATED COUNT: 18.6 % (ref 11.5–14.5)
GFR SERPLBLD CREATININE-BSD FMLA CKD-EPI: >60 ML/MIN/1.73/M2
GLUCOSE SERPL-MCNC: 92 MG/DL (ref 70–110)
GLUCOSE UR QL STRIP: NEGATIVE
HCT VFR BLD AUTO: 38.1 % (ref 37–48.5)
HGB BLD-MCNC: 12.4 GM/DL (ref 12–16)
HGB UR QL STRIP: ABNORMAL
HYALINE CASTS UR QL AUTO: 8 /LPF (ref 0–1)
IMM GRANULOCYTES # BLD AUTO: 0.05 K/UL (ref 0–0.04)
IMM GRANULOCYTES NFR BLD AUTO: 0.5 % (ref 0–0.5)
KETONES UR QL STRIP: ABNORMAL
LEUKOCYTE ESTERASE UR QL STRIP: ABNORMAL
LYMPHOCYTES # BLD AUTO: 3.16 K/UL (ref 1–4.8)
MCH RBC QN AUTO: 21.9 PG (ref 27–31)
MCHC RBC AUTO-ENTMCNC: 32.5 G/DL (ref 32–36)
MCV RBC AUTO: 67 FL (ref 82–98)
MICROSCOPIC COMMENT: ABNORMAL
NITRITE UR QL STRIP: NEGATIVE
NUCLEATED RBC (/100WBC) (OHS): 0 /100 WBC
PH UR STRIP: 6 [PH]
PLATELET # BLD AUTO: 406 K/UL (ref 150–450)
PMV BLD AUTO: 9.3 FL (ref 9.2–12.9)
POC MOLECULAR INFLUENZA A AGN: NEGATIVE
POC MOLECULAR INFLUENZA B AGN: NEGATIVE
POTASSIUM SERPL-SCNC: 3.1 MMOL/L (ref 3.5–5.1)
PROT SERPL-MCNC: 7.7 GM/DL (ref 6–8.4)
PROT UR QL STRIP: ABNORMAL
RBC # BLD AUTO: 5.67 M/UL (ref 4–5.4)
RBC #/AREA URNS AUTO: >100 /HPF (ref 0–4)
RELATIVE EOSINOPHIL (OHS): 0.8 %
RELATIVE LYMPHOCYTE (OHS): 30.7 % (ref 18–48)
RELATIVE MONOCYTE (OHS): 11.4 % (ref 4–15)
RELATIVE NEUTROPHIL (OHS): 55.9 % (ref 38–73)
SARS-COV-2 RDRP RESP QL NAA+PROBE: NEGATIVE
SODIUM SERPL-SCNC: 138 MMOL/L (ref 136–145)
SP GR UR STRIP: 1.01
UROBILINOGEN UR STRIP-ACNC: NEGATIVE EU/DL
WBC # BLD AUTO: 10.3 K/UL (ref 3.9–12.7)
WBC #/AREA URNS AUTO: 2 /HPF (ref 0–5)

## 2025-08-04 PROCEDURE — 93005 ELECTROCARDIOGRAM TRACING: CPT

## 2025-08-04 PROCEDURE — 87635 SARS-COV-2 COVID-19 AMP PRB: CPT

## 2025-08-04 PROCEDURE — 93010 ELECTROCARDIOGRAM REPORT: CPT | Mod: ,,, | Performed by: INTERNAL MEDICINE

## 2025-08-04 PROCEDURE — 81003 URINALYSIS AUTO W/O SCOPE: CPT

## 2025-08-04 PROCEDURE — 25000003 PHARM REV CODE 250

## 2025-08-04 PROCEDURE — 82565 ASSAY OF CREATININE: CPT

## 2025-08-04 PROCEDURE — 81025 URINE PREGNANCY TEST: CPT

## 2025-08-04 PROCEDURE — 99284 EMERGENCY DEPT VISIT MOD MDM: CPT | Mod: 25

## 2025-08-04 PROCEDURE — 85025 COMPLETE CBC W/AUTO DIFF WBC: CPT

## 2025-08-04 RX ORDER — LIDOCAINE 50 MG/G
1 PATCH TOPICAL
Status: DISCONTINUED | OUTPATIENT
Start: 2025-08-04 | End: 2025-08-04 | Stop reason: HOSPADM

## 2025-08-04 RX ORDER — POTASSIUM CHLORIDE 20 MEQ/1
40 TABLET, EXTENDED RELEASE ORAL
Status: COMPLETED | OUTPATIENT
Start: 2025-08-04 | End: 2025-08-04

## 2025-08-04 RX ADMIN — LIDOCAINE 1 PATCH: 50 PATCH CUTANEOUS at 02:08

## 2025-08-04 RX ADMIN — POTASSIUM CHLORIDE 40 MEQ: 1500 TABLET, EXTENDED RELEASE ORAL at 03:08

## 2025-08-04 NOTE — ED NOTES
Instructed patient to remove lidocaine patch applied to lumbar area by 3am. That it can only stay in place for 12 hours

## 2025-08-04 NOTE — ED PROVIDER NOTES
Encounter Date: 8/4/2025       History     Chief Complaint   Patient presents with    Vaginal Bleeding     Had IUD removed on Friday. Heavy vaginal bleeding. Vaginal cramping. Weakness. Bodyaches. Hx of dvt and anemia     40-year-old female with history of PCOS, fibromyalgia, provoked PE and DVT not currently on blood thinners presents for increased vaginal bleeding +clots and pelvic cramping since getting her IUD removed 3 days ago on Friday. Denies bleeding through multiple pads/hour. Patient reports associated lightheadedness and chills.  Denies N/V/D.  Denies pain or burning with urination.  States she got her IUD removed because she is no longer sexually active. Has taken tylenol with some improvement of her cramping. Does also mention that she has bilateral upper back/side pain intermittently that has been going on for 20 years, worse with taking in a deep breath.     The history is provided by the patient.     Review of patient's allergies indicates:   Allergen Reactions    Eggplant Anaphylaxis, Itching and Other (See Comments)    Oxycodone      Past Medical History:   Diagnosis Date    Allergy     seasonal    Blood clotting tendency     DVT (deep venous thrombosis) 02/11/2014    after surgery    Follicular adenoma of thyroid gland 02/06/2014    Keloid cicatrix     PCOS (polycystic ovarian syndrome)     PE (pulmonary embolism) 02/11/2014    after surgery     Past Surgical History:   Procedure Laterality Date    ANKLE SURGERY Right     COLONOSCOPY N/A 07/30/2019    Procedure: COLONOSCOPY/Suprep;  Surgeon: Emre Carcamo MD;  Location: [facility];  Service: Endoscopy;  Laterality: N/A;    COLONOSCOPY N/A 5/24/2024    Procedure: COLONOSCOPY;  Surgeon: Catalino Raphael MD;  Location: [facility];  Service: Endoscopy;  Laterality: N/A;  5/23-precall complete-MS    ESOPHAGOGASTRODUODENOSCOPY N/A 5/24/2024    Procedure: EGD (ESOPHAGOGASTRODUODENOSCOPY);  Surgeon: Catalino Raphael MD;  Location: [facility];  Service:  Endoscopy;  Laterality: N/A;    NASAL SEPTUM SURGERY  03/2019    Dr. Aceves    THYROID LOBECTOMY Left 02/06/2014    and isthmusectomy    TONSILLECTOMY       Family History   Problem Relation Name Age of Onset    Depression Mother      Diabetes Mother      Hypertension Mother      No Known Problems Father      Cancer Brother      Heart attack Maternal Grandmother      Hypertension Maternal Grandmother      Colon cancer Maternal Grandmother      Diabetes Paternal Grandmother      Hypertension Daughter      Heart disease Neg Hx      Anemia Neg Hx      Arrhythmia Neg Hx      Asthma Neg Hx      Clotting disorder Neg Hx      Fainting Neg Hx      Heart failure Neg Hx      Hyperlipidemia Neg Hx      Melanoma Neg Hx      Breast cancer Neg Hx      Ovarian cancer Neg Hx       Social History[1]  Review of Systems  Per hpi  Physical Exam     Initial Vitals [08/04/25 1256]   BP Pulse Resp Temp SpO2   (!) 144/67 (!) 117 19 98.4 °F (36.9 °C) 100 %      MAP       --         Physical Exam    Nursing note and vitals reviewed.  Constitutional: Vital signs are normal. She appears well-developed and well-nourished. She is cooperative.   HENT:   Head: Normocephalic and atraumatic.   Eyes: Conjunctivae and lids are normal.   Neck: Trachea normal. No thyroid mass present.   Cardiovascular:  Regular rhythm.   Tachycardia present.         Pulmonary/Chest: No respiratory distress. She has no wheezes.   Abdominal: Abdomen is soft. There is no abdominal tenderness.   Musculoskeletal:         General: No edema.     Neurological: She is alert and oriented to person, place, and time. GCS score is 15. GCS eye subscore is 4. GCS verbal subscore is 5. GCS motor subscore is 6.   Skin: Skin is warm, dry and intact. No rash noted.   Psychiatric: She has a normal mood and affect. Her speech is normal and behavior is normal. Thought content normal.         ED Course   Procedures  Labs Reviewed   COMPREHENSIVE METABOLIC PANEL - Abnormal       Result Value     Sodium 138      Potassium 3.1 (*)     Chloride 105      CO2 23      Glucose 92      BUN 10      Creatinine 0.9      Calcium 9.8      Protein Total 7.7      Albumin 4.4      Bilirubin Total 1.1 (*)     ALP 88      AST 17      ALT 11      Anion Gap 10      eGFR >60     CBC WITH DIFFERENTIAL - Abnormal    WBC 10.30      RBC 5.67 (*)     HGB 12.4      HCT 38.1      MCV 67 (*)     MCH 21.9 (*)     MCHC 32.5      RDW 18.6 (*)     Platelet Count 406      MPV 9.3      Nucleated RBC 0      Neut % 55.9      Lymph % 30.7      Mono % 11.4      Eos % 0.8      Basophil % 0.7      Imm Grans % 0.5      Neut # 5.77      Lymph # 3.16      Mono # 1.17 (*)     Eos # 0.08      Baso # 0.07      Imm Grans # 0.05 (*)    URINALYSIS, REFLEX TO URINE CULTURE - Abnormal    Color, UA Brown (*)     Appearance, UA Hazy (*)     pH, UA 6.0      Spec Grav UA 1.010      Protein, UA 1+ (*)     Glucose, UA Negative      Ketones, UA Trace (*)     Bilirubin, UA Negative      Blood, UA 3+ (*)     Nitrites, UA Negative      Urobilinogen, UA Negative      Leukocyte Esterase, UA Trace (*)    URINALYSIS MICROSCOPIC - Abnormal    RBC, UA >100 (*)     WBC, UA 2      Bacteria, UA None      Hyaline Casts, UA 8 (*)     Microscopic Comment       CBC W/ AUTO DIFFERENTIAL    Narrative:     The following orders were created for panel order CBC auto differential.  Procedure                               Abnormality         Status                     ---------                               -----------         ------                     CBC with Differential[[id number]]       Abnormal            Final result                 Please view results for these tests on the individual orders.   GREY TOP URINE HOLD   SARS-COV-2 RDRP GENE    POC Rapid COVID Negative       Acceptable Yes     POCT INFLUENZA A/B MOLECULAR    POC Molecular Influenza A Ag Negative      POC Molecular Influenza B Ag Negative       Acceptable Yes     POCT URINE PREGNANCY     POC Preg Test, Ur Negative       Acceptable Yes          ECG Results              EKG 12-lead (Preliminary result)  Result time 08/04/25 16:01:19      Wet Read by Eber Powell MD (08/04/25 16:01:19, [facility], Emergency Medicine)    My EKG interpretation, sinus rhythm, 80 beats per minute, normal axis, no ST segment changes, incomplete right bundle, when compared to previous EKG December 13, 2022 incomplete bundle is new                                  Imaging Results    None          Medications   LIDOcaine 5 % patch 1 patch (1 patch Transdermal Patch Applied 8/4/25 1431)   potassium chloride SA CR tablet 40 mEq (40 mEq Oral Given 8/4/25 1548)     Medical Decision Making  This is an evaluation of a well-appearing afebrile 40-year-old female for increased vaginal bleeding after getting her IUD removed 3 days ago.  Vital signs are stable, mild tachy in low 90s initially.  We will obtain basic labs and reassess. No wt ct. Stable h/h. Low K 3.1. Will give potassium supplementation. There was a moment when pt noted some chest pain and difficulty breathing which then proceeded to pass. EKG obtained which did not reveal signs of ischemia. Pt admits to frequently experiencing intermittent chest pains/bilateral side/upper back pains for the last 20years. Offered a more thorough chest pain workup today but pt declines. States this is chronic for her and workups have always been negative in the past. I have low suspicion for ACS/PE. More likely elements of anxiety/her chronic pain. Stable spO2 100%, repeat HR in 80s and pt admits to extra anxiety regarding being in the hospital.  States she is finally seeing a pulmonologist this month and has an appmt on 8/14. I feel pt is safe for discharge with close PCP follow up and continued monitoring of her bleeding. Given return precautions and advised following up with OB. Pt has verbalized understanding and agreement to this plan.  Stable  for discharge.    Differential Diagnosis includes, but is not limited to:  Pregnancy complication (threatened AB, inevitable AB, incomplete Ab, missed AB, uterine rupture, ectopic pregnancy, placental abruption, placenta previa), ovarian cyst/torsion, STD, foreign body, trauma, normal menstruation, PE, ACS/MI, anxiety      Problems Addressed:  Chest pain: chronic illness or injury  Pelvic cramping: acute illness or injury  Vaginal bleeding: acute illness or injury    Amount and/or Complexity of Data Reviewed  Labs: ordered. Decision-making details documented in ED Course.  ECG/medicine tests: ordered and independent interpretation performed. Decision-making details documented in ED Course.    Risk  Prescription drug management.               ED Course as of 08/04/25 1613   Mon Aug 04, 2025   1506 CBC auto differential(!)  Stable H/H [RM]   1612 EKG: nsr, 80bpm, normal axis, no significant st/t wave changes from baseline, no stemi  [RM]      ED Course User Index  [RM] Annie Jacob PA-C                               Clinical Impression:  Final diagnoses:  [R07.9] Chest pain  [N93.9] Vaginal bleeding (Primary)  [R10.2] Pelvic cramping          ED Disposition Condition    Discharge Stable          ED Prescriptions    None       Follow-up Information       Follow up With Specialties Details Why Contact Info    Monae Chapin MD Internal Medicine Schedule an appointment as soon as possible for a visit   [facility] [place] [address]  [phone]            Launch MDCalc MDM  MDCalc MDM Module  Aug 04 2025 4:11 PM [Annie Jacob]  Data:  - Test/documents/historian: 3+ tests ordered  Problems: vaginal bleeding  (moderate), Chest pain  Additional encounter diagnoses: Vaginal bleeding, Pelvic cramping  Risk: LIDOcaine patch (Rx drug management)             [1]   Social History  Tobacco Use    Smoking status: Some Days     Types: Cigars    Smokeless tobacco: Never   Substance Use Topics     Alcohol use: Yes     Comment: occ    Drug use: Yes     Frequency: 3.0 times per week     Types: Marijuana        Annie Jacob PA-C  08/04/25 5924

## 2025-08-04 NOTE — FIRST PROVIDER EVALUATION
"Medical screening examination initiated.  I have conducted a focused provider triage encounter, findings are as follows:    Brief history of present illness:  40 y.o female with abdominal cramping, increased bleeding, and malaise after having IUD removed on Friday. Passed large clot this morning but not saturating through pads. + chills and clammy.     Vitals:    08/04/25 1256   BP: (!) 144/67   Pulse: (!) 117   Resp: 19   Temp: 98.4 °F (36.9 °C)   TempSrc: Oral   SpO2: 100%   Weight: 117.9 kg (260 lb)   Height: 5' 11" (1.803 m)       Pertinent physical exam: NAD. Mild tachycardia.    Brief workup plan:  basic labs, covid and flu swab, defer to back    Preliminary workup initiated; this workup will be continued and followed by the physician or advanced practice provider that is assigned to the patient when roomed.  "

## 2025-08-04 NOTE — DISCHARGE INSTRUCTIONS
Continue to follow up with your PCP/pulmonology   Continue to monitor your bleeding, follow up with your OBGYN

## 2025-08-04 NOTE — ED TRIAGE NOTES
Pt reports she had her IUD on Friday. She started having vaginal bleeding on Friday. She is now having bleeding with clots. She is not bleeding through a pad but she is changing it each time she goes to the restroom. She is also having abd cramping

## 2025-08-05 LAB — HOLD SPECIMEN: NORMAL

## 2025-08-07 LAB
OHS QRS DURATION: 98 MS
OHS QTC CALCULATION: 429 MS

## 2025-08-14 ENCOUNTER — HOSPITAL ENCOUNTER (OUTPATIENT)
Dept: RADIOLOGY | Facility: OTHER | Age: 40
Discharge: HOME | End: 2025-08-14
Payer: COMMERCIAL

## 2025-08-14 ENCOUNTER — OFFICE VISIT (OUTPATIENT)
Dept: PULMONOLOGY | Facility: CLINIC | Age: 40
End: 2025-08-14
Payer: COMMERCIAL

## 2025-08-14 DIAGNOSIS — R06.09 DOE (DYSPNEA ON EXERTION): Primary | ICD-10-CM

## 2025-08-14 DIAGNOSIS — J30.9 ALLERGIC RHINITIS, UNSPECIFIED SEASONALITY, UNSPECIFIED TRIGGER: ICD-10-CM

## 2025-08-14 DIAGNOSIS — R06.09 DOE (DYSPNEA ON EXERTION): ICD-10-CM

## 2025-08-14 PROCEDURE — 1160F RVW MEDS BY RX/DR IN RCRD: CPT | Mod: CPTII,95,,

## 2025-08-14 PROCEDURE — 98001 SYNCH AUDIO-VIDEO NEW LOW 30: CPT | Mod: 95,,,

## 2025-08-14 PROCEDURE — 71046 X-RAY EXAM CHEST 2 VIEWS: CPT | Mod: TC

## 2025-08-14 PROCEDURE — 1159F MED LIST DOCD IN RCRD: CPT | Mod: CPTII,95,,

## 2025-08-14 PROCEDURE — 71046 X-RAY EXAM CHEST 2 VIEWS: CPT | Mod: 26,,, | Performed by: RADIOLOGY

## 2025-08-15 ENCOUNTER — RESULTS FOLLOW-UP (OUTPATIENT)
Dept: PULMONOLOGY | Facility: CLINIC | Age: 40
End: 2025-08-15
Payer: COMMERCIAL

## 2025-08-18 PROBLEM — M79.7 FIBROMYALGIA: Status: ACTIVE | Noted: 2025-08-18

## 2025-08-20 ENCOUNTER — TELEPHONE (OUTPATIENT)
Dept: RHEUMATOLOGY | Facility: CLINIC | Age: 40
End: 2025-08-20
Payer: COMMERCIAL

## 2025-08-20 DIAGNOSIS — M79.7 FIBROMYALGIA: Primary | ICD-10-CM

## 2025-08-20 RX ORDER — PREGABALIN 50 MG/1
50 CAPSULE ORAL 2 TIMES DAILY
Qty: 60 CAPSULE | Refills: 0 | Status: SHIPPED | OUTPATIENT
Start: 2025-08-20 | End: 2025-09-19

## 2025-08-25 DIAGNOSIS — M79.7 FIBROMYALGIA: Primary | ICD-10-CM

## 2025-08-28 ENCOUNTER — PATIENT MESSAGE (OUTPATIENT)
Dept: NEUROLOGY | Facility: CLINIC | Age: 40
End: 2025-08-28
Payer: COMMERCIAL